# Patient Record
Sex: MALE | Race: WHITE | Employment: UNEMPLOYED | URBAN - NONMETROPOLITAN AREA
[De-identification: names, ages, dates, MRNs, and addresses within clinical notes are randomized per-mention and may not be internally consistent; named-entity substitution may affect disease eponyms.]

---

## 2017-04-20 ENCOUNTER — HOSPITAL ENCOUNTER (OUTPATIENT)
Dept: VASCULAR LAB | Age: 52
Discharge: HOME | End: 2017-04-20

## 2017-04-20 DIAGNOSIS — M79.604 RIGHT LEG PAIN: ICD-10-CM

## 2017-04-20 DIAGNOSIS — M79.89 LEG SWELLING: ICD-10-CM

## 2017-04-20 PROCEDURE — 93971 EXTREMITY STUDY: CPT

## 2020-02-17 ENCOUNTER — OFFICE VISIT (OUTPATIENT)
Dept: PRIMARY CARE CLINIC | Age: 55
End: 2020-02-17
Payer: MEDICAID

## 2020-02-17 VITALS
RESPIRATION RATE: 24 BRPM | BODY MASS INDEX: 44.1 KG/M2 | HEART RATE: 72 BPM | DIASTOLIC BLOOD PRESSURE: 64 MMHG | TEMPERATURE: 98 F | WEIGHT: 315 LBS | HEIGHT: 71 IN | SYSTOLIC BLOOD PRESSURE: 115 MMHG | OXYGEN SATURATION: 78 %

## 2020-02-17 PROCEDURE — 99203 OFFICE O/P NEW LOW 30 MIN: CPT | Performed by: NURSE PRACTITIONER

## 2020-02-17 PROCEDURE — G0296 VISIT TO DETERM LDCT ELIG: HCPCS | Performed by: NURSE PRACTITIONER

## 2020-02-17 PROCEDURE — 96160 PT-FOCUSED HLTH RISK ASSMT: CPT | Performed by: NURSE PRACTITIONER

## 2020-02-17 PROCEDURE — 3023F SPIROM DOC REV: CPT | Performed by: NURSE PRACTITIONER

## 2020-02-17 PROCEDURE — G8427 DOCREV CUR MEDS BY ELIG CLIN: HCPCS | Performed by: NURSE PRACTITIONER

## 2020-02-17 PROCEDURE — G8484 FLU IMMUNIZE NO ADMIN: HCPCS | Performed by: NURSE PRACTITIONER

## 2020-02-17 PROCEDURE — G8431 POS CLIN DEPRES SCRN F/U DOC: HCPCS | Performed by: NURSE PRACTITIONER

## 2020-02-17 PROCEDURE — 3017F COLORECTAL CA SCREEN DOC REV: CPT | Performed by: NURSE PRACTITIONER

## 2020-02-17 PROCEDURE — G8926 SPIRO NO PERF OR DOC: HCPCS | Performed by: NURSE PRACTITIONER

## 2020-02-17 PROCEDURE — 4004F PT TOBACCO SCREEN RCVD TLK: CPT | Performed by: NURSE PRACTITIONER

## 2020-02-17 PROCEDURE — G8417 CALC BMI ABV UP PARAM F/U: HCPCS | Performed by: NURSE PRACTITIONER

## 2020-02-17 RX ORDER — BUDESONIDE AND FORMOTEROL FUMARATE DIHYDRATE 160; 4.5 UG/1; UG/1
2 AEROSOL RESPIRATORY (INHALATION) 2 TIMES DAILY
COMMUNITY
End: 2020-02-17 | Stop reason: SDUPTHER

## 2020-02-17 RX ORDER — CARVEDILOL 6.25 MG/1
6.25 TABLET ORAL 2 TIMES DAILY WITH MEALS
COMMUNITY
End: 2020-02-17 | Stop reason: SDUPTHER

## 2020-02-17 RX ORDER — NICOTINE 21 MG/24HR
1 PATCH, TRANSDERMAL 24 HOURS TRANSDERMAL EVERY 24 HOURS
Qty: 30 PATCH | Refills: 3 | Status: SHIPPED
Start: 2020-02-17 | End: 2020-03-30 | Stop reason: DRUGHIGH

## 2020-02-17 RX ORDER — BUPROPION HYDROCHLORIDE 150 MG/1
150 TABLET ORAL EVERY MORNING
Qty: 30 TABLET | Refills: 3 | Status: SHIPPED | OUTPATIENT
Start: 2020-02-17 | End: 2020-06-17

## 2020-02-17 RX ORDER — TRIAMTERENE AND HYDROCHLOROTHIAZIDE 37.5; 25 MG/1; MG/1
1 TABLET ORAL DAILY
Qty: 90 TABLET | Refills: 1 | Status: SHIPPED | OUTPATIENT
Start: 2020-02-17 | End: 2020-12-28 | Stop reason: SDUPTHER

## 2020-02-17 RX ORDER — ALBUTEROL SULFATE 2.5 MG/3ML
2.5 SOLUTION RESPIRATORY (INHALATION) EVERY 6 HOURS PRN
COMMUNITY
End: 2020-03-23 | Stop reason: SDUPTHER

## 2020-02-17 RX ORDER — FUROSEMIDE 20 MG/1
20 TABLET ORAL DAILY
Qty: 30 TABLET | Refills: 5 | Status: SHIPPED | OUTPATIENT
Start: 2020-02-17 | End: 2020-03-09 | Stop reason: DRUGHIGH

## 2020-02-17 RX ORDER — OXYMETAZOLINE HYDROCHLORIDE 0.05 G/100ML
2 SPRAY NASAL 2 TIMES DAILY
COMMUNITY

## 2020-02-17 RX ORDER — BUDESONIDE AND FORMOTEROL FUMARATE DIHYDRATE 160; 4.5 UG/1; UG/1
2 AEROSOL RESPIRATORY (INHALATION) 2 TIMES DAILY
Qty: 1 INHALER | Refills: 5 | Status: SHIPPED | OUTPATIENT
Start: 2020-02-17 | End: 2020-08-20

## 2020-02-17 RX ORDER — TRIAMTERENE AND HYDROCHLOROTHIAZIDE 37.5; 25 MG/1; MG/1
1 TABLET ORAL DAILY
COMMUNITY
End: 2020-02-17 | Stop reason: SDUPTHER

## 2020-02-17 RX ORDER — CARVEDILOL 6.25 MG/1
6.25 TABLET ORAL 2 TIMES DAILY WITH MEALS
Qty: 180 TABLET | Refills: 1 | Status: SHIPPED | OUTPATIENT
Start: 2020-02-17 | End: 2020-09-28

## 2020-02-17 SDOH — ECONOMIC STABILITY: FOOD INSECURITY: WITHIN THE PAST 12 MONTHS, THE FOOD YOU BOUGHT JUST DIDN'T LAST AND YOU DIDN'T HAVE MONEY TO GET MORE.: NEVER TRUE

## 2020-02-17 SDOH — ECONOMIC STABILITY: INCOME INSECURITY: HOW HARD IS IT FOR YOU TO PAY FOR THE VERY BASICS LIKE FOOD, HOUSING, MEDICAL CARE, AND HEATING?: NOT HARD AT ALL

## 2020-02-17 SDOH — ECONOMIC STABILITY: FOOD INSECURITY: WITHIN THE PAST 12 MONTHS, YOU WORRIED THAT YOUR FOOD WOULD RUN OUT BEFORE YOU GOT MONEY TO BUY MORE.: NEVER TRUE

## 2020-02-17 SDOH — ECONOMIC STABILITY: TRANSPORTATION INSECURITY
IN THE PAST 12 MONTHS, HAS THE LACK OF TRANSPORTATION KEPT YOU FROM MEDICAL APPOINTMENTS OR FROM GETTING MEDICATIONS?: NO

## 2020-02-17 SDOH — ECONOMIC STABILITY: TRANSPORTATION INSECURITY
IN THE PAST 12 MONTHS, HAS LACK OF TRANSPORTATION KEPT YOU FROM MEETINGS, WORK, OR FROM GETTING THINGS NEEDED FOR DAILY LIVING?: NO

## 2020-02-17 SDOH — HEALTH STABILITY: MENTAL HEALTH: HOW OFTEN DO YOU HAVE A DRINK CONTAINING ALCOHOL?: NEVER

## 2020-02-17 ASSESSMENT — ENCOUNTER SYMPTOMS
ABDOMINAL PAIN: 0
NAUSEA: 0
COUGH: 0
VOMITING: 0
WHEEZING: 0
VISUAL CHANGE: 0
CONSTIPATION: 0
DIARRHEA: 0
RHINORRHEA: 0
SORE THROAT: 0
SPUTUM PRODUCTION: 0
DIFFICULTY BREATHING: 0
SHORTNESS OF BREATH: 0
BACK PAIN: 1

## 2020-02-17 ASSESSMENT — PATIENT HEALTH QUESTIONNAIRE - PHQ9
9. THOUGHTS THAT YOU WOULD BE BETTER OFF DEAD, OR OF HURTING YOURSELF: 3
1. LITTLE INTEREST OR PLEASURE IN DOING THINGS: 2
8. MOVING OR SPEAKING SO SLOWLY THAT OTHER PEOPLE COULD HAVE NOTICED. OR THE OPPOSITE, BEING SO FIGETY OR RESTLESS THAT YOU HAVE BEEN MOVING AROUND A LOT MORE THAN USUAL: 0
SUM OF ALL RESPONSES TO PHQ QUESTIONS 1-9: 18
7. TROUBLE CONCENTRATING ON THINGS, SUCH AS READING THE NEWSPAPER OR WATCHING TELEVISION: 0
10. IF YOU CHECKED OFF ANY PROBLEMS, HOW DIFFICULT HAVE THESE PROBLEMS MADE IT FOR YOU TO DO YOUR WORK, TAKE CARE OF THINGS AT HOME, OR GET ALONG WITH OTHER PEOPLE: 0
4. FEELING TIRED OR HAVING LITTLE ENERGY: 3
2. FEELING DOWN, DEPRESSED OR HOPELESS: 3
SUM OF ALL RESPONSES TO PHQ QUESTIONS 1-9: 18
3. TROUBLE FALLING OR STAYING ASLEEP: 3
5. POOR APPETITE OR OVEREATING: 1
6. FEELING BAD ABOUT YOURSELF - OR THAT YOU ARE A FAILURE OR HAVE LET YOURSELF OR YOUR FAMILY DOWN: 3
SUM OF ALL RESPONSES TO PHQ9 QUESTIONS 1 & 2: 5

## 2020-02-17 ASSESSMENT — COLUMBIA-SUICIDE SEVERITY RATING SCALE - C-SSRS
1. WITHIN THE PAST MONTH, HAVE YOU WISHED YOU WERE DEAD OR WISHED YOU COULD GO TO SLEEP AND NOT WAKE UP?: NO
6. HAVE YOU EVER DONE ANYTHING, STARTED TO DO ANYTHING, OR PREPARED TO DO ANYTHING TO END YOUR LIFE?: NO
2. HAVE YOU ACTUALLY HAD ANY THOUGHTS OF KILLING YOURSELF?: NO

## 2020-02-17 ASSESSMENT — COPD QUESTIONNAIRES: COPD: 1

## 2020-02-17 NOTE — PATIENT INSTRUCTIONS
oxygen therapy    · Take your medicines exactly as prescribed. Call your doctor if you think you are having a problem with your medicine.     · You may be taking medicines such as:  ? Bronchodilators. These help open your airways and make breathing easier. Bronchodilators are either short-acting (work for 6 to 9 hours) or long-acting (work for 24 hours). You inhale most bronchodilators, so they start to act quickly. Always carry your quick-relief inhaler with you in case you need it while you are away from home. ? Corticosteroids (prednisone, budesonide). These reduce airway inflammation. They come in pill or inhaled form. You must take these medicines every day for them to work well.     · A spacer may help you get more inhaled medicine to your lungs. Ask your doctor or pharmacist if a spacer is right for you. If it is, ask how to use it properly.     · Do not take any vitamins, over-the-counter medicine, or herbal products without talking to your doctor first.     · If your doctor prescribed antibiotics, take them as directed. Do not stop taking them just because you feel better. You need to take the full course of antibiotics.     · Oxygen therapy boosts the amount of oxygen in your blood and helps you breathe easier. Use the flow rate your doctor has recommended, and do not change it without talking to your doctor first.   Activity    · Get regular exercise. Walking is an easy way to get exercise. Start out slowly, and walk a little more each day.     · Pay attention to your breathing. You are exercising too hard if you cannot talk while you are exercising.     · Take short rest breaks when doing household chores and other activities.     · Learn breathing methods--such as breathing through pursed lips--to help you become less short of breath.     · If your doctor has not set you up with a pulmonary rehabilitation program, talk to him or her about whether rehab is right for you.  Rehab includes exercise always ask your healthcare professional. [place] [address] any warranty or liability for your use of this information. What is lung cancer screening? Lung cancer screening is a way in which doctors check the lungs for early signs of cancer in people who have no symptoms of lung cancer. A low-dose CT scan uses much less radiation than a normal CT scan and shows a more detailed image of the lungs than a standard X-ray. The goal of lung cancer screening is to find cancer early, before it has a chance to grow, spread, or cause problems. One large study found that smokers who were screened with low-dose CT scans were less likely to die of lung cancer than those who were screened with standard X-ray. Below is a summary of the things you need to know regarding screening for lung cancer with low-dose computed tomography (LDCT). This is a screening program that involves routine annual screening with LDCT studies of the lung. The LDCTs are done using low-dose radiation that is not thought to increase your cancer risk. If you have other serious medical conditions (other cancers, congestive heart failure) that limit your life expectancy to less than 10 years, you should not undergo lung cancer screening with LDCT. The chance is 20%-60% that the LDCT result will show abnormalities. This would require additional testing which could include repeat imaging or even invasive procedures. Most (about 95%) of \"abnormal\" LDCT results are false in the sense that no lung cancer is ultimately found. Additionally, some (about 10%) of the cancers found would not affect your life expectancy, even if undetected and untreated. If you are still smoking, the single most important thing that you can do to reduce your risk of dying of lung cancer is to quit. For this screening to be covered by Medicare and most other insurers, strict criteria must be met.   If you do not meet these criteria, but still wish to undergo LDCT testing, you will be required to sign a waiver indicating your willingness to pay for the scan.

## 2020-02-17 NOTE — PROGRESS NOTES
Name: Debbie Lopez  : 1965         Chief Complaint:     Chief Complaint   Patient presents with    Establish Care     New patient. Former patient of [facility].  COPD    Breast Mass     right breast mass \"diagnosed 2 years ago. \"        History of Present Illness:      Debbie Lopez is a 53 y.o.  male who presents with Establish Care (New patient. Former patient of [facility]. ); COPD; and Breast Mass (right breast mass \"diagnosed 2 years ago. \" )      Anny Schumacher is here today to establish care. Breast mass- \"for awhile\", had previous mammogram, was supposed to have biopsy but never did    Callus- both feet, unable to care for his feet, would like podiatry referral    Lower leg edema- chronic, current diuretic slightly helpful    Right groin pain- chronic, states has had previous surgery in the area    Nicotine dependence- would like help with cessation    Hypertension   This is a chronic problem. The current episode started more than 1 year ago. The problem is unchanged. The problem is controlled. Associated symptoms include malaise/fatigue and peripheral edema. Pertinent negatives include no chest pain, headaches, palpitations or shortness of breath. There are no associated agents to hypertension. Risk factors for coronary artery disease include dyslipidemia, family history, obesity, male gender, sedentary lifestyle, smoking/tobacco exposure and stress. Past treatments include diuretics and beta blockers. The current treatment provides moderate improvement. Compliance problems include exercise and diet. There is no history of kidney disease, CAD/MI, heart failure or left ventricular hypertrophy. There is no history of chronic renal disease. COPD   There is no cough, difficulty breathing, shortness of breath, sputum production or wheezing. This is a chronic problem. The current episode started more than 1 year ago. The problem occurs intermittently. The problem has been unchanged. history. He has never used smokeless tobacco.  Alcohol:      reports current alcohol use. Drug Use:  reports previous drug use. Drug: Marijuana. Family History:     Family History   Problem Relation Age of Onset    Diabetes Father        Review of Systems:     Positive and Negative as described in HPI    Review of Systems   Constitutional: Positive for fatigue and malaise/fatigue. Negative for appetite change, chills, fever and unexpected weight change. HENT: Negative for congestion, rhinorrhea and sore throat. Eyes: Negative for visual disturbance. Respiratory: Negative for cough, sputum production, shortness of breath and wheezing. Cardiovascular: Positive for dyspnea on exertion and leg swelling. Negative for chest pain and palpitations. Gastrointestinal: Negative for abdominal pain, constipation, diarrhea, nausea and vomiting. Genitourinary: Negative for difficulty urinating, dysuria and testicular pain. Musculoskeletal: Positive for arthralgias, back pain and myalgias. Skin: Negative for rash. Neurological: Negative for dizziness, seizures, syncope and headaches. Psychiatric/Behavioral: Positive for dysphoric mood and sleep disturbance. Negative for agitation, behavioral problems, confusion, decreased concentration, hallucinations, self-injury and suicidal ideas. The patient has insomnia. The patient is not nervous/anxious and is not hyperactive. Physical Exam:   Vitals:  /64 (Site: Right Upper Arm, Position: Sitting, Cuff Size: Large Adult)   Pulse 72   Temp 98 °F (36.7 °C)   Resp 24   Ht 5' 11\" (1.803 m)   Wt (!) 331 lb 3.2 oz (150.2 kg)   SpO2 (!) 78%   BMI 46.19 kg/m²     Physical Exam  Vitals signs and nursing note reviewed. Constitutional:       General: He is not in acute distress. Appearance: He is obese. He is not ill-appearing.    HENT:      Mouth/Throat:      Mouth: Mucous membranes are moist.   Eyes:      Conjunctiva/sclera: Conjunctivae normal. Neck:      Musculoskeletal: Normal range of motion and neck supple. Cardiovascular:      Rate and Rhythm: Normal rate and regular rhythm. Heart sounds: No murmur. Pulmonary:      Effort: Pulmonary effort is normal.      Breath sounds: Normal breath sounds. No wheezing. Abdominal:      General: Bowel sounds are normal. There is no distension. Palpations: Abdomen is soft. Tenderness: There is abdominal tenderness. Comments: Morbidly obese abdomen   Musculoskeletal:      Right lower leg: Edema (1-2 + pitting) present. Left lower leg: Edema (1-2 + pitting) present. Skin:     General: Skin is warm and dry. Findings: No rash. Neurological:      Mental Status: He is alert and oriented to person, place, and time. Psychiatric:         Attention and Perception: Attention normal.         Mood and Affect: Mood and affect normal.         Speech: Speech normal.         Behavior: Behavior normal. Behavior is cooperative. Thought Content: Thought content normal. Thought content does not include homicidal or suicidal ideation. Thought content does not include homicidal or suicidal plan. Cognition and Memory: Cognition normal.         Judgment: Judgment normal.         Data:     No results found for: NA, K, CL, CO2, BUN, CREATININE, GLUCOSE, PROT, LABALBU, BILITOT, ALKPHOS, AST, ALT  No results found for: WBC, RBC, HGB, HCT, MCV, MCH, MCHC, RDW, PLT, MPV  No results found for: TSH  No results found for: CHOL, HDL, PSA, LABA1C    Assessment/Plan:      Diagnosis Orders   1. Essential hypertension  triamterene-hydrochlorothiazide (MAXZIDE-25) 37.5-25 MG per tablet    carvedilol (COREG) 6.25 MG tablet    Basic Metabolic Panel   2. Chronic obstructive pulmonary disease, unspecified COPD type [facility])  Jordan Chino MD, Pulmonology, [place]    budesonide-formoterol (SYMBICORT) 160-4.5 MCG/ACT AERO   3. Dysthymia  buPROPion (WELLBUTRIN XL) 150 MG extended release tablet   4. (around 5/17/2020) for check up. On the basis of positive PHQ-9 screening (PHQ-9 Total Score: 18), the following plan was implemented: medication prescribed: Wellbutrin- 150 mg- patient will call for any significant medication side effects or worsening symptoms of depression. Patient will follow-up in 1 month(s) with PCP.

## 2020-02-18 ENCOUNTER — TELEPHONE (OUTPATIENT)
Dept: PRIMARY CARE CLINIC | Age: 55
End: 2020-02-18

## 2020-02-19 ENCOUNTER — TELEPHONE (OUTPATIENT)
Dept: ONCOLOGY | Age: 55
End: 2020-02-19

## 2020-02-19 NOTE — LETTER
2/19/2020        [address] [place] [id number] [address] [address]    Dear Amee Parker: Your healthcare provider has ordered a low dose CT scan of the chest for lung cancer screening. You will find enclosed, information about CT lung screening. Please review the statement of understanding, you will be asked to sign a copy of this at the time of your CT scan    If you have not already been contacted to make the appointment for your scan, please call our scheduling department at [phone]    Keep in mind that CT lung screening does not take the place of smoking cessation. If you are a current smoker, you will find enclosed smoking cessation resources. Please do not hesitate to contact me if you have any questions or concerns.     [address] [facility],      [facility] Lung Screening Program  [phone]

## 2020-02-26 ENCOUNTER — TELEPHONE (OUTPATIENT)
Dept: PRIMARY CARE CLINIC | Age: 55
End: 2020-02-26

## 2020-02-26 ENCOUNTER — HOSPITAL ENCOUNTER (OUTPATIENT)
Dept: CT IMAGING | Age: 55
Discharge: HOME | End: 2020-02-28
Payer: MEDICAID

## 2020-02-26 ENCOUNTER — HOSPITAL ENCOUNTER (OUTPATIENT)
Dept: ULTRASOUND IMAGING | Age: 55
Discharge: HOME | End: 2020-02-28
Payer: MEDICAID

## 2020-02-26 ENCOUNTER — HOSPITAL ENCOUNTER (OUTPATIENT)
Age: 55
Discharge: HOME | End: 2020-02-26
Payer: MEDICAID

## 2020-02-26 LAB
ANION GAP SERPL CALCULATED.3IONS-SCNC: 8 MMOL/L (ref 9–17)
BUN BLDV-MCNC: 26 MG/DL (ref 6–20)
BUN/CREAT BLD: 22 (ref 9–20)
CALCIUM SERPL-MCNC: 9 MG/DL (ref 8.6–10.4)
CHLORIDE BLD-SCNC: 97 MMOL/L (ref 98–107)
CO2: 35 MMOL/L (ref 20–31)
CREAT SERPL-MCNC: 1.19 MG/DL (ref 0.7–1.2)
GFR AFRICAN AMERICAN: >60 ML/MIN
GFR NON-AFRICAN AMERICAN: >60 ML/MIN
GFR SERPL CREATININE-BSD FRML MDRD: ABNORMAL ML/MIN/{1.73_M2}
GFR SERPL CREATININE-BSD FRML MDRD: ABNORMAL ML/MIN/{1.73_M2}
GLUCOSE BLD-MCNC: 195 MG/DL (ref 70–99)
POTASSIUM SERPL-SCNC: 4.6 MMOL/L (ref 3.7–5.3)
SODIUM BLD-SCNC: 140 MMOL/L (ref 135–144)

## 2020-02-26 PROCEDURE — 76870 US EXAM SCROTUM: CPT

## 2020-02-26 PROCEDURE — G0297 LDCT FOR LUNG CA SCREEN: HCPCS

## 2020-02-26 PROCEDURE — 80048 BASIC METABOLIC PNL TOTAL CA: CPT

## 2020-02-26 PROCEDURE — 36415 COLL VENOUS BLD VENIPUNCTURE: CPT

## 2020-02-26 NOTE — TELEPHONE ENCOUNTER
Called patient and left message that US of scrotom: Labs stable.  MRI of the abdomen ordered due to abnormal appearance of fluid around the liver. was normal. CT of the lungs show nodule that needs further imaging.  PET scan ordered. Emanuel Bales keep appointment with pulmonologist on March 2. Message left that [facility] will be calling him to schedule MRI and PET scan. To call office with any questions.

## 2020-02-27 ENCOUNTER — HOSPITAL ENCOUNTER (OUTPATIENT)
Dept: WOMENS IMAGING | Age: 55
Discharge: HOME | End: 2020-02-29
Payer: MEDICAID

## 2020-02-27 ENCOUNTER — HOSPITAL ENCOUNTER (OUTPATIENT)
Dept: ULTRASOUND IMAGING | Age: 55
Discharge: HOME | End: 2020-02-29
Payer: MEDICAID

## 2020-02-27 ENCOUNTER — TELEPHONE (OUTPATIENT)
Dept: PRIMARY CARE CLINIC | Age: 55
End: 2020-02-27

## 2020-02-27 PROCEDURE — G0279 TOMOSYNTHESIS, MAMMO: HCPCS

## 2020-02-27 PROCEDURE — 76641 ULTRASOUND BREAST COMPLETE: CPT

## 2020-02-27 NOTE — LETTER
[place] [address] [address] [facility]  Phone: [phone]  Fax: [phone], APRN - CNP        February 27, 2020     Patient: Veronica Curiel   YOB: 1965   Date of Visit: 2/27/2020       To Whom It May Concern: It is my medical opinion that Ashu Man requires a disability parking placard for the following reasons:  He cannot walk 200 feet without stopping to rest.  Duration of need: 1 year    If you have any questions or concerns, please don't hesitate to call.     Sincerely,        Lacey Segundo, APRN - CNP

## 2020-02-28 ENCOUNTER — TELEPHONE (OUTPATIENT)
Dept: PRIMARY CARE CLINIC | Age: 55
End: 2020-02-28

## 2020-02-28 NOTE — TELEPHONE ENCOUNTER
Attempted to call patient and message left regarding normal mammogram. Instructed to call this office with any questions.

## 2020-02-28 NOTE — TELEPHONE ENCOUNTER
----- Message from [address] [place] [address], ADRIAN - CNP sent at 2/27/2020  8:11 PM EST -----  Results are normal, please call patient and make them aware.

## 2020-03-03 ENCOUNTER — TELEPHONE (OUTPATIENT)
Dept: PRIMARY CARE CLINIC | Age: 55
End: 2020-03-03

## 2020-03-06 ENCOUNTER — TELEPHONE (OUTPATIENT)
Dept: PRIMARY CARE CLINIC | Age: 55
End: 2020-03-06

## 2020-03-06 ENCOUNTER — HOSPITAL ENCOUNTER (EMERGENCY)
Age: 55
Discharge: HOME | End: 2020-03-06
Payer: MEDICAID

## 2020-03-06 ENCOUNTER — APPOINTMENT (OUTPATIENT)
Dept: VASCULAR LAB | Age: 55
End: 2020-03-06
Payer: MEDICAID

## 2020-03-06 VITALS
BODY MASS INDEX: 44.1 KG/M2 | RESPIRATION RATE: 22 BRPM | DIASTOLIC BLOOD PRESSURE: 71 MMHG | TEMPERATURE: 98.2 F | HEIGHT: 71 IN | OXYGEN SATURATION: 93 % | HEART RATE: 76 BPM | SYSTOLIC BLOOD PRESSURE: 135 MMHG | WEIGHT: 315 LBS

## 2020-03-06 LAB
ANION GAP SERPL CALCULATED.3IONS-SCNC: 5 MMOL/L (ref 9–17)
BUN BLDV-MCNC: 15 MG/DL (ref 6–20)
BUN/CREAT BLD: 17 (ref 9–20)
CALCIUM SERPL-MCNC: 9 MG/DL (ref 8.6–10.4)
CHLORIDE BLD-SCNC: 93 MMOL/L (ref 98–107)
CO2: 40 MMOL/L (ref 20–31)
CREAT SERPL-MCNC: 0.86 MG/DL (ref 0.7–1.2)
GFR AFRICAN AMERICAN: >60 ML/MIN
GFR NON-AFRICAN AMERICAN: >60 ML/MIN
GFR SERPL CREATININE-BSD FRML MDRD: ABNORMAL ML/MIN/{1.73_M2}
GFR SERPL CREATININE-BSD FRML MDRD: ABNORMAL ML/MIN/{1.73_M2}
GLUCOSE BLD-MCNC: 131 MG/DL (ref 70–99)
HCT VFR BLD CALC: 56.9 % (ref 40.7–50.3)
HEMOGLOBIN: 16.6 G/DL (ref 13–17)
MCH RBC QN AUTO: 29.5 PG (ref 25.2–33.5)
MCHC RBC AUTO-ENTMCNC: 29.2 G/DL (ref 28.4–34.8)
MCV RBC AUTO: 101.2 FL (ref 82.6–102.9)
NRBC AUTOMATED: 0 PER 100 WBC
PDW BLD-RTO: 18.7 % (ref 11.8–14.4)
PLATELET # BLD: 142 K/UL (ref 138–453)
PMV BLD AUTO: 11.1 FL (ref 8.1–13.5)
POTASSIUM SERPL-SCNC: 5 MMOL/L (ref 3.7–5.3)
RBC # BLD: 5.62 M/UL (ref 4.21–5.77)
SODIUM BLD-SCNC: 138 MMOL/L (ref 135–144)
WBC # BLD: 7.8 K/UL (ref 3.5–11.3)

## 2020-03-06 PROCEDURE — 99283 EMERGENCY DEPT VISIT LOW MDM: CPT

## 2020-03-06 PROCEDURE — 85027 COMPLETE CBC AUTOMATED: CPT

## 2020-03-06 PROCEDURE — 93971 EXTREMITY STUDY: CPT

## 2020-03-06 PROCEDURE — 36415 COLL VENOUS BLD VENIPUNCTURE: CPT

## 2020-03-06 PROCEDURE — 80048 BASIC METABOLIC PNL TOTAL CA: CPT

## 2020-03-06 RX ORDER — CEPHALEXIN 500 MG/1
500 CAPSULE ORAL 4 TIMES DAILY
Qty: 40 CAPSULE | Refills: 0 | Status: SHIPPED | OUTPATIENT
Start: 2020-03-06 | End: 2020-03-16

## 2020-03-06 RX ORDER — SULFAMETHOXAZOLE AND TRIMETHOPRIM 800; 160 MG/1; MG/1
1 TABLET ORAL 2 TIMES DAILY
Qty: 20 TABLET | Refills: 0 | Status: SHIPPED | OUTPATIENT
Start: 2020-03-06 | End: 2020-03-16

## 2020-03-06 ASSESSMENT — ENCOUNTER SYMPTOMS
NAUSEA: 0
SHORTNESS OF BREATH: 0
VOMITING: 0
BLOOD IN STOOL: 0
RHINORRHEA: 0
ABDOMINAL PAIN: 0
BACK PAIN: 0
WHEEZING: 0
COUGH: 0
EYE DISCHARGE: 0
CHEST TIGHTNESS: 0
CONSTIPATION: 0
DIARRHEA: 0
SORE THROAT: 0
EYE REDNESS: 0

## 2020-03-06 ASSESSMENT — PAIN - FUNCTIONAL ASSESSMENT: PAIN_FUNCTIONAL_ASSESSMENT: 0-10

## 2020-03-06 ASSESSMENT — PAIN SCALES - GENERAL: PAINLEVEL_OUTOF10: 4

## 2020-03-06 NOTE — TELEPHONE ENCOUNTER
Pt called office stating that his legs are more swollen and even have red and purple areas on his legs. Pt states his left leg is the worst and he has been taking the medications that were prescribed. Writer advised pt to go to the ER with the symptoms he states he has. Pt verbalized understanding.

## 2020-03-06 NOTE — ED PROVIDER NOTES
myalgias. Skin: Positive for wound. Negative for pallor and rash. Allergic/Immunologic: Negative for food allergies and immunocompromised state. Neurological: Negative for dizziness, syncope, weakness and light-headedness. Hematological: Negative for adenopathy. Does not bruise/bleed easily. Psychiatric/Behavioral: Negative for behavioral problems and suicidal ideas. The patient is not nervous/anxious. Except as noted above the remainder of the review of systems was reviewed and negative.        PAST MEDICAL HISTORY     Past Medical History:   Diagnosis Date    COPD [facility]chronic obstructive pulmonary disease) ([facility] [address]     Hypertension          SURGICALHISTORY       Past Surgical History:   Procedure Laterality Date    ANKLE SURGERY      CARPAL TUNNEL RELEASE Bilateral     HERNIA REPAIR      KNEE ARTHROSCOPY      NECK SURGERY           CURRENT MEDICATIONS       Previous Medications    ALBUTEROL (PROVENTIL) (2.5 MG/3ML) 0.083% NEBULIZER SOLUTION    Take 2.5 mg by nebulization every 6 hours as needed for Wheezing    BUDESONIDE-FORMOTEROL (SYMBICORT) 160-4.5 MCG/ACT AERO    Inhale 2 puffs into the lungs 2 times daily    BUPROPION (WELLBUTRIN XL) 150 MG EXTENDED RELEASE TABLET    Take 1 tablet by mouth every morning    CARVEDILOL (COREG) 6.25 MG TABLET    Take 1 tablet by mouth 2 times daily (with meals)    COLCHICINE (COLCRYS PO)    Take 1 tablet by mouth 2 times daily     FUROSEMIDE (LASIX) 20 MG TABLET    Take 1 tablet by mouth daily    NICOTINE (NICODERM CQ) 21 MG/24HR    Place 1 patch onto the skin every 24 hours    OXYMETAZOLINE (AFRIN) 0.05 % NASAL SPRAY    2 sprays by Nasal route 2 times daily    TRIAMTERENE-HYDROCHLOROTHIAZIDE (MAXZIDE-25) 37.5-25 MG PER TABLET    Take 1 tablet by mouth daily       ALLERGIES     Pcn [penicillins]    FAMILY HISTORY       Family History   Problem Relation Age of Onset    Diabetes Father           SOCIAL HISTORY       Social History     Socioeconomic History    normal.      Mouth/Throat:      Mouth: Mucous membranes are moist.      Pharynx: No oropharyngeal exudate. Eyes:      General: No scleral icterus. Right eye: No discharge. Left eye: No discharge. Conjunctiva/sclera: Conjunctivae normal.      Pupils: Pupils are equal, round, and reactive to light. Neck:      Musculoskeletal: Normal range of motion and neck supple. Thyroid: No thyromegaly. Trachea: No tracheal deviation. Cardiovascular:      Rate and Rhythm: Normal rate and regular rhythm. Pulmonary:      Effort: Pulmonary effort is normal. No respiratory distress. Breath sounds: Normal breath sounds. No stridor. No wheezing. Comments: On nasal cannula  Abdominal:      General: Bowel sounds are normal. There is no distension. Palpations: Abdomen is soft. Tenderness: There is no abdominal tenderness. There is no guarding or rebound. Musculoskeletal: Normal range of motion. General: No tenderness, deformity or signs of injury. Lymphadenopathy:      Cervical: No cervical adenopathy. Skin:     General: Skin is warm and dry. Capillary Refill: Capillary refill takes less than 2 seconds. Findings: Erythema present. No rash. Comments: There is swelling left greater than right of the lower leg. Erythematous with warmth noted. Small open scratch. Intact distal pulses sensation cap refills less than 3 seconds. There is some slight calf tenderness. Otherwise full range of motion of knee foot and ankle. No cyanosis no skin necrosis. Neurological:      General: No focal deficit present. Mental Status: He is alert and oriented to person, place, and time. Cranial Nerves: No cranial nerve deficit. Motor: No abnormal muscle tone. Deep Tendon Reflexes: Reflexes are normal and symmetric.  Reflexes normal.   Psychiatric:         Behavior: Behavior normal.         DIAGNOSTIC RESULTS     EKG: All EKG's are interpreted by the

## 2020-03-09 ENCOUNTER — TELEPHONE (OUTPATIENT)
Dept: PRIMARY CARE CLINIC | Age: 55
End: 2020-03-09

## 2020-03-09 ENCOUNTER — OFFICE VISIT (OUTPATIENT)
Dept: PRIMARY CARE CLINIC | Age: 55
End: 2020-03-09
Payer: MEDICAID

## 2020-03-09 ENCOUNTER — HOSPITAL ENCOUNTER (OUTPATIENT)
Dept: MRI IMAGING | Age: 55
Discharge: HOME | End: 2020-03-11
Payer: MEDICAID

## 2020-03-09 VITALS
HEIGHT: 71 IN | DIASTOLIC BLOOD PRESSURE: 73 MMHG | HEART RATE: 76 BPM | SYSTOLIC BLOOD PRESSURE: 128 MMHG | TEMPERATURE: 97.8 F | RESPIRATION RATE: 18 BRPM | BODY MASS INDEX: 43.12 KG/M2 | WEIGHT: 308 LBS

## 2020-03-09 PROCEDURE — G8417 CALC BMI ABV UP PARAM F/U: HCPCS | Performed by: NURSE PRACTITIONER

## 2020-03-09 PROCEDURE — G8427 DOCREV CUR MEDS BY ELIG CLIN: HCPCS | Performed by: NURSE PRACTITIONER

## 2020-03-09 PROCEDURE — 74181 MRI ABDOMEN W/O CONTRAST: CPT

## 2020-03-09 PROCEDURE — G8484 FLU IMMUNIZE NO ADMIN: HCPCS | Performed by: NURSE PRACTITIONER

## 2020-03-09 PROCEDURE — 3017F COLORECTAL CA SCREEN DOC REV: CPT | Performed by: NURSE PRACTITIONER

## 2020-03-09 PROCEDURE — 99214 OFFICE O/P EST MOD 30 MIN: CPT | Performed by: NURSE PRACTITIONER

## 2020-03-09 PROCEDURE — 4004F PT TOBACCO SCREEN RCVD TLK: CPT | Performed by: NURSE PRACTITIONER

## 2020-03-09 RX ORDER — AMMONIUM LACTATE 12 G/100G
CREAM TOPICAL
COMMUNITY
Start: 2020-02-17 | End: 2022-01-27 | Stop reason: ALTCHOICE

## 2020-03-09 RX ORDER — FUROSEMIDE 20 MG/1
40 TABLET ORAL DAILY
Qty: 30 TABLET | Refills: 5 | Status: SHIPPED
Start: 2020-02-17 | End: 2020-03-30 | Stop reason: SDUPTHER

## 2020-03-09 ASSESSMENT — ENCOUNTER SYMPTOMS
SHORTNESS OF BREATH: 0
SORE THROAT: 0
VOMITING: 0
DIARRHEA: 0
RHINORRHEA: 0
NAUSEA: 0
COUGH: 0
ABDOMINAL PAIN: 0
WHEEZING: 0
COLOR CHANGE: 1
CONSTIPATION: 0

## 2020-03-09 NOTE — PATIENT INSTRUCTIONS
wound with a thin layer of petroleum jelly, such as Vaseline, and a nonstick bandage. ? Apply more petroleum jelly and replace the bandage as needed. · Be safe with medicines. Take pain medicines exactly as directed. ? If the doctor gave you a prescription medicine for pain, take it as prescribed. ? If you are not taking a prescription pain medicine, ask your doctor if you can take an over-the-counter medicine. To prevent cellulitis in the future  · Try to prevent cuts, scrapes, or other injuries to your skin. Cellulitis most often occurs where there is a break in the skin. · If you get a scrape, cut, mild burn, or bite, wash the wound with clean water as soon as you can to help avoid infection. Don't use hydrogen peroxide or alcohol, which can slow healing. · If you have swelling in your legs (edema), support stockings and good skin care may help prevent leg sores and cellulitis. · Take care of your feet, especially if you have diabetes or other conditions that increase the risk of infection. Wear shoes and socks. Do not go barefoot. If you have athlete's foot or other skin problems on your feet, talk to your doctor about how to treat them. When should you call for help? Call your doctor now or seek immediate medical care if:    · You have signs that your infection is getting worse, such as:  ? Increased pain, swelling, warmth, or redness. ? Red streaks leading from the area. ? Pus draining from the area. ? A fever.     · You get a rash.    Watch closely for changes in your health, and be sure to contact your doctor if:    · You do not get better as expected. Where can you learn more? Go to https://[email or web address].[email or web address].net. org and sign in to your [email or web address] account. Enter U148 in the MedicAnimal.com box to learn more about \"Cellulitis: Care Instructions. \"     If you do not have an account, please click on the \"Sign Up Now\" link.   Current as of: April 1, 2019  Content Version: 12.3  ©

## 2020-03-09 NOTE — PROGRESS NOTES
Name: Ebony Rivas  : 1965         Chief Complaint:     Chief Complaint   Patient presents with    ED Follow-up     States \"I was in the ER on Friday for cellulitis. \"  Left lower leg.  Cellulitis       History of Present Illness:      Ebony Rivas is a 53 y.o.  male who presents with ED Follow-up (States \"I was in the ER on Friday for cellulitis. \"  Left lower leg. ) and Cellulitis      Oletha Hetal is here today for an ER follow up visit. Lower leg cellulitis- in ER for redness and swelling, ruled out DVT, treated with ATB, improving    Lower leg edema- continues despite 20 mg furosemide daily        Wound Check   He was originally treated 5 to 10 days ago. Previous treatment included IV/IM antibiotics and oral antibiotics. His temperature was unmeasured prior to arrival. There has been no drainage from the wound. The redness has improved. The swelling has improved. The pain has improved. He has no difficulty moving the affected extremity or digit. Past Medical History:     Past Medical History:   Diagnosis Date    COPD (chronic obstructive pulmonary disease) ([facility] [address]     Hypertension       Reviewed all health maintenance requirements and ordered appropriate tests  Health Maintenance Due   Topic Date Due    Lipid screen  2004    Diabetes screen  2004    Colon cancer screen colonoscopy  2014       Past Surgical History:     Past Surgical History:   Procedure Laterality Date    ANKLE SURGERY      CARPAL TUNNEL RELEASE Bilateral     HERNIA REPAIR      KNEE ARTHROSCOPY      NECK SURGERY          Medications:       Prior to Admission medications    Medication Sig Start Date End Date Taking?  Authorizing Provider   ammonium lactate (AMLACTIN) 12 % cream  20  Yes Historical Provider, MD   furosemide (LASIX) 20 MG tablet Take 2 tablets by mouth daily 20  Yes Aby Lopez Might, APRN - CNP   sulfamethoxazole-trimethoprim (BACTRIM DS) 800-160 MG per tablet Take 1 tablet by mouth 2 times daily for 10 days 3/6/20 3/16/20 Yes Andrea Dillard PA-C   cephALEXin (KEFLEX) 500 MG capsule Take 1 capsule by mouth 4 times daily for 10 days 3/6/20 3/16/20 Yes Andrea Dillard PA-C   albuterol (PROVENTIL) (2.5 MG/3ML) 0.083% nebulizer solution Take 2.5 mg by nebulization every 6 hours as needed for Wheezing   Yes Historical Provider, MD   Colchicine (COLCRYS PO) Take 1 tablet by mouth 2 times daily    Yes Historical Provider, MD   oxymetazoline (AFRIN) 0.05 % nasal spray 2 sprays by Nasal route 2 times daily   Yes Historical Provider, MD   budesonide-formoterol (SYMBICORT) 160-4.5 MCG/ACT AERO Inhale 2 puffs into the lungs 2 times daily 2/17/20  Yes ADRIAN Pacheco CNP   triamterene-hydrochlorothiazide (MAXZIDE-25) 37.5-25 MG per tablet Take 1 tablet by mouth daily 2/17/20  Yes ADRIAN Pacheco CNP   carvedilol (COREG) 6.25 MG tablet Take 1 tablet by mouth 2 times daily (with meals) 2/17/20  Yes ADRIAN Pacheco CNP   nicotine (NICODERM CQ) 21 MG/24HR Place 1 patch onto the skin every 24 hours 2/17/20  Yes ADRIAN Pacheco CNP   buPROPion (WELLBUTRIN XL) 150 MG extended release tablet Take 1 tablet by mouth every morning 2/17/20  Yes ADRIAN Pacheco CNP        Allergies:       Pcn [penicillins]    Social History:     Tobacco:    reports that he has been smoking. He has a 4.20 pack-year smoking history. He has never used smokeless tobacco.  Alcohol:      reports current alcohol use. Drug Use:  reports previous drug use. Drug: Marijuana. Family History:     Family History   Problem Relation Age of Onset    Diabetes Father        Review of Systems:     Positive and Negative as described in HPI    Review of Systems   Constitutional: Negative for chills, fatigue and fever. HENT: Negative for congestion, rhinorrhea and sore throat. Eyes: Negative for visual disturbance. Respiratory: Negative for cough, shortness of breath and wheezing.     Cardiovascular: CREATININE 0.86 03/06/2020    GLUCOSE 131 03/06/2020     Lab Results   Component Value Date    WBC 7.8 03/06/2020    RBC 5.62 03/06/2020    HGB 16.6 03/06/2020    HCT 56.9 03/06/2020    .2 03/06/2020    MCH 29.5 03/06/2020    MCHC 29.2 03/06/2020    RDW 18.7 03/06/2020     03/06/2020    MPV 11.1 03/06/2020     No results found for: TSH  No results found for: CHOL, HDL, PSA, LABA1C    Assessment/Plan:      Diagnosis Orders   1. Cellulitis of left lower leg  Basic Metabolic Panel   2. Lower leg edema  furosemide (LASIX) 20 MG tablet    Basic Metabolic Panel       Continue ATB  Increase furosemide to 40 mg daily. Repeat BMP in one week. 1.  Sara Godinez received counseling on the following healthy behaviors: nutrition, exercise and medication adherence  2. Patient given educational materials - see patient instructions  3. Was a self-tracking handout given in paper form or via Transfer[employer]iset? No  If yes, see orders or list here. 4.  Discussed use, benefit, and side effects of prescribed medications. Barriers to medication compliance addressed. All patient questions answered. Pt voiced understanding. 5.  Reviewed prior labs and health maintenance  6. Continue current medications, diet and exercise. Completed Refills   Requested Prescriptions     Signed Prescriptions Disp Refills    furosemide (LASIX) 20 MG tablet 30 tablet 5     Sig: Take 2 tablets by mouth daily         Return if symptoms worsen or fail to improve.

## 2020-03-09 NOTE — TELEPHONE ENCOUNTER
[facility] Transitions Initial Follow Up Call    Outreach made within 2 business days of discharge: Yes    Patient: Thor Horton Patient : 1965   MRN: [id number]  Reason for Admission: There are no discharge diagnoses documented for the most recent discharge. Discharge Date: 3/6/20       Spoke with: Saeid Pritchett    Discharge department/facility: [facility] ER    TCM Interactive Patient Contact:  Was patient able to fill all prescriptions: yes  Was patient instructed to bring all medications to the follow-up visit: Yes  Is patient taking all medications as directed in the discharge summary?  Yes  Does patient understand their discharge instructions: Yes  Does patient have questions or concerns that need addressed prior to 7-14 day follow up office visit: no, had appt this am    Scheduled appointment with PCP within 7-14 days    Follow Up  Future Appointments   Date Time Provider Vinayak Cornell   3/9/2020 12:30 PM [facility] MRI [facility] Rad   2020 10:40 AM Popeye Segundo, APRN - CNP [facility]       Riana Delong

## 2020-03-10 ENCOUNTER — TELEPHONE (OUTPATIENT)
Dept: PRIMARY CARE CLINIC | Age: 55
End: 2020-03-10

## 2020-03-10 NOTE — TELEPHONE ENCOUNTER
Called patient and left message that Nikia Wing reviewed his MRI results and that it shows cirrhosis of liver and that Nikia Wing is referring him to Dr Dnai Stephenson the GI specialist and that he wants additional blood work done to check for hepatitis. Message left that Dr Tillman Rolling office will call him to schedule appt. To call office with any questions.

## 2020-03-10 NOTE — TELEPHONE ENCOUNTER
----- Message from ADRIAN Garvin CNP sent at 3/10/2020 12:30 PM EDT -----  MRI does show liver cirrhosis. Additional labs for hepatitis ordered and consult with GI ordered.   Thank you

## 2020-03-11 ENCOUNTER — TELEPHONE (OUTPATIENT)
Dept: CASE MANAGEMENT | Age: 55
End: 2020-03-11

## 2020-03-11 NOTE — TELEPHONE ENCOUNTER
Lung Navigator reviewing chart and recent Lung Screening and pt. Needing PET, order placed by provider, plan to follow. Pt. cancelled appt. On 3/2 with Dr. Elver Aranda.

## 2020-03-12 ENCOUNTER — TELEPHONE (OUTPATIENT)
Dept: PRIMARY CARE CLINIC | Age: 55
End: 2020-03-12

## 2020-03-12 NOTE — TELEPHONE ENCOUNTER
Attempted to call patient regarding PET CT. Message left that PET Scan was scheduled for 3/18/2020 at 7:30 AM. Informed that someone from the PET scan office will call him to go over some info. Instructed to call this office with any questions.

## 2020-03-13 ENCOUNTER — TELEPHONE (OUTPATIENT)
Dept: PRIMARY CARE CLINIC | Age: 55
End: 2020-03-13

## 2020-03-13 LAB
CONTROL: PRESENT
HEMOCCULT STL QL: NEGATIVE

## 2020-03-13 PROCEDURE — 82274 ASSAY TEST FOR BLOOD FECAL: CPT | Performed by: NURSE PRACTITIONER

## 2020-03-13 NOTE — TELEPHONE ENCOUNTER
Attempted to call patient and message left regarding normal FIT test. Instructed to call this office with any questions.

## 2020-03-18 ENCOUNTER — HOSPITAL ENCOUNTER (OUTPATIENT)
Dept: PET IMAGING | Age: 55
Discharge: HOME | End: 2020-03-20
Payer: MEDICAID

## 2020-03-18 ENCOUNTER — TELEPHONE (OUTPATIENT)
Dept: PRIMARY CARE CLINIC | Age: 55
End: 2020-03-18

## 2020-03-18 PROCEDURE — A9552 F18 FDG: HCPCS | Performed by: NURSE PRACTITIONER

## 2020-03-18 PROCEDURE — 3430000000 HC RX DIAGNOSTIC RADIOPHARMACEUTICAL: Performed by: NURSE PRACTITIONER

## 2020-03-18 PROCEDURE — 78815 PET IMAGE W/CT SKULL-THIGH: CPT

## 2020-03-18 RX ORDER — FLUDEOXYGLUCOSE F 18 200 MCI/ML
11.9 INJECTION, SOLUTION INTRAVENOUS
Status: COMPLETED | OUTPATIENT
Start: 2020-03-18 | End: 2020-03-18

## 2020-03-18 RX ADMIN — FLUDEOXYGLUCOSE F 18 11.9 MILLICURIE: 200 INJECTION, SOLUTION INTRAVENOUS at 07:50

## 2020-03-18 NOTE — TELEPHONE ENCOUNTER
----- Message from [address] [place] [address], ADRIAN - CNP sent at 3/18/2020 11:45 AM EDT -----  No abnormal uptake on PET scan, he will however need follow-up with pulmonology. I see that he canceled his appointment, we will need to reschedule place.

## 2020-03-19 ENCOUNTER — TELEPHONE (OUTPATIENT)
Dept: CASE MANAGEMENT | Age: 55
End: 2020-03-19

## 2020-03-23 ENCOUNTER — HOSPITAL ENCOUNTER (OUTPATIENT)
Age: 55
Discharge: HOME | End: 2020-03-23
Payer: MEDICAID

## 2020-03-23 LAB
ANION GAP SERPL CALCULATED.3IONS-SCNC: 11 MMOL/L (ref 9–17)
BUN BLDV-MCNC: 25 MG/DL (ref 6–20)
BUN/CREAT BLD: 27 (ref 9–20)
CALCIUM SERPL-MCNC: 10.4 MG/DL (ref 8.6–10.4)
CHLORIDE BLD-SCNC: 94 MMOL/L (ref 98–107)
CO2: 29 MMOL/L (ref 20–31)
CREAT SERPL-MCNC: 0.93 MG/DL (ref 0.7–1.2)
GFR AFRICAN AMERICAN: >60 ML/MIN
GFR NON-AFRICAN AMERICAN: >60 ML/MIN
GFR SERPL CREATININE-BSD FRML MDRD: ABNORMAL ML/MIN/{1.73_M2}
GFR SERPL CREATININE-BSD FRML MDRD: ABNORMAL ML/MIN/{1.73_M2}
GLUCOSE BLD-MCNC: 90 MG/DL (ref 70–99)
HAV IGM SER IA-ACNC: NONREACTIVE
HEPATITIS B CORE IGM ANTIBODY: NONREACTIVE
HEPATITIS B SURFACE ANTIGEN: NONREACTIVE
HEPATITIS C ANTIBODY: NONREACTIVE
POTASSIUM SERPL-SCNC: 4.9 MMOL/L (ref 3.7–5.3)
SODIUM BLD-SCNC: 134 MMOL/L (ref 135–144)

## 2020-03-23 PROCEDURE — 80048 BASIC METABOLIC PNL TOTAL CA: CPT

## 2020-03-23 PROCEDURE — 36415 COLL VENOUS BLD VENIPUNCTURE: CPT

## 2020-03-23 PROCEDURE — 80074 ACUTE HEPATITIS PANEL: CPT

## 2020-03-23 RX ORDER — ALBUTEROL SULFATE 2.5 MG/3ML
2.5 SOLUTION RESPIRATORY (INHALATION) EVERY 6 HOURS PRN
Qty: 120 EACH | Refills: 1 | Status: SHIPPED | OUTPATIENT
Start: 2020-03-23 | End: 2020-05-26

## 2020-03-23 RX ORDER — ZOLPIDEM TARTRATE 10 MG/1
10 TABLET ORAL NIGHTLY PRN
Qty: 14 TABLET | Refills: 0 | Status: SHIPPED | OUTPATIENT
Start: 2020-03-23 | End: 2020-04-06

## 2020-03-23 NOTE — TELEPHONE ENCOUNTER
Phone call from patient requesting refill of albuterol nebulizer medication. Also patient states that his previous doctor ordered Ambien 10mg for him for sleep. States was given 10 tablets in October and just ran out. Would like refill. Health Maintenance   Topic Date Due    Lipid screen  04/24/2004    Diabetes screen  04/24/2004    DTaP/Tdap/Td vaccine (1 - Tdap) 02/17/2021 (Originally 4/24/1983)    Flu vaccine (1) 02/17/2021 (Originally 9/1/2019)    Shingles Vaccine (1 of 2) 02/17/2021 (Originally 4/24/2014)    Hepatitis C screen  02/17/2021 (Originally 1965)    HIV screen  02/17/2021 (Originally 4/24/1979)    Hepatitis A vaccine (1 of 2 - Risk 2-dose series) 03/09/2021 (Originally 4/24/1965)    Hepatitis B vaccine (1 of 3 - Risk 3-dose series) 03/09/2021 (Originally 4/24/1983)    Pneumococcal 0-64 years Vaccine (1 of 1 - PPSV23) 03/09/2021 (Originally 4/24/1970)    Potassium monitoring  03/06/2021    Creatinine monitoring  03/06/2021    Colon Cancer Screen FIT/FOBT  03/13/2021    Hib vaccine  Aged Out    Meningococcal (ACWY) vaccine  Aged Out             (applicable per patient's age: Cancer Screenings, Depression Screening, Fall Risk Screening, Immunizations)    BUN (mg/dL)   Date Value   03/06/2020 15      (goal A1C is < 7)   (goal LDL is <100) need 30-50% reduction from baseline     BP Readings from Last 3 Encounters:   03/09/20 128/73   03/06/20 135/71   02/17/20 115/64    (goal /80)      All Future Testing planned in [facility]:  Lab Frequency Next Occurrence   Basic Metabolic Panel Once 55/87/6529   Hepatitis Panel, Acute Once 03/25/2020       Next Visit Date:  Future Appointments   Date Time Provider Vinayak Cornell   5/19/2020 10:40 AM Popeye Segundo APRN - CNP [facility]   6/16/2020  1:45 PM Anita Rolle MD [facility]            There is no problem list on file for this patient.

## 2020-03-24 ENCOUNTER — TELEPHONE (OUTPATIENT)
Dept: PRIMARY CARE CLINIC | Age: 55
End: 2020-03-24

## 2020-03-30 ENCOUNTER — TELEPHONE (OUTPATIENT)
Dept: CASE MANAGEMENT | Age: 55
End: 2020-03-30

## 2020-03-30 RX ORDER — FUROSEMIDE 40 MG/1
40 TABLET ORAL DAILY
Qty: 90 TABLET | Refills: 3 | Status: SHIPPED | OUTPATIENT
Start: 2020-03-30 | End: 2021-03-19

## 2020-03-30 RX ORDER — NICOTINE 21 MG/24HR
1 PATCH, TRANSDERMAL 24 HOURS TRANSDERMAL EVERY 24 HOURS
Qty: 30 PATCH | Refills: 3 | Status: SHIPPED | OUTPATIENT
Start: 2020-03-30 | End: 2022-01-27 | Stop reason: ALTCHOICE

## 2020-03-30 NOTE — TELEPHONE ENCOUNTER
Pharmacy needs updated prescription with new dose of furosemide. Health Maintenance   Topic Date Due    Lipid screen  04/24/2004    DTaP/Tdap/Td vaccine (1 - Tdap) 02/17/2021 (Originally 4/24/1983)    Flu vaccine (1) 02/17/2021 (Originally 9/1/2019)    Shingles Vaccine (1 of 2) 02/17/2021 (Originally 4/24/2014)    HIV screen  02/17/2021 (Originally 4/24/1979)    Hepatitis A vaccine (1 of 2 - Risk 2-dose series) 03/09/2021 (Originally 4/24/1965)    Hepatitis B vaccine (1 of 3 - Risk 3-dose series) 03/09/2021 (Originally 4/24/1983)    Pneumococcal 0-64 years Vaccine (1 of 1 - PPSV23) 03/09/2021 (Originally 4/24/1970)    Colon Cancer Screen FIT/FOBT  03/13/2021    Potassium monitoring  03/23/2021    Creatinine monitoring  03/23/2021    Hepatitis C screen  Completed    Hib vaccine  Aged Out    Meningococcal (ACWY) vaccine  Aged Out             (applicable per patient's age: Cancer Screenings, Depression Screening, Fall Risk Screening, Immunizations)    BUN (mg/dL)   Date Value   03/23/2020 25 (H)      (goal A1C is < 7)   (goal LDL is <100) need 30-50% reduction from baseline     BP Readings from Last 3 Encounters:   03/09/20 128/73   03/06/20 135/71   02/17/20 115/64    (goal /80)      All Future Testing planned in [facility]:      Next Visit Date:  Future Appointments   Date Time Provider Vinayak Cornell   5/19/2020 10:40 AM ADRIAN Ahmadi - CNP [facility]   6/16/2020  1:45 PM Uzma Chance MD [facility]            There is no problem list on file for this patient.

## 2020-03-30 NOTE — TELEPHONE ENCOUNTER
[facility] sent a request for a new rx for the next strength of Nicotine patch      Health Maintenance   Topic Date Due    Lipid screen  04/24/2004    DTaP/Tdap/Td vaccine (1 - Tdap) 02/17/2021 (Originally 4/24/1983)    Flu vaccine (1) 02/17/2021 (Originally 9/1/2019)    Shingles Vaccine (1 of 2) 02/17/2021 (Originally 4/24/2014)    HIV screen  02/17/2021 (Originally 4/24/1979)    Hepatitis A vaccine (1 of 2 - Risk 2-dose series) 03/09/2021 (Originally 4/24/1965)    Hepatitis B vaccine (1 of 3 - Risk 3-dose series) 03/09/2021 (Originally 4/24/1983)    Pneumococcal 0-64 years Vaccine (1 of 1 - PPSV23) 03/09/2021 (Originally 4/24/1970)    Colon Cancer Screen FIT/FOBT  03/13/2021    Potassium monitoring  03/23/2021    Creatinine monitoring  03/23/2021    Hepatitis C screen  Completed    Hib vaccine  Aged Out    Meningococcal (ACWY) vaccine  Aged Out             (applicable per patient's age: Cancer Screenings, Depression Screening, Fall Risk Screening, Immunizations)    BUN (mg/dL)   Date Value   03/23/2020 25 (H)      (goal A1C is < 7)   (goal LDL is <100) need 30-50% reduction from baseline     BP Readings from Last 3 Encounters:   03/09/20 128/73   03/06/20 135/71   02/17/20 115/64    (goal /80)      All Future Testing planned in [facility]:      Next Visit Date:  Future Appointments   Date Time Provider Vinayak Cornell   5/19/2020 10:40 AM Shaw Segundo, APRN - CNP [facility]   6/16/2020  1:45 PM Tiana Pereyra MD [facility]            There is no problem list on file for this patient.

## 2020-03-31 ENCOUNTER — TELEPHONE (OUTPATIENT)
Dept: CASE MANAGEMENT | Age: 55
End: 2020-03-31

## 2020-03-31 NOTE — TELEPHONE ENCOUNTER
Lung Navigator reviewing recent PET ordered by provider for Lung Screening category 4A F/U.   Although not Avid, please review and suggest F/U recommendations-Thank You, [place]

## 2020-04-08 ENCOUNTER — TELEPHONE (OUTPATIENT)
Dept: CASE MANAGEMENT | Age: 55
End: 2020-04-08

## 2020-04-14 ENCOUNTER — TELEPHONE (OUTPATIENT)
Dept: CASE MANAGEMENT | Age: 55
End: 2020-04-14

## 2020-04-14 NOTE — TELEPHONE ENCOUNTER
Name: Amalia Yates  : 1965  MRN: [id number]    Oncology Navigation Follow-Up Note    Contact Type:  Telephone    Subjective:     Objective:     Assistance Needed:     Receptive to Advanced Care Planning / Palliative Care:      Referrals:     Education:     Notes: Lung Navigator reviewing chart and plan to follow.       Electronically signed by Kelly Carroll RN on 2020 at 3:56 PM

## 2020-05-19 ENCOUNTER — TELEPHONE (OUTPATIENT)
Dept: PRIMARY CARE CLINIC | Age: 55
End: 2020-05-19

## 2020-05-19 ENCOUNTER — OFFICE VISIT (OUTPATIENT)
Dept: PRIMARY CARE CLINIC | Age: 55
End: 2020-05-19
Payer: MEDICAID

## 2020-05-19 VITALS
HEIGHT: 72 IN | WEIGHT: 299.8 LBS | HEART RATE: 82 BPM | RESPIRATION RATE: 20 BRPM | BODY MASS INDEX: 40.61 KG/M2 | OXYGEN SATURATION: 93 % | TEMPERATURE: 98.1 F | DIASTOLIC BLOOD PRESSURE: 64 MMHG | SYSTOLIC BLOOD PRESSURE: 132 MMHG

## 2020-05-19 PROCEDURE — G8417 CALC BMI ABV UP PARAM F/U: HCPCS | Performed by: NURSE PRACTITIONER

## 2020-05-19 PROCEDURE — 99214 OFFICE O/P EST MOD 30 MIN: CPT | Performed by: NURSE PRACTITIONER

## 2020-05-19 PROCEDURE — 3017F COLORECTAL CA SCREEN DOC REV: CPT | Performed by: NURSE PRACTITIONER

## 2020-05-19 PROCEDURE — G8427 DOCREV CUR MEDS BY ELIG CLIN: HCPCS | Performed by: NURSE PRACTITIONER

## 2020-05-19 PROCEDURE — 4004F PT TOBACCO SCREEN RCVD TLK: CPT | Performed by: NURSE PRACTITIONER

## 2020-05-19 RX ORDER — ZOLPIDEM TARTRATE 12.5 MG/1
12.5 TABLET, FILM COATED, EXTENDED RELEASE ORAL NIGHTLY PRN
Qty: 30 TABLET | Refills: 2 | Status: SHIPPED | OUTPATIENT
Start: 2020-05-19 | End: 2020-08-03

## 2020-05-19 RX ORDER — CLINDAMYCIN HYDROCHLORIDE 300 MG/1
300 CAPSULE ORAL 3 TIMES DAILY
Qty: 21 CAPSULE | Refills: 0 | Status: SHIPPED | OUTPATIENT
Start: 2020-05-19 | End: 2020-05-26

## 2020-05-19 ASSESSMENT — ENCOUNTER SYMPTOMS
ABDOMINAL PAIN: 0
WHEEZING: 0
SHORTNESS OF BREATH: 0
CONSTIPATION: 0
DIARRHEA: 0
BLURRED VISION: 0
VOMITING: 0
SORE THROAT: 0
NAUSEA: 0
RHINORRHEA: 0
COUGH: 0

## 2020-05-19 NOTE — PROGRESS NOTES
Name: Jennifer Ballesteros  : 1965         Chief Complaint:     Chief Complaint   Patient presents with    Hypertension     Routine office visit.  Insomnia     \"ambien not working. \"     Arm Pain     'hit right elbow against the wall while sleeping. \" \"Right elbow painful and swollen\"        History of Present Illness:      Jennifer Ballesteros is a 63 y.o.  male who presents with Hypertension (Routine office visit. ); Insomnia (\"ambien not working. \" ); and Arm Pain ('hit right elbow against the wall while sleeping. \" \"Right elbow painful and swollen\" )      Grady Strauss is here today for a routine office visit. Insomnia- patient states immediate release Ambien is not helpful. Patient states he is able to fall asleep but does not stay asleep. Patient states he was previously on controlled release with great result. Hypertension   This is a chronic problem. The current episode started more than 1 year ago. The problem has been gradually improving since onset. The problem is controlled. Associated symptoms include peripheral edema (Greatly improved). Pertinent negatives include no anxiety, blurred vision, chest pain, headaches, malaise/fatigue, neck pain, palpitations, shortness of breath or sweats. There are no associated agents to hypertension. Risk factors for coronary artery disease include sedentary lifestyle, stress, obesity, male gender, family history and dyslipidemia. Past treatments include diuretics and beta blockers. The current treatment provides moderate improvement. Compliance problems include exercise and diet. There is no history of kidney disease, CAD/MI, CVA or heart failure. There is no history of chronic renal disease. Arm Pain    The incident occurred more than 1 week ago (Right elbow). The incident occurred at home. The injury mechanism was a direct blow. The pain is present in the right elbow. The quality of the pain is described as aching. The pain is at a severity of 4/10.  The pain is moderate. The pain has been intermittent since the incident. Pertinent negatives include no chest pain, muscle weakness, numbness or tingling. Associated symptoms comments: Swelling. The symptoms are aggravated by palpation and movement. He has tried acetaminophen, NSAIDs and rest for the symptoms. The treatment provided moderate relief. Past Medical History:     Past Medical History:   Diagnosis Date    COPD (chronic obstructive pulmonary disease) ([facility] [address]     Hypertension       Reviewed all health maintenance requirements and ordered appropriate tests  Health Maintenance Due   Topic Date Due    Lipid screen  04/24/2004       Past Surgical History:     Past Surgical History:   Procedure Laterality Date    ANKLE SURGERY      CARPAL TUNNEL RELEASE Bilateral     HERNIA REPAIR      KNEE ARTHROSCOPY      NECK SURGERY          Medications:       Prior to Admission medications    Medication Sig Start Date End Date Taking? Authorizing Provider   zolpidem (AMBIEN CR) 12.5 MG extended release tablet Take 1 tablet by mouth nightly as needed for Sleep for up to 90 days.  5/19/20 8/17/20 Yes Vadim Glatter Might, APRN - CNP   clindamycin (CLEOCIN) 300 MG capsule Take 1 capsule by mouth 3 times daily for 7 days 5/19/20 5/26/20 Yes [place] Glatter Might, APRN - CNP   furosemide (LASIX) 40 MG tablet Take 1 tablet by mouth daily 3/30/20  Yes Vadim Glatter Might, APRN - CNP   nicotine (NICODERM CQ) 14 MG/24HR Place 1 patch onto the skin every 24 hours 3/30/20 3/30/21 Yes Guerrero W Might, APRN - CNP   albuterol (PROVENTIL) (2.5 MG/3ML) 0.083% nebulizer solution Take 3 mLs by nebulization every 6 hours as needed for Wheezing 3/23/20  Yes Vadim Glatter Might, APRN - CNP   ammonium lactate (AMLACTIN) 12 % cream  2/17/20  Yes Historical Provider, MD   Colchicine (COLCRYS PO) Take 1 tablet by mouth 2 times daily    Yes Historical Provider, MD   oxymetazoline (AFRIN) 0.05 % nasal spray 2 sprays by Nasal route 2 times daily   Yes Historical Provider, MD bursitis of right elbow  clindamycin (CLEOCIN) 300 MG capsule       1.  Rajesh Valadez received counseling on the following healthy behaviors: nutrition, exercise and medication adherence  2. Patient given educational materials - see patient instructions  3. Was a self-tracking handout given in paper form or via [employer]? No  If yes, see orders or list here. 4.  Discussed use, benefit, and side effects of prescribed medications. Barriers to medication compliance addressed. All patient questions answered. Pt voiced understanding. 5.  Reviewed prior labs and health maintenance  6. Continue current medications, diet and exercise. Completed Refills   Requested Prescriptions     Signed Prescriptions Disp Refills    zolpidem (AMBIEN CR) 12.5 MG extended release tablet 30 tablet 2     Sig: Take 1 tablet by mouth nightly as needed for Sleep for up to 90 days.  clindamycin (CLEOCIN) 300 MG capsule 21 capsule 0     Sig: Take 1 capsule by mouth 3 times daily for 7 days         Return in about 6 months (around 11/19/2020) for check up.

## 2020-05-20 ENCOUNTER — TELEPHONE (OUTPATIENT)
Dept: CASE MANAGEMENT | Age: 55
End: 2020-05-20

## 2020-05-26 ENCOUNTER — TELEPHONE (OUTPATIENT)
Dept: CASE MANAGEMENT | Age: 55
End: 2020-05-26

## 2020-05-26 ENCOUNTER — TELEPHONE (OUTPATIENT)
Dept: PRIMARY CARE CLINIC | Age: 55
End: 2020-05-26

## 2020-05-26 RX ORDER — ALBUTEROL SULFATE 2.5 MG/3ML
SOLUTION RESPIRATORY (INHALATION)
Qty: 300 ML | Refills: 0 | Status: SHIPPED | OUTPATIENT
Start: 2020-05-26 | End: 2020-07-23

## 2020-05-26 NOTE — TELEPHONE ENCOUNTER
Called patient and informed him that Ashley Cervantes was referring him to Dr Irlanda Valdez, orthopedic surgeon and they will be contacting him to set up an appt. Verbalizes understanding.
Referral made. Thank you.
[facility]            There is no problem list on file for this patient.

## 2020-05-26 NOTE — TELEPHONE ENCOUNTER
Health Maintenance   Topic Date Due    Lipid screen  04/24/2004    DTaP/Tdap/Td vaccine (1 - Tdap) 02/17/2021 (Originally 4/24/1983)    Flu vaccine (Season Ended) 02/17/2021 (Originally 9/1/2020)    Shingles Vaccine (1 of 2) 02/17/2021 (Originally 4/24/2014)    HIV screen  02/17/2021 (Originally 4/24/1979)    Hepatitis A vaccine (1 of 2 - Risk 2-dose series) 03/09/2021 (Originally 4/24/1965)    Hepatitis B vaccine (1 of 3 - Risk 3-dose series) 03/09/2021 (Originally 4/24/1983)    Pneumococcal 0-64 years Vaccine (1 of 1 - PPSV23) 03/09/2021 (Originally 4/24/1970)    Colon Cancer Screen FIT/FOBT  03/13/2021    Potassium monitoring  03/23/2021    Creatinine monitoring  03/23/2021    Hepatitis C screen  Completed    Hib vaccine  Aged Out    Meningococcal (ACWY) vaccine  Aged Out             (applicable per patient's age: Cancer Screenings, Depression Screening, Fall Risk Screening, Immunizations)    BUN (mg/dL)   Date Value   03/23/2020 25 (H)      (goal A1C is < 7)   (goal LDL is <100) need 30-50% reduction from baseline     BP Readings from Last 3 Encounters:   05/19/20 132/64   03/09/20 128/73   03/06/20 135/71    (goal /80)      All Future Testing planned in [facility]:  Lab Frequency Next Occurrence   CBC Auto Differential Once 11/19/2020   Comprehensive Metabolic Panel Once 54/15/2618   Lipid Panel Once 11/19/2020       Next Visit Date:  Future Appointments   Date Time Provider Vinayak Cornell   5/29/2020  9:20 AM Mana Zamudio MD [facility]   6/16/2020  1:45 PM Meg Monique MD [facility]   11/19/2020 11:20 AM ADRIAN Quintanilla - CNP [facility]            There is no problem list on file for this patient.

## 2020-05-29 ENCOUNTER — VIRTUAL VISIT (OUTPATIENT)
Dept: PULMONOLOGY | Age: 55
End: 2020-05-29
Payer: MEDICAID

## 2020-05-29 PROCEDURE — G8427 DOCREV CUR MEDS BY ELIG CLIN: HCPCS | Performed by: INTERNAL MEDICINE

## 2020-05-29 PROCEDURE — 99244 OFF/OP CNSLTJ NEW/EST MOD 40: CPT | Performed by: INTERNAL MEDICINE

## 2020-05-29 NOTE — PROGRESS NOTES
[facility]  [facility] PART OF [address] [facility] [address] [place] [address]  Dept: [phone]  Dept Fax: [phone]      5/29/20    Patient: Suresh Tee  YOB: 1965    Dear [address] [place] [address], APRN - CNP,    I had the pleasure of seeing one of your patients, Mel Tyson today in the office today. Please find attached my note with the assessment and plan of care. Thank you for allowing me to participate in the care of this patient. I will keep you updated on this patient's follow up and I look forward to serving you and your patients again in the future.     Petros Strickland MD  5/29/2020 12:50 PM

## 2020-05-29 NOTE — PROGRESS NOTES
buPROPion (WELLBUTRIN XL) 150 MG extended release tablet Take 1 tablet by mouth every morning 30 tablet 3     No current facility-administered medications for this visit. FAMILY HISTORY: family history includes Diabetes in his father. SOCIAL AND OCCUPATIONAL HEALTH:  The patient is a Past smoker of more than 30 pack years. There  is not history of TB or TB exposure. There is not asbestos or silica dust exposure. The patient reports does not have coal, foundry, quarry or [employer] exposure. Travel history reveals no significant history of risk factors for pulm disease. There is not  history of recreational or IV drug use. The patient does not pets, dogs, cats turtles or exotic birds. Review of Systems:  Review of Systems -   General ROS: Completed and except as mentioned above were negative   Psychological ROS:  Completed and except as mentioned above were negative  Ophthalmic ROS:  Completed and except as mentioned above were negative  ENT ROS:  Completed and except as mentioned above were negative  Allergy and Immunology ROS:  Completed and except as mentioned above were negative  Hematological and Lymphatic ROS:  Completed and except as mentioned above were negative  Endocrine ROS: Completed and except as mentioned above were negative  Breast ROS:  Completed and except as mentioned above were negative  Respiratory ROS:  Completed and except as mentioned above were negative  Cardiovascular ROS:  Completed and except as mentioned above were negative  Gastrointestinal ROS: Completed and except as mentioned above were negative  Genito-Urinary ROS:  Completed and except as mentioned above were negative  Musculoskeletal ROS:  Completed and except as mentioned above were negative  Neurological ROS:  Completed and except as mentioned above were negative  Dermatological ROS:  Completed and except as mentioned above were negative    SLEEP  No epistaxis or sore throat.   Has daytime fatigue and tiredness and sleepiness associated with snoring and unrefreshed sleep. No MVA. No edema. PHYSICAL EXAMINATION:  There were no vitals filed for this visit. PHYSICAL EXAMINATION:There were no vitals filed for this visit. PHYSICAL EXAMINATION:  Due to this being a TeleHealth encounter, evaluation of the following organ systems is limited: Vitals/Constitutional/EENT/Resp/CV/GI//MS/Neuro/Skin/Heme-Lymph-Imm. Constitutional: [x] Appears well-developed and well-nourished. [] Abnormal  Mental status  [x] Alert and awake  [x] Oriented to person/place/time [x]Able to follow commands    [x] No apparent distress      Eyes:  EOM    [x]  Normal  [] Abnormal-  Sclera  [x]  Normal  [] Abnormal -         Discharge [x]  None visible  [] Abnormal -    HENT:   [x] Normocephalic, atraumatic. [] Abnormal shaped head   [x] Mouth/Throat: Mucous membranes are moist.     Ears [x] Normal  [] Abnormal-    Neck: [x] Normal range of motion [x] Supple [] No visualized mass. Pulmonary/Chest: [x] Respiratory effort normal.  [x] No visualized signs of difficulty breathing or respiratory distress        [] Abnormal      Musculoskeletal:   [x] Normal range of motion. [] Normal gait with no signs of ataxia. [x]  No signs of cyanosis of the peripheral portions of extremities. [] Abnormal       Neurological:        [x] Normal cranial nerve (limited exam to video visit) [] No focal weakness observed       [] Abnormal          Speech       [x] Normal   [] Abnormal     Skin:        [x] No rash on visible skin  [x] Normal  [] Abnormal     Psychiatric:       [x] Normal  [] Abnormal        [x] Normal Mood  [] Anxious appearing      Other pertinent exam findings:-              DATA:     pft's ordered        CXR: REVIEWED: None    CT scan of the chest to screen for lung cancer done in February 2020 showed a right lower lobe 9 mm lung nodule in the right middle lobe 3 mm lung nodule.     CBC showed a hemoglobin of with treatment of sleep apnea. Pt is not to drive if sleepy. We'll see the patient back in 3 months or earlier if needed based on findings on the CT scan of the chest.  Patient will call us if he is sick, so he can be seen sooner. Thank you for having us involved in the care of your patient. Please call us if you have any questions or concerns.               Zoila Mccormack MD  5/29/2020 12:50 PM

## 2020-06-19 ENCOUNTER — HOSPITAL ENCOUNTER (OUTPATIENT)
Dept: PREADMISSION TESTING | Age: 55
Setting detail: SPECIMEN
Discharge: HOME | End: 2020-06-23
Payer: MEDICAID

## 2020-06-19 DIAGNOSIS — Z01.818 PREOP TESTING: Primary | ICD-10-CM

## 2020-06-19 PROCEDURE — U0003 INFECTIOUS AGENT DETECTION BY NUCLEIC ACID (DNA OR RNA); SEVERE ACUTE RESPIRATORY SYNDROME CORONAVIRUS 2 (SARS-COV-2) (CORONAVIRUS DISEASE [COVID-19]), AMPLIFIED PROBE TECHNIQUE, MAKING USE OF HIGH THROUGHPUT TECHNOLOGIES AS DESCRIBED BY CMS-2020-01-R: HCPCS

## 2020-06-21 LAB — SARS-COV-2, NAA: NOT DETECTED

## 2020-06-22 ENCOUNTER — HOSPITAL ENCOUNTER (OUTPATIENT)
Dept: CT IMAGING | Age: 55
Discharge: HOME | End: 2020-06-24
Payer: MEDICAID

## 2020-06-22 ENCOUNTER — HOSPITAL ENCOUNTER (OUTPATIENT)
Dept: PULMONOLOGY | Age: 55
Discharge: HOME | End: 2020-06-22
Payer: MEDICAID

## 2020-06-22 PROCEDURE — 71250 CT THORAX DX C-: CPT

## 2020-06-22 PROCEDURE — 6370000000 HC RX 637 (ALT 250 FOR IP): Performed by: INTERNAL MEDICINE

## 2020-06-22 PROCEDURE — 94729 DIFFUSING CAPACITY: CPT

## 2020-06-22 PROCEDURE — 94726 PLETHYSMOGRAPHY LUNG VOLUMES: CPT

## 2020-06-22 PROCEDURE — 94060 EVALUATION OF WHEEZING: CPT

## 2020-06-22 PROCEDURE — 94664 DEMO&/EVAL PT USE INHALER: CPT

## 2020-06-22 RX ORDER — ALBUTEROL SULFATE 90 UG/1
4 AEROSOL, METERED RESPIRATORY (INHALATION) ONCE
Status: COMPLETED | OUTPATIENT
Start: 2020-06-22 | End: 2020-06-22

## 2020-06-22 RX ADMIN — ALBUTEROL SULFATE 4 PUFF: 90 AEROSOL, METERED RESPIRATORY (INHALATION) at 16:20

## 2020-07-23 RX ORDER — ALBUTEROL SULFATE 2.5 MG/3ML
SOLUTION RESPIRATORY (INHALATION)
Qty: 300 ML | Refills: 0 | Status: SHIPPED | OUTPATIENT
Start: 2020-07-23 | End: 2020-08-20

## 2020-07-23 NOTE — TELEPHONE ENCOUNTER
Health Maintenance   Topic Date Due    Lipid screen  04/24/2004    DTaP/Tdap/Td vaccine (1 - Tdap) 02/17/2021 (Originally 4/24/1983)    Shingles Vaccine (1 of 2) 02/17/2021 (Originally 4/24/2014)    HIV screen  02/17/2021 (Originally 4/24/1979)    Hepatitis A vaccine (1 of 2 - Risk 2-dose series) 03/09/2021 (Originally 4/24/1965)    Hepatitis B vaccine (1 of 3 - Risk 3-dose series) 03/09/2021 (Originally 4/24/1983)    Pneumococcal 0-64 years Vaccine (1 of 1 - PPSV23) 03/09/2021 (Originally 4/24/1970)    Flu vaccine (1) 09/01/2020    Colon Cancer Screen FIT/FOBT  03/13/2021    Potassium monitoring  03/23/2021    Creatinine monitoring  03/23/2021    Low dose CT lung screening  06/22/2021    Hepatitis C screen  Completed    Hib vaccine  Aged Out    Meningococcal (ACWY) vaccine  Aged Out             (applicable per patient's age: Cancer Screenings, Depression Screening, Fall Risk Screening, Immunizations)    BUN (mg/dL)   Date Value   03/23/2020 25 (H)      (goal A1C is < 7)   (goal LDL is <100) need 30-50% reduction from baseline     BP Readings from Last 3 Encounters:   05/19/20 132/64   03/09/20 128/73   03/06/20 135/71    (goal /80)      All Future Testing planned in [facility]:  Lab Frequency Next Occurrence   CBC Auto Differential Once 11/19/2020   Comprehensive Metabolic Panel Once 34/50/6688   Lipid Panel Once 11/19/2020   Sleep Study with PAP Titration Once 05/29/2020   Baseline Diagnostic Sleep Study Once 05/29/2020       Next Visit Date:  Future Appointments   Date Time Provider Vinayak Cornell   8/3/2020 10:30 AM Myra Bolaños MD [facility]   9/1/2020  1:45 PM Jaida Houser MD [facility]   11/19/2020 11:20 AM Ethel Araya Might, APRN - CNP [facility]            There is no problem list on file for this patient.

## 2020-08-03 ENCOUNTER — TELEPHONE (OUTPATIENT)
Dept: PRIMARY CARE CLINIC | Age: 55
End: 2020-08-03

## 2020-08-03 RX ORDER — ZOLPIDEM TARTRATE 10 MG/1
10 TABLET ORAL NIGHTLY PRN
Qty: 30 TABLET | Refills: 2 | Status: SHIPPED | OUTPATIENT
Start: 2020-08-03 | End: 2020-12-01 | Stop reason: SDUPTHER

## 2020-08-03 NOTE — PROGRESS NOTES
Controlled Substance Monitoring:    Acute and Chronic Pain Monitoring:   RX Monitoring 8/3/2020   Periodic Controlled Substance Monitoring No signs of potential drug abuse or diversion identified.

## 2020-08-03 NOTE — TELEPHONE ENCOUNTER
Can you contact patient let him know that his medication was denied. We can try sending in a different medication or he can obtain a coupon for this medication. It looks like a coupon would run him around 20 some dollars a month.   Thank you

## 2020-08-24 ENCOUNTER — OFFICE VISIT (OUTPATIENT)
Dept: PULMONOLOGY | Age: 55
End: 2020-08-24
Payer: MEDICAID

## 2020-08-24 VITALS
BODY MASS INDEX: 41.99 KG/M2 | HEART RATE: 75 BPM | OXYGEN SATURATION: 90 % | TEMPERATURE: 97.6 F | WEIGHT: 310 LBS | RESPIRATION RATE: 16 BRPM | DIASTOLIC BLOOD PRESSURE: 94 MMHG | SYSTOLIC BLOOD PRESSURE: 136 MMHG | HEIGHT: 72 IN

## 2020-08-24 PROCEDURE — 99214 OFFICE O/P EST MOD 30 MIN: CPT | Performed by: INTERNAL MEDICINE

## 2020-08-24 PROCEDURE — 3023F SPIROM DOC REV: CPT | Performed by: INTERNAL MEDICINE

## 2020-08-24 PROCEDURE — G8926 SPIRO NO PERF OR DOC: HCPCS | Performed by: INTERNAL MEDICINE

## 2020-08-24 PROCEDURE — 3017F COLORECTAL CA SCREEN DOC REV: CPT | Performed by: INTERNAL MEDICINE

## 2020-08-24 PROCEDURE — 4004F PT TOBACCO SCREEN RCVD TLK: CPT | Performed by: INTERNAL MEDICINE

## 2020-08-24 PROCEDURE — G8427 DOCREV CUR MEDS BY ELIG CLIN: HCPCS | Performed by: INTERNAL MEDICINE

## 2020-08-24 PROCEDURE — G8417 CALC BMI ABV UP PARAM F/U: HCPCS | Performed by: INTERNAL MEDICINE

## 2020-08-24 NOTE — PATIENT INSTRUCTIONS
SURVEY:    You may be receiving a survey from [employer] regarding your visit today. Please complete the survey to enable us to provide the highest quality of care to you and your family. If you cannot score us a very good on any question, please call the office to discuss how we could have made your experience a very good one. Thank you. Please recheck your blood pressure when you go home and make sure you take your prescribed medication if applicable . Please follow up with your PCP or ER if needed. Patient Education        Learning About Coronavirus [phone])  Coronavirus [phone]): Overview  What is coronavirus ([id number])? The coronavirus disease (COVID-19) is caused by a virus. It is an illness that was first found in [place], [place], in December 2019. It has since spread worldwide. The virus can cause fever, cough, and trouble breathing. In severe cases, it can cause pneumonia and make it hard to breathe without help. It can cause death. Coronaviruses are a large group of viruses. They cause the common cold. They also cause more serious illnesses like Middle East respiratory syndrome (MERS) and severe acute respiratory syndrome (SARS). COVID-19 is caused by a novel coronavirus. That means it's a new type that has not been seen in people before. This virus spreads person-to-person through droplets from coughing and sneezing. It can also spread when you are close to someone who is infected. And it can spread when you touch something that has the virus on it, such as a doorknob or a tabletop. What can you do to protect yourself from coronavirus (COVID-19)? The best way to protect yourself from getting sick is to:  · Avoid areas where there is an outbreak. · Avoid contact with people who may be infected. · Wash your hands often with soap or alcohol-based hand sanitizers. · Avoid crowds and try to stay at least 6 feet away from other people.   · Wash your hands often, especially after you cough or sneeze. Use soap and water, and scrub for at least 20 seconds. If soap and water aren't available, use an alcohol-based hand . · Avoid touching your mouth, nose, and eyes. What can you do to avoid spreading the virus to others? To help avoid spreading the virus to others:  · Cover your mouth with a tissue when you cough or sneeze. Then throw the tissue in the trash. · Use a disinfectant to clean things that you touch often. · Wear a cloth face cover if you have to go to public areas. · Stay home if you are sick or have been exposed to the virus. Don't go to school, work, or public areas. And don't use public transportation, ride-shares, or taxis unless you have no choice. · If you are sick:  ? Leave your home only if you need to get medical care. But call the doctor's office first so they know you're coming. And wear a face cover. ? Wear the face cover whenever you're around other people. It can help stop the spread of the virus when you cough or sneeze. ? Clean and disinfect your home every day. Use household  and disinfectant wipes or sprays. Take special care to clean things that you grab with your hands. These include doorknobs, remote controls, phones, and handles on your refrigerator and microwave. And don't forget countertops, tabletops, bathrooms, and computer keyboards. When to call for help  [id number] anytime you think you may need emergency care. For example, call if:  · You have severe trouble breathing. (You can't talk at all.)  · You have constant chest pain or pressure. · You are severely dizzy or lightheaded. · You are confused or can't think clearly. · Your face and lips have a blue color. · You pass out (lose consciousness) or are very hard to wake up. Call your doctor now if you develop symptoms such as:  · Shortness of breath. · Fever. · Cough. If you need to get care, call ahead to the doctor's office for instructions before you go.  Make sure you wear a face cover to prevent exposing other people to the virus. Where can you get the latest information? The following health organizations are tracking and studying this virus. Their websites contain the most up-to-date information. Pedro Pablo Kava also learn what to do if you think you may have been exposed to the virus. · U.S. Centers for Disease Control and Prevention (CDC): The CDC provides updated news about the disease and travel advice. The website also tells you how to prevent the spread of infection. www.cdc.gov  · World Health Organization [facility]): WHO offers information about the virus outbreaks. WHO also has travel advice. www.who.int  Current as of: May 8, 2020               Content Version: 12.5  © 2006-2020 Healthwise, Incorporated. Care instructions adapted under license by [facility] ([facility]). If you have questions about a medical condition or this instruction, always ask your healthcare professional. [address] any warranty or liability for your use of this information.

## 2020-08-30 NOTE — PROGRESS NOTES
(PROVENTIL) (2.5 MG/3ML) 0.083% nebulizer solution USE 1 VIAL IN NEBULIZER EVERY 6 HOURS AS NEEDED FOR WHEEZING 300 mL 1    zolpidem (AMBIEN) 10 MG tablet Take 1 tablet by mouth nightly as needed for Sleep for up to 90 days. 30 tablet 2    buPROPion (WELLBUTRIN XL) 150 MG extended release tablet TAKE 1 TABLET BY MOUTH ONCE DAILY IN THE MORNING 90 tablet 0    furosemide (LASIX) 40 MG tablet Take 1 tablet by mouth daily 90 tablet 3    nicotine (NICODERM CQ) 14 MG/24HR Place 1 patch onto the skin every 24 hours 30 patch 3    ammonium lactate (AMLACTIN) 12 % cream       Colchicine (COLCRYS PO) Take 1 tablet by mouth 2 times daily       oxymetazoline (AFRIN) 0.05 % nasal spray 2 sprays by Nasal route 2 times daily      triamterene-hydrochlorothiazide (MAXZIDE-25) 37.5-25 MG per tablet Take 1 tablet by mouth daily 90 tablet 1    carvedilol (COREG) 6.25 MG tablet Take 1 tablet by mouth 2 times daily (with meals) 180 tablet 1     No current facility-administered medications for this visit. FAMILY HISTORY: family history includes Diabetes in his father. SOCIAL AND OCCUPATIONAL HEALTH:  The patient is a Past smoker of more than 30 pack years. There  is not history of TB or TB exposure. There is not asbestos or silica dust exposure. The patient reports does not have coal, foundry, quarry or [employer] exposure. Travel history reveals no significant history of risk factors for pulm disease. There is not  history of recreational or IV drug use. The patient does not pets, dogs, cats turtles or exotic birds.         Review of Systems:  Review of Systems -   General ROS: Completed and except as mentioned above were negative   Psychological ROS:  Completed and except as mentioned above were negative  Ophthalmic ROS:  Completed and except as mentioned above were negative  ENT ROS:  Completed and except as mentioned above were negative  Allergy and Immunology ROS:  Completed and except as mentioned above were negative  Hematological and Lymphatic ROS:  Completed and except as mentioned above were negative  Endocrine ROS: Completed and except as mentioned above were negative  Breast ROS:  Completed and except as mentioned above were negative  Respiratory ROS:  Completed and except as mentioned above were negative  Cardiovascular ROS:  Completed and except as mentioned above were negative  Gastrointestinal ROS: Completed and except as mentioned above were negative  Genito-Urinary ROS:  Completed and except as mentioned above were negative  Musculoskeletal ROS:  Completed and except as mentioned above were negative  Neurological ROS:  Completed and except as mentioned above were negative  Dermatological ROS:  Completed and except as mentioned above were negative    SLEEP  No epistaxis or sore throat. Has daytime fatigue and tiredness and sleepiness associated with snoring and unrefreshed sleep. No MVA. No edema. PHYSICAL EXAMINATION:  Vitals:    08/24/20 1230 08/24/20 1233   BP: (!) 150/89 (!) 136/94   Pulse: 76 75   Resp: 16    Temp: 97.6 °F (36.4 °C)    SpO2: 90%    Weight: (!) 310 lb (140.6 kg)    Height: 5' 11.5\" (1.816 m)      PHYSICAL EXAMINATION:  Vitals:    08/24/20 1230 08/24/20 1233   BP: (!) 150/89 (!) 136/94   Pulse: 76 75   Resp: 16    Temp: 97.6 °F (36.4 °C)    SpO2: 90%    Weight: (!) 310 lb (140.6 kg)    Height: 5' 11.5\" (1.816 m)        PHYSICAL EXAMINATION:  Vitals:    08/24/20 1230 08/24/20 1233   BP: (!) 150/89 (!) 136/94   Pulse: 76 75   Resp: 16    Temp: 97.6 °F (36.4 °C)    SpO2: 90%    Weight: (!) 310 lb (140.6 kg)    Height: 5' 11.5\" (1.816 m)      Constitutional: This is a well developed, well nourished, Greater than 40 - Morbid Obesity / Extreme Obesity / Grade III 63y.o. year old male who is alert, oriented, cooperative and in no apparent distress. Head:normocephalic and atraumatic. EENT: EOMI. LUAN. No conjunctival injections.   External canals are patent and no discharge was appreciated. Septum was midline, mucosa was without erythema, exudates or cobblestoning. No thrush was noted. III (soft palate, base of uvula visible)  Neck: Supple without thyromegaly. No elevated JVP. Trachea was midline. No carotid bruits were auscultated. Respiratory: Chest was symmetrical without dullness to percussion. Breath sounds bilaterally were clear to auscultation. There were no wheezes, rhonchi or rales. There is no intercostal retraction or use of accessory muscles. No egophony noted. Cardiovascular: Regular without murmur, clicks, gallops or rubs. There is no left or right ventricular heave. Abdomen: Slightly rounded and soft without organomegaly. No rebound, rigidity or guarding was appreciated. Lymphatic: No lymphadenopathy. Musculoskeletal: Normal curvature of the spine. No gross muscle weakness. Extremities:  No lower extremity edema, ulcerations, tenderness, varicosities or erythema. Muscle size, tone and strength are normal.  No involuntary movements are noted. Skin:  Warm and dry. Good color, turgor and pigmentation. No lesions or scars. No cyanosis or clubbing  Neurological/Psychiatric: The patient's general behavior, level of consciousness, thought content and emotional status is normal.                  DATA:     pft's-stage III COPD with an FEV1 of 48%        CXR: REVIEWED: None    CT scan of the chest was done on June 22 of 2020 and it showed-  No new or enlarging pulmonary nodule.         RECOMMENDATIONS:    Per ACR Lung-RADS Version 1.0         Category 2, Benign appearance or behavior.  Management:  Continue annual lung    screening with LDCT in 12 months. (probability of malignancy <1%). IMPRESSION:   1. ADE (obstructive sleep apnea)    2. Morbid obesity due to excess calories ([address]    3. Chronic obstructive pulmonary disease, unspecified COPD type ([address]    4. Erythrocytosis    5. Essential hypertension    6.  Multiple nodules of lung PLAN:       Refills were provided-none  Patient was recommended to have prednisone and an antibiotic available for use during an exacerbation  Educated and clarified the medication use. Discussed use, benefit, and side effects of prescribed medications. Barriers to medication compliance addressed. Idalia Vee received counseling on the following healthy behaviors: nutrition, exercise and medication adherence  Recommend flu vaccination in the fall annually. Recommendations given regarding pneumococcal vaccinations. Patient is up-to-date with vaccinations from pulmonary perspective. Maintain an active lifestyle. Continue smoking cessation. All the questions that the patient has had were answered to his satisfaction. Supplemental oxygen was to be continued. Patient was informed of the need for using oxygen. Patient was educated on the importance of compliance and the benefits of oxygen use. Complications of oxygen use, including dryness of the nostrils, epistaxis and also the fire hazard were explained to the patient. Patient willingly accepts to use  the oxygen as recommended. Pulmonary function tests were reviewed  CT scan of the chest was reviewed  Will need a CT scan of the chest in June 2021  Pt met the criteria for lung cancer screening and was explained the possibility of having findings that would need monitoring such as lung nodules and the need for more than yearly screening ct chest. Pt acknowledges understanding and questions answered to their satisfaction. Polysomnogram with CPAP/BiPAP titration if needed. Patient will think about it  Weight loss was recommended and discussed. Recommended following good sleep hygiene instructions. Explained importance of compliance with treatment of sleep apnea. Pt is not to drive if sleepy.   We'll see the patient back in 3 months or earlier if needed based on findings on the CT scan of the chest.  Patient will call us if he is sick, so he can be seen sooner. Thank you for having us involved in the care of your patient. Please call us if you have any questions or concerns.               Logan Cedillo MD  8/30/2020 7:27 AM

## 2020-09-01 ENCOUNTER — OFFICE VISIT (OUTPATIENT)
Dept: GASTROENTEROLOGY | Age: 55
End: 2020-09-01
Payer: MEDICAID

## 2020-09-01 VITALS
HEART RATE: 84 BPM | SYSTOLIC BLOOD PRESSURE: 136 MMHG | WEIGHT: 308.8 LBS | RESPIRATION RATE: 18 BRPM | DIASTOLIC BLOOD PRESSURE: 80 MMHG | BODY MASS INDEX: 41.83 KG/M2 | HEIGHT: 72 IN | TEMPERATURE: 98.1 F

## 2020-09-01 PROCEDURE — G8417 CALC BMI ABV UP PARAM F/U: HCPCS | Performed by: INTERNAL MEDICINE

## 2020-09-01 PROCEDURE — 99204 OFFICE O/P NEW MOD 45 MIN: CPT | Performed by: INTERNAL MEDICINE

## 2020-09-01 PROCEDURE — 4004F PT TOBACCO SCREEN RCVD TLK: CPT | Performed by: INTERNAL MEDICINE

## 2020-09-01 PROCEDURE — G8427 DOCREV CUR MEDS BY ELIG CLIN: HCPCS | Performed by: INTERNAL MEDICINE

## 2020-09-01 PROCEDURE — 3017F COLORECTAL CA SCREEN DOC REV: CPT | Performed by: INTERNAL MEDICINE

## 2020-09-01 NOTE — PROGRESS NOTES
Caterina Romero is a 63 y.o. male   YOB: 1965    Blood pressure 136/80, pulse 84, temperature 98.1 °F (36.7 °C), temperature source Tympanic, resp. rate 18, height 5' 11.5\" (1.816 m), weight (!) 308 lb 12.8 oz (140.1 kg). Body mass index is 42.47 kg/m². This patient is a middle-aged gentleman who had a chest CT scan on 2/26/2020 which had an incidental finding of \"partially visualized abdominal ascites with possible cirrhotic morphology liver\". He subsequently had an MRI on  3/9/2020 which revealed that the \"liver is shrunken and nodular in its contour, compatible with cirrhosis. ..trace perisplenic fluid. ..mild perihepatic ascites. ..mild splenomegaly\". It is relevant to note that he received diuretics between the initial CT and the follow-up MRI with over 30 pound weight loss and resolution of significant edema which had been problematic prior to that. He has a long history of heavy alcohol use stating that he drank at least 12 beers a day for about 10 years starting as a teenager after which he began drinking at least 5 shots of liquor a day for approximately 20 years with gradual tapering of his alcohol intake since such that he now rarely drinks at all. He has no history of other high risk behavior such as IV drugs, body piercing, tattoos, hepatitis exposure or blood transfusions. He denies any hepatic or GI symptoms other than occasional constipation which is relieved by eating Oreos or corn on the cob. He denies jaundice, acholic stool, dark urine, pruritus, abdominal pain, nausea, vomiting, weight loss (other than diuresis), fever, chills, diarrhea, melena, hematochezia or hematemesis. Recent laboratory studies were negative for hepatitis B surface antigen, hepatitis B core antibody (IgM), hepatitis A antibody (IgM) and hepatitis C antibody as well as a negative FIT.     Past Medical History:   Diagnosis Date    COPD (chronic obstructive pulmonary disease) [facility]     Hypertension     Liver disease         Past Surgical History:   Procedure Laterality Date    ANKLE SURGERY      CARPAL TUNNEL RELEASE Bilateral     HERNIA REPAIR      KNEE ARTHROSCOPY      x's 5    NECK SURGERY          Family History   Problem Relation Age of Onset    Allergies Mother     Diabetes Father     Esophageal Cancer Father        Social History     Socioeconomic History    Marital status: Single     Spouse name: Not on file    Number of children: Not on file    Years of education: 15    Highest education level: Not on file   Occupational History    Not on file   Social Needs    Financial resource strain: Not hard at all   Ramiro-Rowan insecurity     Worry: Never true     Inability: Never true    Transportation needs     Medical: No     Non-medical: No   Tobacco Use    Smoking status: Current Some Day Smoker     Packs/day: 2.00     Years: 42.00     Pack years: 84.00     Types: Cigarettes    Smokeless tobacco: Never Used   Substance and Sexual Activity    Alcohol use: Yes     Frequency: Never     Comment: rarely-hx of heavy alcohol use    Drug use: Not Currently     Types: Marijuana    Sexual activity: Not on file   Lifestyle    Physical activity     Days per week: Not on file     Minutes per session: Not on file    Stress: Not on file   Relationships    Social connections     Talks on phone: Not on file     Gets together: Not on file     Attends Sikhism service: Not on file     Active member of club or organization: Not on file     Attends meetings of clubs or organizations: Not on file     Relationship status: Not on file    Intimate partner violence     Fear of current or ex partner: Not on file     Emotionally abused: Not on file     Physically abused: Not on file     Forced sexual activity: Not on file   Other Topics Concern    Not on file   Social History Narrative    Not on file       Outpatient Medications Marked as Taking for the 9/1/20 encounter (Office Visit) with Antoine Bautista MD Medication Sig Dispense Refill    SYMBICORT 160-4.5 MCG/ACT AERO Inhale 2 puffs by mouth twice daily (Patient taking differently: 1 puff 3 times daily ) 11 g 5    albuterol (PROVENTIL) (2.5 MG/3ML) 0.083% nebulizer solution USE 1 VIAL IN NEBULIZER EVERY 6 HOURS AS NEEDED FOR WHEEZING 300 mL 1    zolpidem (AMBIEN) 10 MG tablet Take 1 tablet by mouth nightly as needed for Sleep for up to 90 days.  30 tablet 2    buPROPion (WELLBUTRIN XL) 150 MG extended release tablet TAKE 1 TABLET BY MOUTH ONCE DAILY IN THE MORNING 90 tablet 0    furosemide (LASIX) 40 MG tablet Take 1 tablet by mouth daily 90 tablet 3    nicotine (NICODERM CQ) 14 MG/24HR Place 1 patch onto the skin every 24 hours 30 patch 3    ammonium lactate (AMLACTIN) 12 % cream       Colchicine (COLCRYS PO) Take 1 tablet by mouth 2 times daily       oxymetazoline (AFRIN) 0.05 % nasal spray 2 sprays by Nasal route 2 times daily      triamterene-hydrochlorothiazide (MAXZIDE-25) 37.5-25 MG per tablet Take 1 tablet by mouth daily 90 tablet 1    carvedilol (COREG) 6.25 MG tablet Take 1 tablet by mouth 2 times daily (with meals) 180 tablet 1       Allergies as of 09/01/2020 - Review Complete 09/01/2020   Allergen Reaction Noted    Pcn [penicillins] Rash 02/17/2020         Review of systems:   General: Completed and, except as mentioned above, was negative or noncontributory  Psychological:  Completed and, except as mentioned above, was negative or noncontributory  Ophthalmic:  Completed and, except as mentioned above, was negative or noncontributory  ENT:  Completed and, except as mentioned above, was negative or noncontributory  Allergy and Immunology:  Completed and, except as mentioned above, was negative or noncontributory  Hematological and Lymphatic:  Completed and, except as mentioned above, was negative or noncontributory  Endocrine: Completed and, except as mentioned above, was negative or noncontributory  Breast:  Completed and, except as mentioned above, was negative or noncontributory  Respiratory:  Completed and, except as mentioned above, was negative or noncontributory  Cardiovascular:  Completed and, except as mentioned above, was negative or noncontributory  Gastrointestinal: See HPI  Genito-Urinary:  Completed and, except as mentioned above, was negative or noncontributory  Musculoskeletal:  Completed and, except as mentioned above, was negative or noncontributory  Neurological:  Completed and, except as mentioned above, was negative or noncontributory  Dermatological:  Completed and, except as mentioned above, was negative or noncontributory      Physical exam:  Constitutional - Well-developed, well-nourished, morbidly obese in no acute distress. Vital signs, height and weight are as documented above.     Mental Status /Psychiatric - alert, oriented to person, place, and time, normal mood, behavior, speech, dress, motor activity, and thought processes    Eyes - pupils equal and reactive, extraocular eye movements intact, sclera anicteric, conjunctiva and eyelids normal    Neck - supple, no significant adenopathy, trachea midline, thyroid not enlarged     Respiratory - clear to auscultation, no wheezes, rales or rhonchi, symmetric air entry, no tachypnea, retractions or cyanosis, no evidence of increased respiratory effort     Cardiovascular - normal rate, regular rhythm, normal S1, S2, no murmurs, rubs, clicks or gallops, normal bilateral carotid upstroke without bruits, no JVD     Gastrointestinal - soft, nontender, nondistended, no masses or organomegaly, bowel sounds normal, no hernias noted     Musculoskeletal - no joint tenderness, deformity or swelling, no muscular tenderness noted, normal range of motion     Extremities - peripheral pulses normal, no pedal edema, no clubbing or cyanosis     Skin - normal coloration and turgor, no rashes, no suspicious skin lesions noted, no stigmata of chronic liver disease    His liver disease is

## 2020-09-01 NOTE — PATIENT INSTRUCTIONS
your mouth, nose, and eyes. What can you do to avoid spreading the virus to others? To help avoid spreading the virus to others:  · Cover your mouth with a tissue when you cough or sneeze. Then throw the tissue in the trash. · Use a disinfectant to clean things that you touch often. · Wear a cloth face cover if you have to go to public areas. · Stay home if you are sick or have been exposed to the virus. Don't go to school, work, or public areas. And don't use public transportation, ride-shares, or taxis unless you have no choice. · If you are sick:  ? Leave your home only if you need to get medical care. But call the doctor's office first so they know you're coming. And wear a face cover. ? Wear the face cover whenever you're around other people. It can help stop the spread of the virus when you cough or sneeze. ? Clean and disinfect your home every day. Use household  and disinfectant wipes or sprays. Take special care to clean things that you grab with your hands. These include doorknobs, remote controls, phones, and handles on your refrigerator and microwave. And don't forget countertops, tabletops, bathrooms, and computer keyboards. When to call for help  [id number] anytime you think you may need emergency care. For example, call if:  · You have severe trouble breathing. (You can't talk at all.)  · You have constant chest pain or pressure. · You are severely dizzy or lightheaded. · You are confused or can't think clearly. · Your face and lips have a blue color. · You pass out (lose consciousness) or are very hard to wake up. Call your doctor now if you develop symptoms such as:  · Shortness of breath. · Fever. · Cough. If you need to get care, call ahead to the doctor's office for instructions before you go. Make sure you wear a face cover to prevent exposing other people to the virus. Where can you get the latest information?   The following health organizations are tracking and studying this virus. Their websites contain the most up-to-date information. Giovanyjoselyn Bland also learn what to do if you think you may have been exposed to the virus. · U.S. Centers for Disease Control and Prevention (CDC): The CDC provides updated news about the disease and travel advice. The website also tells you how to prevent the spread of infection. www.cdc.gov  · World Health Organization [facility]): WHO offers information about the virus outbreaks. WHO also has travel advice. www.who.int  Current as of: May 8, 2020               Content Version: 12.5  © 9470-1254 Healthwise, Incorporated. Care instructions adapted under license by [facility] ([facility]). If you have questions about a medical condition or this instruction, always ask your healthcare professional. [address] any warranty or liability for your use of this information.

## 2020-09-04 ENCOUNTER — HOSPITAL ENCOUNTER (OUTPATIENT)
Age: 55
Discharge: HOME | End: 2020-09-04
Payer: MEDICAID

## 2020-09-04 LAB
AFP: 2.6 UG/L
ALBUMIN SERPL-MCNC: 4.4 G/DL (ref 3.5–5.2)
ALBUMIN/GLOBULIN RATIO: 1.4 (ref 1–2.5)
ALP BLD-CCNC: 68 U/L (ref 40–129)
ALT SERPL-CCNC: 32 U/L (ref 5–41)
AST SERPL-CCNC: 25 U/L
BILIRUB SERPL-MCNC: 0.23 MG/DL (ref 0.3–1.2)
BILIRUBIN DIRECT: <0.08 MG/DL
BILIRUBIN, INDIRECT: ABNORMAL MG/DL (ref 0–1)
CERULOPLASMIN: 23 MG/DL (ref 15–30)
FERRITIN: 523 UG/L (ref 30–400)
GLOBULIN: ABNORMAL G/DL (ref 1.5–3.8)
HBV SURFACE AB TITR SER: <3.5 MIU/ML
HEPATITIS B SURFACE ANTIGEN: NONREACTIVE
INR BLD: 1
IRON SATURATION: 27 % (ref 20–55)
IRON: 94 UG/DL (ref 59–158)
PROTHROMBIN TIME: 12.4 SEC (ref 11.5–14.2)
TOTAL IRON BINDING CAPACITY: 346 UG/DL (ref 250–450)
TOTAL PROTEIN: 7.6 G/DL (ref 6.4–8.3)
UNSATURATED IRON BINDING CAPACITY: 252 UG/DL (ref 112–347)

## 2020-09-04 PROCEDURE — 82103 ALPHA-1-ANTITRYPSIN TOTAL: CPT

## 2020-09-04 PROCEDURE — 86376 MICROSOMAL ANTIBODY EACH: CPT

## 2020-09-04 PROCEDURE — 87350 HEPATITIS BE AG IA: CPT

## 2020-09-04 PROCEDURE — 83550 IRON BINDING TEST: CPT

## 2020-09-04 PROCEDURE — 82105 ALPHA-FETOPROTEIN SERUM: CPT

## 2020-09-04 PROCEDURE — 87340 HEPATITIS B SURFACE AG IA: CPT

## 2020-09-04 PROCEDURE — 86038 ANTINUCLEAR ANTIBODIES: CPT

## 2020-09-04 PROCEDURE — 82390 ASSAY OF CERULOPLASMIN: CPT

## 2020-09-04 PROCEDURE — 83540 ASSAY OF IRON: CPT

## 2020-09-04 PROCEDURE — 80076 HEPATIC FUNCTION PANEL: CPT

## 2020-09-04 PROCEDURE — 82728 ASSAY OF FERRITIN: CPT

## 2020-09-04 PROCEDURE — 36415 COLL VENOUS BLD VENIPUNCTURE: CPT

## 2020-09-04 PROCEDURE — 82104 ALPHA-1-ANTITRYPSIN PHENO: CPT

## 2020-09-04 PROCEDURE — 86707 HEPATITIS BE ANTIBODY: CPT

## 2020-09-04 PROCEDURE — 86317 IMMUNOASSAY INFECTIOUS AGENT: CPT

## 2020-09-04 PROCEDURE — 83516 IMMUNOASSAY NONANTIBODY: CPT

## 2020-09-04 PROCEDURE — 85610 PROTHROMBIN TIME: CPT

## 2020-09-06 LAB
HEPATITIS BE ANTIBODY: NEGATIVE
HEPATITIS BE ANTIGEN: NEGATIVE
SMOOTH MUSCLE ANTIBODY: 11 UNITS (ref 0–19)

## 2020-09-07 LAB
ALPHA-1 ANTITRYPSIN PHENOTYPE: NORMAL
ALPHA-1 ANTITRYPSIN: 127 MG/DL (ref 90–200)
LIVER-KIDNEY MICROSOMAL AB: NORMAL

## 2020-09-08 LAB — ANTI-NUCLEAR ANTIBODY (ANA): NEGATIVE

## 2020-09-21 ENCOUNTER — TELEPHONE (OUTPATIENT)
Dept: PRIMARY CARE CLINIC | Age: 55
End: 2020-09-21

## 2020-09-21 ENCOUNTER — TELEPHONE (OUTPATIENT)
Dept: GASTROENTEROLOGY | Age: 55
End: 2020-09-21

## 2020-09-21 NOTE — TELEPHONE ENCOUNTER
It looks like Dr. Arianna Gonzalez wanted him to follow-up with another GI doctor. I will refer him to [facility].   Thank you

## 2020-09-21 NOTE — TELEPHONE ENCOUNTER
Contacted patient to let him know that his procedure 11/09/2020 with Dr. Kristina Bullock is cancelled due to Dr. Kristina Bullock retiring. Patient wanted to know his lab results before deciding if he was going to follow up with his PCP or what his next step was going to be. Call transferred to [employer] for [facility] to give the results.

## 2020-09-21 NOTE — TELEPHONE ENCOUNTER
Patient called the office stating he was scheduled for a procedure with Dr. Castillo Weaver but he is about to retire. Patient has had labs done recently by Dr. Castillo Weaver and was going to have a EGD done and pt does not know if he needs that still with the lab results he had. If the test is still needed pt states he will need a new referral to a different GI.                       Health Maintenance   Topic Date Due    Lipid screen  04/24/2004    Flu vaccine (1) 09/01/2020    DTaP/Tdap/Td vaccine (1 - Tdap) 02/17/2021 (Originally 4/24/1983)    Shingles Vaccine (1 of 2) 02/17/2021 (Originally 4/24/2014)    HIV screen  02/17/2021 (Originally 4/24/1979)    Hepatitis A vaccine (1 of 2 - Risk 2-dose series) 03/09/2021 (Originally 4/24/1965)    Hepatitis B vaccine (1 of 3 - Risk 3-dose series) 03/09/2021 (Originally 4/24/1983)    Pneumococcal 0-64 years Vaccine (1 of 1 - PPSV23) 03/09/2021 (Originally 4/24/1970)    Colon Cancer Screen FIT/FOBT  03/13/2021    Potassium monitoring  03/23/2021    Creatinine monitoring  03/23/2021    Low dose CT lung screening  06/22/2021    Hepatitis C screen  Completed    Hib vaccine  Aged Out    Meningococcal (ACWY) vaccine  Aged Out             (applicable per patient's age: Cancer Screenings, Depression Screening, Fall Risk Screening, Immunizations)    AST (U/L)   Date Value   09/04/2020 25     ALT (U/L)   Date Value   09/04/2020 32     BUN (mg/dL)   Date Value   03/23/2020 25 (H)      (goal A1C is < 7)   (goal LDL is <100) need 30-50% reduction from baseline     BP Readings from Last 3 Encounters:   09/01/20 136/80   08/24/20 (!) 136/94   05/19/20 132/64    (goal /80)      All Future Testing planned in [facility]:  Lab Frequency Next Occurrence   CBC Auto Differential Once 11/19/2020   Comprehensive Metabolic Panel Once 57/64/9049   Lipid Panel Once 11/19/2020   ESOPHAGOSCOPY / EGD Once 09/01/2020       Next Visit Date:  Future Appointments   Date Time Provider Vinayak Cornell

## 2020-09-22 ENCOUNTER — TELEPHONE (OUTPATIENT)
Dept: GASTROENTEROLOGY | Age: 55
End: 2020-09-22

## 2020-09-22 NOTE — TELEPHONE ENCOUNTER
1st attempt-Left message for patient to call the office to schedule new patient appointment     2nd attempt-mailed letter to patient's home

## 2020-10-26 ENCOUNTER — TELEPHONE (OUTPATIENT)
Dept: PRIMARY CARE CLINIC | Age: 55
End: 2020-10-26

## 2020-10-27 ENCOUNTER — NURSE ONLY (OUTPATIENT)
Dept: PRIMARY CARE CLINIC | Age: 55
End: 2020-10-27

## 2020-10-27 ENCOUNTER — TELEPHONE (OUTPATIENT)
Dept: PRIMARY CARE CLINIC | Age: 55
End: 2020-10-27

## 2020-10-27 VITALS
DIASTOLIC BLOOD PRESSURE: 72 MMHG | WEIGHT: 310 LBS | RESPIRATION RATE: 18 BRPM | BODY MASS INDEX: 43.4 KG/M2 | SYSTOLIC BLOOD PRESSURE: 136 MMHG | HEIGHT: 71 IN | OXYGEN SATURATION: 92 % | HEART RATE: 99 BPM

## 2020-10-27 RX ORDER — METOPROLOL SUCCINATE 25 MG/1
12.5 TABLET, EXTENDED RELEASE ORAL DAILY
Qty: 45 TABLET | Refills: 1 | Status: SHIPPED | OUTPATIENT
Start: 2020-10-27 | End: 2021-01-07 | Stop reason: ALTCHOICE

## 2020-10-27 NOTE — PATIENT INSTRUCTIONS
SURVEY:    You may be receiving a survey from [employer] regarding your visit today. Please complete the survey to enable us to provide the highest quality of care to you and your family. If you cannot score us a very good on any question, please call the office to discuss how we could have made your experience a very good one. Thank you.

## 2020-10-27 NOTE — TELEPHONE ENCOUNTER
Pt states he has been having more frequent headaches. Pt states he has a history of a head injury. Health Maintenance   Topic Date Due    Lipid screen  04/24/2004    Flu vaccine (1) 09/01/2020    DTaP/Tdap/Td vaccine (1 - Tdap) 02/17/2021 (Originally 4/24/1983)    Shingles Vaccine (1 of 2) 02/17/2021 (Originally 4/24/2014)    HIV screen  02/17/2021 (Originally 4/24/1979)    Hepatitis A vaccine (1 of 2 - Risk 2-dose series) 03/09/2021 (Originally 4/24/1965)    Hepatitis B vaccine (1 of 3 - Risk 3-dose series) 03/09/2021 (Originally 4/24/1983)    Pneumococcal 0-64 years Vaccine (1 of 1 - PPSV23) 03/09/2021 (Originally 4/24/1970)    Colon Cancer Screen FIT/FOBT  03/13/2021    Potassium monitoring  03/23/2021    Creatinine monitoring  03/23/2021    Low dose CT lung screening  06/22/2021    Hepatitis C screen  Completed    Hib vaccine  Aged Out    Meningococcal (ACWY) vaccine  Aged Out             (applicable per patient's age: Cancer Screenings, Depression Screening, Fall Risk Screening, Immunizations)    AST (U/L)   Date Value   09/04/2020 25     ALT (U/L)   Date Value   09/04/2020 32     BUN (mg/dL)   Date Value   03/23/2020 25 (H)      (goal A1C is < 7)   (goal LDL is <100) need 30-50% reduction from baseline     BP Readings from Last 3 Encounters:   10/27/20 136/72   09/01/20 136/80   08/24/20 (!) 136/94    (goal /80)      All Future Testing planned in [facility]:  Lab Frequency Next Occurrence   CBC Auto Differential Once 11/19/2020   Comprehensive Metabolic Panel Once 86/56/0656   Lipid Panel Once 11/19/2020   ESOPHAGOSCOPY / EGD Once 09/01/2020       Next Visit Date:  Future Appointments   Date Time Provider Vinayak Cornell   11/9/2020  1:00 PM Frida Do MD [facility]   11/19/2020 11:20 AM Artist Janel Segundo, APRN - CNP [facility]   3/1/2021  1:15 PM Richard Welch MD [facility]            There is no problem list on file for this patient.

## 2020-11-04 RX ORDER — ALBUTEROL SULFATE 2.5 MG/3ML
SOLUTION RESPIRATORY (INHALATION)
Qty: 300 ML | Refills: 1 | Status: SHIPPED | OUTPATIENT
Start: 2020-11-04 | End: 2021-02-22 | Stop reason: SDUPTHER

## 2020-11-04 NOTE — TELEPHONE ENCOUNTER
Pending medication. Health Maintenance   Topic Date Due    Lipid screen  04/24/2004    Flu vaccine (1) 09/01/2020    DTaP/Tdap/Td vaccine (1 - Tdap) 02/17/2021 (Originally 4/24/1983)    Shingles Vaccine (1 of 2) 02/17/2021 (Originally 4/24/2014)    HIV screen  02/17/2021 (Originally 4/24/1979)    Hepatitis A vaccine (1 of 2 - Risk 2-dose series) 03/09/2021 (Originally 4/24/1965)    Hepatitis B vaccine (1 of 3 - Risk 3-dose series) 03/09/2021 (Originally 4/24/1983)    Pneumococcal 0-64 years Vaccine (1 of 1 - PPSV23) 03/09/2021 (Originally 4/24/1970)    Colon Cancer Screen FIT/FOBT  03/13/2021    Potassium monitoring  03/23/2021    Creatinine monitoring  03/23/2021    Low dose CT lung screening  06/22/2021    Hepatitis C screen  Completed    Hib vaccine  Aged Out    Meningococcal (ACWY) vaccine  Aged Out             (applicable per patient's age: Cancer Screenings, Depression Screening, Fall Risk Screening, Immunizations)    AST (U/L)   Date Value   09/04/2020 25     ALT (U/L)   Date Value   09/04/2020 32     BUN (mg/dL)   Date Value   03/23/2020 25 (H)      (goal A1C is < 7)   (goal LDL is <100) need 30-50% reduction from baseline     BP Readings from Last 3 Encounters:   10/27/20 136/72   09/01/20 136/80   08/24/20 (!) 136/94    (goal /80)      All Future Testing planned in [facility]:  Lab Frequency Next Occurrence   CBC Auto Differential Once 11/19/2020   Comprehensive Metabolic Panel Once 48/98/0047   Lipid Panel Once 11/19/2020   ESOPHAGOSCOPY / EGD Once 09/01/2020   CT HEAD WO CONTRAST Once 10/27/2020       Next Visit Date:  Future Appointments   Date Time Provider Vinayak Cornell   11/9/2020  1:00 PM Riccardo Bhardwaj MD [facility]   11/12/2020 10:45 AM [facility] CAT SCAN ROOM [facility]   11/19/2020 11:20 AM Cheryle Huh Might, APRN - CNP [facility]   3/1/2021  1:15 PM Gregory Alberto MD [facility]            There is no problem list on file for this patient.

## 2020-11-27 ENCOUNTER — TELEPHONE (OUTPATIENT)
Dept: PRIMARY CARE CLINIC | Age: 55
End: 2020-11-27

## 2020-12-01 RX ORDER — ZOLPIDEM TARTRATE 10 MG/1
10 TABLET ORAL NIGHTLY PRN
Qty: 30 TABLET | Refills: 2 | Status: SHIPPED | OUTPATIENT
Start: 2020-12-01 | End: 2021-03-15 | Stop reason: SDUPTHER

## 2020-12-01 NOTE — TELEPHONE ENCOUNTER
Controlled Substance Monitoring:    Acute and Chronic Pain Monitoring:   RX Monitoring 12/1/2020   Periodic Controlled Substance Monitoring No signs of potential drug abuse or diversion identified.

## 2020-12-01 NOTE — TELEPHONE ENCOUNTER
Phone call from patient requesting a refill of Ambien.      Health Maintenance   Topic Date Due    Lipid screen  04/24/2004    Flu vaccine (1) 09/01/2020    DTaP/Tdap/Td vaccine (1 - Tdap) 02/17/2021 (Originally 4/24/1983)    Shingles Vaccine (1 of 2) 02/17/2021 (Originally 4/24/2014)    HIV screen  02/17/2021 (Originally 4/24/1979)    Hepatitis A vaccine (1 of 2 - Risk 2-dose series) 03/09/2021 (Originally 4/24/1965)    Hepatitis B vaccine (1 of 3 - Risk 3-dose series) 03/09/2021 (Originally 4/24/1983)    Pneumococcal 0-64 years Vaccine (1 of 1 - PPSV23) 03/09/2021 (Originally 4/24/1970)    Colon Cancer Screen FIT/FOBT  03/13/2021    Potassium monitoring  03/23/2021    Creatinine monitoring  03/23/2021    Low dose CT lung screening  06/22/2021    Hepatitis C screen  Completed    Hib vaccine  Aged Out    Meningococcal (ACWY) vaccine  Aged Out             (applicable per patient's age: Cancer Screenings, Depression Screening, Fall Risk Screening, Immunizations)    AST (U/L)   Date Value   09/04/2020 25     ALT (U/L)   Date Value   09/04/2020 32     BUN (mg/dL)   Date Value   03/23/2020 25 (H)      (goal A1C is < 7)   (goal LDL is <100) need 30-50% reduction from baseline     BP Readings from Last 3 Encounters:   10/27/20 136/72   09/01/20 136/80   08/24/20 (!) 136/94    (goal /80)      All Future Testing planned in [facility]:  Lab Frequency Next Occurrence   CBC Auto Differential Once 12/01/2020   Comprehensive Metabolic Panel Once 69/48/4708   Lipid Panel Once 12/01/2020   ESOPHAGOSCOPY / EGD Once 09/01/2020   CT HEAD WO CONTRAST Once 12/01/2020       Next Visit Date:  Future Appointments   Date Time Provider Vinayak Cornell   12/7/2020 10:40 AM Bj Segundo APRN - CNP [facility]   3/1/2021  1:15 PM Kaylie Sheridan MD [facility]            Patient Active Problem List:     Liver disease

## 2020-12-07 ENCOUNTER — OFFICE VISIT (OUTPATIENT)
Dept: PRIMARY CARE CLINIC | Age: 55
End: 2020-12-07
Payer: MEDICAID

## 2020-12-07 VITALS
SYSTOLIC BLOOD PRESSURE: 138 MMHG | TEMPERATURE: 98 F | HEART RATE: 85 BPM | BODY MASS INDEX: 43.19 KG/M2 | RESPIRATION RATE: 24 BRPM | OXYGEN SATURATION: 92 % | DIASTOLIC BLOOD PRESSURE: 86 MMHG | WEIGHT: 309.7 LBS

## 2020-12-07 PROCEDURE — 90471 IMMUNIZATION ADMIN: CPT | Performed by: NURSE PRACTITIONER

## 2020-12-07 PROCEDURE — G8482 FLU IMMUNIZE ORDER/ADMIN: HCPCS | Performed by: NURSE PRACTITIONER

## 2020-12-07 PROCEDURE — 4004F PT TOBACCO SCREEN RCVD TLK: CPT | Performed by: NURSE PRACTITIONER

## 2020-12-07 PROCEDURE — G8427 DOCREV CUR MEDS BY ELIG CLIN: HCPCS | Performed by: NURSE PRACTITIONER

## 2020-12-07 PROCEDURE — G8417 CALC BMI ABV UP PARAM F/U: HCPCS | Performed by: NURSE PRACTITIONER

## 2020-12-07 PROCEDURE — 90688 IIV4 VACCINE SPLT 0.5 ML IM: CPT | Performed by: NURSE PRACTITIONER

## 2020-12-07 PROCEDURE — 99214 OFFICE O/P EST MOD 30 MIN: CPT | Performed by: NURSE PRACTITIONER

## 2020-12-07 PROCEDURE — 3023F SPIROM DOC REV: CPT | Performed by: NURSE PRACTITIONER

## 2020-12-07 PROCEDURE — G8926 SPIRO NO PERF OR DOC: HCPCS | Performed by: NURSE PRACTITIONER

## 2020-12-07 PROCEDURE — 3017F COLORECTAL CA SCREEN DOC REV: CPT | Performed by: NURSE PRACTITIONER

## 2020-12-07 RX ORDER — ZOSTER VACCINE RECOMBINANT, ADJUVANTED 50 MCG/0.5
0.5 KIT INTRAMUSCULAR SEE ADMIN INSTRUCTIONS
Qty: 0.5 ML | Refills: 0 | Status: SHIPPED | OUTPATIENT
Start: 2020-12-07 | End: 2021-06-05

## 2020-12-07 ASSESSMENT — ENCOUNTER SYMPTOMS
FREQUENT THROAT CLEARING: 0
HEMOPTYSIS: 0
SPUTUM PRODUCTION: 1
SORE THROAT: 0
NAUSEA: 0
DIFFICULTY BREATHING: 0
CONSTIPATION: 0
DIARRHEA: 0
COUGH: 1
ABDOMINAL PAIN: 0
SHORTNESS OF BREATH: 1
TROUBLE SWALLOWING: 0
RHINORRHEA: 0
WHEEZING: 0
BLURRED VISION: 0
HOARSE VOICE: 0
CHEST TIGHTNESS: 0
HEARTBURN: 0
VOMITING: 0

## 2020-12-07 ASSESSMENT — COPD QUESTIONNAIRES: COPD: 1

## 2020-12-07 NOTE — PROGRESS NOTES
After obtaining consent, and per orders of Guerrero Segundo CNP, injection of Influenza vaccine given in Right deltoid by Octavio Mancuso. Patient instructed to remain in clinic for 20 minutes afterwards, and to report any adverse reaction to me immediately. Vaccine Information Sheet, \"Influenza - Inactivated\"  given to Peter Fermin, or parent/legal guardian of  Peter Fermin and verbalized understanding. Patient responses:    Have you ever had a reaction to a flu vaccine? No  Are you able to eat eggs without adverse effects? Yes  Do you have any current illness? No  Have you ever had Guillian Herrick Syndrome? No    Flu vaccine given per order. Please see immunization tab.

## 2020-12-07 NOTE — PROGRESS NOTES
congestion, ear pain, fever, headaches, heartburn, malaise/fatigue, myalgias, nasal congestion, rhinorrhea, sneezing, sore throat, sweats, trouble swallowing or weight loss. His symptoms are aggravated by minimal activity, strenuous activity, URI, change in weather and climbing stairs. His symptoms are alleviated by rest, steroid inhaler and beta-agonist. He reports significant improvement on treatment. His symptoms are not alleviated by rest. His past medical history is significant for asthma, bronchitis, COPD and pneumonia. Past Medical History:     Past Medical History:   Diagnosis Date    COPD (chronic obstructive pulmonary disease) ([facility] [address]     Hypertension     Liver disease       Reviewed all health maintenance requirements and ordered appropriate tests  Health Maintenance Due   Topic Date Due    Lipid screen  04/24/2004       Past Surgical History:     Past Surgical History:   Procedure Laterality Date    ANKLE SURGERY      CARPAL TUNNEL RELEASE Bilateral     HERNIA REPAIR      KNEE ARTHROSCOPY      x's 5    NECK SURGERY          Medications:       Prior to Admission medications    Medication Sig Start Date End Date Taking? Authorizing Provider   zoster recombinant adjuvanted vaccine [facility]) 50 MCG/0.5ML SUSR injection Inject 0.5 mLs into the muscle See Admin Instructions 1 dose now and repeat in 2-6 months 12/7/20 6/5/21 Yes ADRIAN Holland CNP   zolpidem (AMBIEN) 10 MG tablet Take 1 tablet by mouth nightly as needed for Sleep for up to 90 days.  12/1/20 3/1/21 Yes ADRIAN Holland CNP   albuterol (PROVENTIL) (2.5 MG/3ML) 0.083% nebulizer solution USE 1 VIAL IN NEBULIZER EVERY 6 HOURS AS NEEDED FOR WHEEZING 11/4/20  Yes ADRIAN Holland CNP   buPROPion (WELLBUTRIN XL) 150 MG extended release tablet TAKE 1 TABLET BY MOUTH ONCE DAILY IN THE MORNING 9/11/20  Yes ADRIAN Holland CNP   SYMBICORT 160-4.5 MCG/ACT AERO Inhale 2 puffs by mouth twice daily  Patient taking differently: 1 puff 3 times daily  8/20/20  Yes Kimmie Ambrosia Might APRN - CNP   furosemide (LASIX) 40 MG tablet Take 1 tablet by mouth daily 3/30/20  Yes Kimmie Ambrosia Might, APRN - CNP   nicotine (NICODERM CQ) 14 MG/24HR Place 1 patch onto the skin every 24 hours 3/30/20 3/30/21 Yes Kimmie Ambrosia Might, APRN - CNP   ammonium lactate (AMLACTIN) 12 % cream  2/17/20  Yes Historical Provider, MD   Colchicine (COLCRYS PO) Take 1 tablet by mouth 2 times daily    Yes Historical Provider, MD   oxymetazoline (AFRIN) 0.05 % nasal spray 2 sprays by Nasal route 2 times daily   Yes Historical Provider, MD   triamterene-hydrochlorothiazide (MAXZIDE-25) 37.5-25 MG per tablet Take 1 tablet by mouth daily 2/17/20  Yes Kimmie Segundo APRN - CNP   metoprolol succinate (TOPROL XL) 25 MG extended release tablet Take 0.5 tablets by mouth daily  Patient not taking: Reported on 12/7/2020 10/27/20   Kimmie Segundo APRN - CNP        Allergies:       Pcn [penicillins]    Social History:     Tobacco:    reports that he has been smoking cigarettes. He has a 21.00 pack-year smoking history. He has never used smokeless tobacco.  Alcohol:      reports previous alcohol use. Drug Use:  reports current drug use. Drug: Marijuana. Family History:     Family History   Problem Relation Age of Onset    Allergies Mother     Diabetes Father     Esophageal Cancer Father        Review of Systems:     Positive and Negative as described in HPI    Review of Systems   Constitutional: Negative for appetite change, chills, fatigue, fever, malaise/fatigue and weight loss. HENT: Negative for congestion, ear pain, hoarse voice, rhinorrhea, sneezing, sore throat and trouble swallowing. Eyes: Negative for blurred vision and visual disturbance. Respiratory: Positive for cough, sputum production and shortness of breath. Negative for hemoptysis and wheezing. Cardiovascular: Positive for dyspnea on exertion and leg swelling. Negative for chest pain and palpitations. Gastrointestinal: Negative for abdominal pain, constipation, diarrhea, heartburn, nausea and vomiting. Genitourinary: Negative for difficulty urinating and dysuria. Musculoskeletal: Negative for gait problem, myalgias, neck pain and neck stiffness. Skin: Positive for wound (Lesion arm). Negative for rash. Neurological: Negative for dizziness, syncope, light-headedness and headaches. Physical Exam:   Vitals:  /86 (Site: Left Upper Arm, Position: Sitting, Cuff Size: Large Adult)   Pulse 85   Temp 98 °F (36.7 °C) (Temporal)   Resp 24   Wt (!) 309 lb 11.2 oz (140.5 kg)   SpO2 92%   BMI 43.19 kg/m²     Physical Exam  Constitutional:       Appearance: Normal appearance. HENT:      Mouth/Throat:      Mouth: Mucous membranes are moist.   Eyes:      Conjunctiva/sclera: Conjunctivae normal.   Neck:      Musculoskeletal: Normal range of motion and neck supple. Cardiovascular:      Rate and Rhythm: Normal rate and regular rhythm. Heart sounds: No murmur. Pulmonary:      Effort: Pulmonary effort is normal.      Breath sounds: Decreased breath sounds (bilat bases) present. No wheezing. Abdominal:      General: Bowel sounds are normal. There is no distension. Palpations: Abdomen is soft. Tenderness: There is no abdominal tenderness. Musculoskeletal:      Right lower leg: Edema (Trace pitting) present. Left lower leg: Edema (Trace pitting) present. Skin:     General: Skin is warm and dry. Findings: Lesion present. Neurological:      Mental Status: He is alert and oriented to person, place, and time.    Psychiatric:         Mood and Affect: Mood normal.         Behavior: Behavior normal.         Data:     Lab Results   Component Value Date     03/23/2020    K 4.9 03/23/2020    CL 94 03/23/2020    CO2 29 03/23/2020    BUN 25 03/23/2020    CREATININE 0.93 03/23/2020    GLUCOSE 90 03/23/2020    PROT 7.6 09/04/2020    LABALBU 4.4 09/04/2020    BILITOT 0.23 09/04/2020    ALKPHOS 68 09/04/2020    AST 25 09/04/2020    ALT 32 09/04/2020     Lab Results   Component Value Date    WBC 7.8 03/06/2020    RBC 5.62 03/06/2020    HGB 16.6 03/06/2020    HCT 56.9 03/06/2020    .2 03/06/2020    MCH 29.5 03/06/2020    MCHC 29.2 03/06/2020    RDW 18.7 03/06/2020     03/06/2020    MPV 11.1 03/06/2020     No results found for: TSH  No results found for: CHOL, HDL, PSA, LABA1C    Assessment/Plan:      Diagnosis Orders   1. Essential hypertension  CBC Auto Differential    ALT    AST    Basic Metabolic Panel    Lipid Panel   2. Chronic obstructive pulmonary disease, unspecified COPD type ([facility] [address]     3. Arm lesion  [facility], [place], DO, General Surgery, [place]   4. Need for shingles vaccine  zoster recombinant adjuvanted vaccine [facility]) 50 MCG/0.5ML SUSR injection   5. Influenza vaccine needed  INFLUENZA, QUADV, 3 YRS AND OLDER, IM, MDV, 0.5ML (Jasper Gonzales)       1.  Gabino Leonardo received counseling on the following healthy behaviors: nutrition, exercise and medication adherence  2. Patient given educational materials - see patient instructions  3. Was a self-tracking handout given in paper form or via [employer]? No  If yes, see orders or list here. 4.  Discussed use, benefit, and side effects of prescribed medications. Barriers to medication compliance addressed. All patient questions answered. Pt voiced understanding. 5.  Reviewed prior labs and health maintenance  6. Continue current medications, diet and exercise. Completed Refills   Requested Prescriptions     Signed Prescriptions Disp Refills    zoster recombinant adjuvanted vaccine (SHINGRIX) 50 MCG/0.5ML SUSR injection 0.5 mL 0     Sig: Inject 0.5 mLs into the muscle See Admin Instructions 1 dose now and repeat in 2-6 months         Return in about 6 months (around 6/7/2021) for Check up.

## 2020-12-07 NOTE — PATIENT INSTRUCTIONS
SURVEY:    You may be receiving a survey from [employer] regarding your visit today. Please complete the survey to enable us to provide the highest quality of care to you and your family. If you cannot score us a very good on any question, please call the office to discuss how we could have made your experience a very good one. Thank you.

## 2020-12-18 ENCOUNTER — TELEPHONE (OUTPATIENT)
Dept: PRIMARY CARE CLINIC | Age: 55
End: 2020-12-18

## 2020-12-18 RX ORDER — NEBULIZER ACCESSORIES
1 KIT MISCELLANEOUS DAILY PRN
Qty: 1 KIT | Refills: 0 | Status: SHIPPED | OUTPATIENT
Start: 2020-12-18

## 2020-12-18 NOTE — TELEPHONE ENCOUNTER
Pt. Calls requesting RX for Nebulizer machine w/supplies to [employer].   Health Maintenance   Topic Date Due    Lipid screen  04/24/2004    DTaP/Tdap/Td vaccine (1 - Tdap) 02/17/2021 (Originally 4/24/1983)    Shingles Vaccine (1 of 2) 02/17/2021 (Originally 4/24/2014)    HIV screen  02/17/2021 (Originally 4/24/1979)    Hepatitis A vaccine (1 of 2 - Risk 2-dose series) 03/09/2021 (Originally 4/24/1965)    Hepatitis B vaccine (1 of 3 - Risk 3-dose series) 03/09/2021 (Originally 4/24/1983)    Pneumococcal 0-64 years Vaccine (1 of 1 - PPSV23) 03/09/2021 (Originally 4/24/1970)    Colon Cancer Screen FIT/FOBT  03/13/2021    Potassium monitoring  03/23/2021    Creatinine monitoring  03/23/2021    Flu vaccine  Completed    Hepatitis C screen  Completed    Hib vaccine  Aged Out    Meningococcal (ACWY) vaccine  Aged Out             (applicable per patient's age: Cancer Screenings, Depression Screening, Fall Risk Screening, Immunizations)    AST (U/L)   Date Value   09/04/2020 25     ALT (U/L)   Date Value   09/04/2020 32     BUN (mg/dL)   Date Value   03/23/2020 25 (H)      (goal A1C is < 7)   (goal LDL is <100) need 30-50% reduction from baseline     BP Readings from Last 3 Encounters:   12/07/20 138/86   10/27/20 136/72   09/01/20 136/80    (goal /80)      All Future Testing planned in [facility]:  Lab Frequency Next Occurrence   ESOPHAGOSCOPY / EGD Once 09/01/2020   CT HEAD WO CONTRAST Once 12/23/2020   CBC Auto Differential Once 06/05/2021   ALT Once 06/07/2021   AST Once 32/62/7147   Basic Metabolic Panel Once 68/21/4935   Lipid Panel Once 06/05/2021       Next Visit Date:  Future Appointments   Date Time Provider Vinayak Cornell   3/1/2021  1:15 PM Kevon Harden MD [facility]   6/9/2021 11:00 AM Delmy Segundo, APRN - CNP [facility]            Patient Active Problem List:     Liver disease

## 2020-12-28 RX ORDER — TRIAMTERENE AND HYDROCHLOROTHIAZIDE 37.5; 25 MG/1; MG/1
1 TABLET ORAL DAILY
Qty: 90 TABLET | Refills: 3 | Status: SHIPPED | OUTPATIENT
Start: 2020-12-28

## 2020-12-28 RX ORDER — TRIAMTERENE AND HYDROCHLOROTHIAZIDE 37.5; 25 MG/1; MG/1
TABLET ORAL
Qty: 90 TABLET | Refills: 0 | OUTPATIENT
Start: 2020-12-28

## 2021-01-07 ENCOUNTER — TELEPHONE (OUTPATIENT)
Dept: PRIMARY CARE CLINIC | Age: 56
End: 2021-01-07

## 2021-01-07 DIAGNOSIS — I10 ESSENTIAL HYPERTENSION: Primary | ICD-10-CM

## 2021-01-07 RX ORDER — CARVEDILOL 3.12 MG/1
3.12 TABLET ORAL 2 TIMES DAILY
Qty: 180 TABLET | Refills: 1 | Status: SHIPPED | OUTPATIENT
Start: 2021-01-07 | End: 2021-06-09 | Stop reason: SDUPTHER

## 2021-01-07 NOTE — TELEPHONE ENCOUNTER
Health Maintenance   Topic Date Due    Lipid screen  04/24/2004    DTaP/Tdap/Td vaccine (1 - Tdap) 02/17/2021 (Originally 4/24/1983)    Shingles Vaccine (1 of 2) 02/17/2021 (Originally 4/24/2014)    HIV screen  02/17/2021 (Originally 4/24/1979)    Hepatitis A vaccine (1 of 2 - Risk 2-dose series) 03/09/2021 (Originally 4/24/1965)    Hepatitis B vaccine (1 of 3 - Risk 3-dose series) 03/09/2021 (Originally 4/24/1983)    Pneumococcal 0-64 years Vaccine (1 of 1 - PPSV23) 03/09/2021 (Originally 4/24/1970)    Colon Cancer Screen FIT/FOBT  03/13/2021    Potassium monitoring  03/23/2021    Creatinine monitoring  03/23/2021    Flu vaccine  Completed    Hepatitis C screen  Completed    Hib vaccine  Aged Out    Meningococcal (ACWY) vaccine  Aged Out             (applicable per patient's age: Cancer Screenings, Depression Screening, Fall Risk Screening, Immunizations)    AST (U/L)   Date Value   09/04/2020 25     ALT (U/L)   Date Value   09/04/2020 32     BUN (mg/dL)   Date Value   03/23/2020 25 (H)      (goal A1C is < 7)   (goal LDL is <100) need 30-50% reduction from baseline     BP Readings from Last 3 Encounters:   12/07/20 138/86   10/27/20 136/72   09/01/20 136/80    (goal /80)      All Future Testing planned in [facility]:  Lab Frequency Next Occurrence   ESOPHAGOSCOPY / EGD Once 09/01/2020   CBC Auto Differential Once 06/05/2021   ALT Once 06/07/2021   AST Once 44/26/9301   Basic Metabolic Panel Once 21/94/9736   Lipid Panel Once 06/05/2021       Next Visit Date:  Future Appointments   Date Time Provider Vinayak Cornell   1/13/2021  3:30 PM Wilkie Heimlich, DO [facility]   3/1/2021  1:15 PM Mayela Vazquez MD [facility]   6/9/2021 11:00 AM Wellington Segundo, APRN - CNP [facility]            Patient Active Problem List:     Liver disease

## 2021-01-07 NOTE — TELEPHONE ENCOUNTER
Pt. Reports he previously been taking Carvedilol 6.25 mg tabs, BID. He stopped taking BID d/t his BP dropping too ;low. He has continued to take QD, he is asking if you can send new RX to [facility] for him as he is now out, thank you.

## 2021-02-10 ENCOUNTER — TELEPHONE (OUTPATIENT)
Dept: PRIMARY CARE CLINIC | Age: 56
End: 2021-02-10

## 2021-02-10 NOTE — TELEPHONE ENCOUNTER
Received letter from general surgery office that they have tried to contact patient x 3 to schedule referral appt. Called patient and he states that he has been getting a lot of spam calls and has been ignoring them and it could have been one of them calls. Phone number for general surgery office given and pt to call to schedule.

## 2021-02-22 DIAGNOSIS — J44.9 CHRONIC OBSTRUCTIVE PULMONARY DISEASE, UNSPECIFIED COPD TYPE (HCC): ICD-10-CM

## 2021-02-22 RX ORDER — BUDESONIDE AND FORMOTEROL FUMARATE DIHYDRATE 160; 4.5 UG/1; UG/1
AEROSOL RESPIRATORY (INHALATION)
Qty: 11 G | Refills: 5 | Status: SHIPPED | OUTPATIENT
Start: 2021-02-22 | End: 2021-08-20

## 2021-02-22 RX ORDER — ALBUTEROL SULFATE 2.5 MG/3ML
SOLUTION RESPIRATORY (INHALATION)
Qty: 300 ML | Refills: 1 | Status: SHIPPED | OUTPATIENT
Start: 2021-02-22 | End: 2022-02-23

## 2021-02-22 NOTE — TELEPHONE ENCOUNTER
Phone call from patient requesting refills.     Health Maintenance   Topic Date Due    HIV screen  04/24/1979    DTaP/Tdap/Td vaccine (1 - Tdap) 04/24/1983    Lipid screen  04/24/2004    Shingles Vaccine (1 of 2) 04/24/2014    Colon Cancer Screen FIT/FOBT  03/13/2021    Hepatitis A vaccine (1 of 2 - Risk 2-dose series) 03/09/2021 (Originally 4/24/1966)    Hepatitis B vaccine (1 of 3 - Risk 3-dose series) 03/09/2021 (Originally 4/24/1983)    Pneumococcal 0-64 years Vaccine (1 of 1 - PPSV23) 03/09/2021 (Originally [birth year]/24/1970)    Potassium monitoring  03/23/2021    Creatinine monitoring  03/23/2021    Flu vaccine  Completed    Hepatitis C screen  Completed    Hib vaccine  Aged Out    Meningococcal (ACWY) vaccine  Aged Out             (applicable per patient's age: Cancer Screenings, Depression Screening, Fall Risk Screening, Immunizations)    AST (U/L)   Date Value   09/04/2020 25     ALT (U/L)   Date Value   09/04/2020 32     BUN (mg/dL)   Date Value   03/23/2020 25 (H)      (goal A1C is < 7)   (goal LDL is <100) need 30-50% reduction from baseline     BP Readings from Last 3 Encounters:   12/07/20 138/86   10/27/20 136/72   09/01/20 136/80    (goal /80)      All Future Testing planned in [facility]:  Lab Frequency Next Occurrence   ESOPHAGOSCOPY / EGD Once 09/01/2020   CBC Auto Differential Once 06/05/2021   ALT Once 06/07/2021   AST Once 13/30/8834   Basic Metabolic Panel Once 17/15/1243   Lipid Panel Once 06/05/2021       Next Visit Date:  Future Appointments   Date Time Provider Vinayak Cornell   3/1/2021  1:15 PM Latasha Vegas MD [facility]   6/9/2021 11:00 AM ADRIAN Hollingsworth - CNP [facility]            Patient Active Problem List:     Liver disease

## 2021-03-15 DIAGNOSIS — F51.01 PRIMARY INSOMNIA: ICD-10-CM

## 2021-03-15 RX ORDER — ZOLPIDEM TARTRATE 10 MG/1
10 TABLET ORAL NIGHTLY PRN
Qty: 30 TABLET | Refills: 2 | Status: SHIPPED | OUTPATIENT
Start: 2021-03-15 | End: 2021-06-23

## 2021-03-15 NOTE — TELEPHONE ENCOUNTER
Phone call from patient requesting a refill of Ambien.     Health Maintenance   Topic Date Due    Hepatitis A vaccine (1 of 2 - Risk 2-dose series) Never done    Pneumococcal 0-64 years Vaccine (1 of 1 - PPSV23) Never done    HIV screen  Never done    COVID-19 Vaccine (1) Never done    Hepatitis B vaccine (1 of 3 - Risk 3-dose series) Never done    DTaP/Tdap/Td vaccine (1 - Tdap) Never done    Lipid screen  Never done    Shingles Vaccine (1 of 2) Never done    Colon Cancer Screen FIT/FOBT  03/13/2021    Potassium monitoring  03/23/2021    Creatinine monitoring  03/23/2021    Flu vaccine  Completed    Hepatitis C screen  Completed    Hib vaccine  Aged Out    Meningococcal (ACWY) vaccine  Aged Out             (applicable per patient's age: Cancer Screenings, Depression Screening, Fall Risk Screening, Immunizations)    AST (U/L)   Date Value   09/04/2020 25     ALT (U/L)   Date Value   09/04/2020 32     BUN (mg/dL)   Date Value   03/23/2020 25 (H)      (goal A1C is < 7)   (goal LDL is <100) need 30-50% reduction from baseline     BP Readings from Last 3 Encounters:   12/07/20 138/86   10/27/20 136/72   09/01/20 136/80    (goal /80)      All Future Testing planned in [facility]:  Lab Frequency Next Occurrence   ESOPHAGOSCOPY / EGD Once 09/01/2020   CBC Auto Differential Once 06/05/2021   ALT Once 06/07/2021   AST Once 03/72/0912   Basic Metabolic Panel Once 36/27/5087   Lipid Panel Once 06/05/2021       Next Visit Date:  Future Appointments   Date Time Provider Vinayak Cornell   3/25/2021  3:00 PM Loni Lai MD [facility]   6/9/2021 11:00 AM Marcial Segundo APRN - CNP [facility]            Patient Active Problem List:     Liver disease

## 2021-03-19 DIAGNOSIS — R60.0 LOWER LEG EDEMA: ICD-10-CM

## 2021-03-19 RX ORDER — FUROSEMIDE 40 MG/1
TABLET ORAL
Qty: 90 TABLET | Refills: 0 | Status: SHIPPED | OUTPATIENT
Start: 2021-03-19 | End: 2021-06-09 | Stop reason: SDUPTHER

## 2021-03-19 NOTE — TELEPHONE ENCOUNTER
Health Maintenance   Topic Date Due    Hepatitis A vaccine (1 of 2 - Risk 2-dose series) Never done    Pneumococcal 0-64 years Vaccine (1 of 1 - PPSV23) Never done    HIV screen  Never done    COVID-19 Vaccine (1) Never done    Hepatitis B vaccine (1 of 3 - Risk 3-dose series) Never done    DTaP/Tdap/Td vaccine (1 - Tdap) Never done    Lipid screen  Never done    Shingles Vaccine (1 of 2) Never done    Colon Cancer Screen FIT/FOBT  03/13/2021    Potassium monitoring  03/23/2021    Creatinine monitoring  03/23/2021    Flu vaccine  Completed    Hepatitis C screen  Completed    Hib vaccine  Aged Out    Meningococcal (ACWY) vaccine  Aged Out             (applicable per patient's age: Cancer Screenings, Depression Screening, Fall Risk Screening, Immunizations)    AST (U/L)   Date Value   09/04/2020 25     ALT (U/L)   Date Value   09/04/2020 32     BUN (mg/dL)   Date Value   03/23/2020 25 (H)      (goal A1C is < 7)   (goal LDL is <100) need 30-50% reduction from baseline     BP Readings from Last 3 Encounters:   12/07/20 138/86   10/27/20 136/72   09/01/20 136/80    (goal /80)      All Future Testing planned in [facility]:  Lab Frequency Next Occurrence   ESOPHAGOSCOPY / EGD Once 09/01/2020   CBC Auto Differential Once 06/05/2021   ALT Once 06/07/2021   AST Once 13/89/1737   Basic Metabolic Panel Once 15/93/3185   Lipid Panel Once 06/05/2021       Next Visit Date:  Future Appointments   Date Time Provider Vinayak Cornell   3/25/2021  3:00 PM Eulalia Raza MD [facility]   6/9/2021 11:00 AM Jennifer Segundo APRN - CNP [facility]            Patient Active Problem List:     Liver disease

## 2021-03-31 ENCOUNTER — TELEPHONE (OUTPATIENT)
Dept: PRIMARY CARE CLINIC | Age: 56
End: 2021-03-31

## 2021-03-31 DIAGNOSIS — R09.89 SYMPTOMS OF UPPER RESPIRATORY INFECTION (URI): Primary | ICD-10-CM

## 2021-03-31 NOTE — TELEPHONE ENCOUNTER
Pt called in stating he believes he has Covid-19 and would like to have a test done. Symptoms began [place] 3/28/21  Coughing, SOB, lightheadedness, weakness, altered taste,dry mouth, no fever, constipation. He has an appt w/ pulmonology tomorrow, I recommended he give their office a call to inform them of his current symptoms.

## 2021-04-01 ENCOUNTER — HOSPITAL ENCOUNTER (OUTPATIENT)
Dept: LAB | Age: 56
Setting detail: SPECIMEN
Discharge: HOME | End: 2021-04-01
Payer: MEDICAID

## 2021-04-01 DIAGNOSIS — R09.89 SYMPTOMS OF UPPER RESPIRATORY INFECTION (URI): ICD-10-CM

## 2021-04-01 PROCEDURE — U0005 INFEC AGEN DETEC AMPLI PROBE: HCPCS

## 2021-04-01 PROCEDURE — U0003 INFECTIOUS AGENT DETECTION BY NUCLEIC ACID (DNA OR RNA); SEVERE ACUTE RESPIRATORY SYNDROME CORONAVIRUS 2 (SARS-COV-2) (CORONAVIRUS DISEASE [COVID-19]), AMPLIFIED PROBE TECHNIQUE, MAKING USE OF HIGH THROUGHPUT TECHNOLOGIES AS DESCRIBED BY CMS-2020-01-R: HCPCS

## 2021-04-01 PROCEDURE — C9803 HOPD COVID-19 SPEC COLLECT: HCPCS

## 2021-04-02 LAB
SARS-COV-2: NORMAL
SARS-COV-2: NOT DETECTED
SOURCE: NORMAL

## 2021-04-03 ENCOUNTER — TELEPHONE (OUTPATIENT)
Dept: PRIMARY CARE CLINIC | Age: 56
End: 2021-04-03

## 2021-05-07 ENCOUNTER — OFFICE VISIT (OUTPATIENT)
Dept: PRIMARY CARE CLINIC | Age: 56
End: 2021-05-07
Payer: MEDICAID

## 2021-05-07 VITALS
RESPIRATION RATE: 18 BRPM | WEIGHT: 285 LBS | HEIGHT: 72 IN | SYSTOLIC BLOOD PRESSURE: 124 MMHG | HEART RATE: 101 BPM | DIASTOLIC BLOOD PRESSURE: 84 MMHG | BODY MASS INDEX: 38.6 KG/M2

## 2021-05-07 DIAGNOSIS — E11.69 TYPE 2 DIABETES MELLITUS WITH OTHER SPECIFIED COMPLICATION, UNSPECIFIED WHETHER LONG TERM INSULIN USE (HCC): Primary | ICD-10-CM

## 2021-05-07 DIAGNOSIS — R73.09 ELEVATED RANDOM BLOOD GLUCOSE LEVEL: ICD-10-CM

## 2021-05-07 DIAGNOSIS — R18.8 CIRRHOSIS OF LIVER WITH ASCITES, UNSPECIFIED HEPATIC CIRRHOSIS TYPE (HCC): ICD-10-CM

## 2021-05-07 DIAGNOSIS — R53.83 FATIGUE, UNSPECIFIED TYPE: ICD-10-CM

## 2021-05-07 DIAGNOSIS — K59.00 CONSTIPATION, UNSPECIFIED CONSTIPATION TYPE: ICD-10-CM

## 2021-05-07 DIAGNOSIS — R63.1 POLYDIPSIA: ICD-10-CM

## 2021-05-07 DIAGNOSIS — K74.60 CIRRHOSIS OF LIVER WITH ASCITES, UNSPECIFIED HEPATIC CIRRHOSIS TYPE (HCC): ICD-10-CM

## 2021-05-07 LAB — HBA1C MFR BLD: 14 %

## 2021-05-07 PROCEDURE — 99214 OFFICE O/P EST MOD 30 MIN: CPT | Performed by: NURSE PRACTITIONER

## 2021-05-07 PROCEDURE — G8417 CALC BMI ABV UP PARAM F/U: HCPCS | Performed by: NURSE PRACTITIONER

## 2021-05-07 PROCEDURE — 3046F HEMOGLOBIN A1C LEVEL >9.0%: CPT | Performed by: NURSE PRACTITIONER

## 2021-05-07 PROCEDURE — 83036 HEMOGLOBIN GLYCOSYLATED A1C: CPT | Performed by: NURSE PRACTITIONER

## 2021-05-07 PROCEDURE — 3017F COLORECTAL CA SCREEN DOC REV: CPT | Performed by: NURSE PRACTITIONER

## 2021-05-07 PROCEDURE — 4004F PT TOBACCO SCREEN RCVD TLK: CPT | Performed by: NURSE PRACTITIONER

## 2021-05-07 PROCEDURE — 2022F DILAT RTA XM EVC RTNOPTHY: CPT | Performed by: NURSE PRACTITIONER

## 2021-05-07 PROCEDURE — G8427 DOCREV CUR MEDS BY ELIG CLIN: HCPCS | Performed by: NURSE PRACTITIONER

## 2021-05-07 RX ORDER — METFORMIN HYDROCHLORIDE 500 MG/1
1000 TABLET, EXTENDED RELEASE ORAL
Qty: 180 TABLET | Refills: 1 | Status: SHIPPED | OUTPATIENT
Start: 2021-05-07 | End: 2021-10-22 | Stop reason: SDUPTHER

## 2021-05-07 RX ORDER — INSULIN GLARGINE 100 [IU]/ML
20 INJECTION, SOLUTION SUBCUTANEOUS NIGHTLY
Qty: 5 PEN | Refills: 0 | Status: SHIPPED | OUTPATIENT
Start: 2021-05-07 | End: 2021-05-21 | Stop reason: DRUGHIGH

## 2021-05-07 RX ORDER — POLYETHYLENE GLYCOL 3350 17 G/17G
17 POWDER, FOR SOLUTION ORAL DAILY
Qty: 1530 G | Refills: 1 | Status: SHIPPED | OUTPATIENT
Start: 2021-05-07 | End: 2021-06-06

## 2021-05-07 ASSESSMENT — ENCOUNTER SYMPTOMS
ABDOMINAL PAIN: 0
ANAL BLEEDING: 0
CONSTIPATION: 1
VOMITING: 0
BLOOD IN STOOL: 0
NAUSEA: 0
DIARRHEA: 0

## 2021-05-07 ASSESSMENT — PATIENT HEALTH QUESTIONNAIRE - PHQ9
SUM OF ALL RESPONSES TO PHQ9 QUESTIONS 1 & 2: 0
SUM OF ALL RESPONSES TO PHQ QUESTIONS 1-9: 0

## 2021-05-07 NOTE — PATIENT INSTRUCTIONS
SURVEY:    You may be receiving a survey from [employer] regarding your visit today. Please complete the survey to enable us to provide the highest quality of care to you and your family. If you cannot score us a very good on any question, please call the office to discuss how we could have made your experience a very good one. Thank you. Maura Segundo, APRN-CNP  Dhiraj Sharma, APRN-CNP  Jes Ndiaye, ANNIE Brown, ANNIE Montano, JULIA Benedict, JULIA Champion, PCA    Patient Education        insulin glargine  Pronunciation:  IN cloud marcus GLAR gine  Brand:  Basaglar KwikPen, Lantus, Lantus Solostar Pen, Toujeo SoloStar  What is the most important information I should know about insulin glargine? Never share an injection pen or syringe with another person, even if the needle has been changed. What is insulin glargine? Insulin glargine is a long-acting insulin that starts to work several hours after injection and keeps working evenly for 24 hours. Insulin glargine is used to improve blood sugar control in people with diabetes mellitus. This medicine is for use in adults with type 1 or type 2 diabetes, and in children at least 10years old with type 1 diabetes. Some brands of this medicine are for use only in adults. Carefully follow all instructions for the brand of insulin glargine you are using. Insulin glargine may also be used for purposes not listed in this medication guide. What should I discuss with my healthcare provider before using insulin glargine? You should not use this medicine if you are allergic to insulin, or if you are having an episode of hypoglycemia (low blood sugar) or diabetic ketoacidosis (call your doctor for treatment). Insulin glargine is not approved for use by anyone younger than 10years old, and some brands are for use only in adults. Do not use this medicine to treat type 2 diabetes in a child of any age.   Tell your doctor if you have ever had:  · liver or kidney disease; or  · heart failure or other heart problems. Tell your doctor if you also take pioglitazone or rosiglitazone (sometimes contained in combinations with glimepiride or metformin). Taking certain oral diabetes medicines while you are using insulin may increase your risk of serious heart problems. Tell your doctor if you are pregnant or breastfeeding. Follow your doctor's instructions about using insulin if you are pregnant or you become pregnant. Controlling diabetes is very important during pregnancy, and having high blood sugar may cause complications in both the mother and the baby. How should I use insulin glargine? Follow all directions on your prescription label and read all medication guides or instruction sheets. Use the medicine exactly as directed. Insulin glargine is injected under the skin. A healthcare provider may teach you how to properly use the medication by yourself. Insulin glargine must not be given with an insulin pump, or mixed with other insulins. Do not inject insulin glargine into a vein or a muscle. Do not inject this medicine into skin that is damaged, tender, bruised, pitted, thickened, scaly, or has a scar or hard lump. Read and carefully follow any Instructions for Use provided with your medicine. Do not use insulin glargine if you don't understand all instructions for proper use. Ask your doctor or pharmacist if you have questions. The Toujeo brand of insulin glargine contains 3 times as much insulin per milliliter (mL) as the Lantus or Basaglar brands. There are 300 units of insulin in 1 mL of Toujeo, and 100 units in 1 mL of Lantus or Basaglar. If there are any changes in the brand, strength, or type of insulin you use, your dosage needs may change. If you use an injection pen, use only the injection pen that comes with insulin glargine. Attach a new needle before each use. Do not transfer the insulin from the pen into a syringe.   Never share an injection pen or syringe with another person, even if the needle has been changed. Sharing these devices can allow infections or disease to pass from one person to another. You may have low blood sugar (hypoglycemia) and feel very hungry, dizzy, irritable, confused, anxious, or shaky. To quickly treat hypoglycemia, eat or drink a fast-acting source of sugar (fruit juice, hard candy, crackers, raisins, or non-diet soda). Your doctor may prescribe a glucagon injection kit in case you have severe hypoglycemia. Be sure your family or close friends know how to give you this injection in an emergency. Also watch for signs of high blood sugar (hyperglycemia) such as increased thirst or urination. Blood sugar levels can be affected by stress, illness, surgery, exercise, alcohol use, or skipping meals. Ask your doctor before changing your dose or medication schedule. Keep this medicine in its original container protected from heat and light. Do not draw insulin from a vial into a syringe until you are ready to give an injection. Do not freeze insulin or store it near the cooling element in a refrigerator. Throw away any insulin that has been frozen. Storing unopened (not in use) Basaglar or Lantus:   · Refrigerate and use until expiration date; or  · Store at room temperature and use within 28 days. Storing unopened (not in use) Toujeo:   · Refrigerate and use until expiration date. Storing opened (in use) Basaglar or Lantus:   · Store the vial in a refrigerator or at room temperature and use within 28 days. · Store the injection pen at room temperature (do not refrigerate) and use within 28 days. Storing opened (in use) Toujeo:   · Store the injection pen at room temperature (do not refrigerate) and use within 42 days. Do not store an injection pen with the needle attached. Do not use the medicine if it looks cloudy, has changed colors, or has any particles in it. Call your pharmacist for new medicine.   Wear a diabetes medical alert tag in case of emergency. Any medical care provider who treats you should know that you have diabetes. Use a needle and syringe only once and then place them in a puncture-proof \"sharps\" container. Follow state or local laws about how to dispose of this container. Keep it out of the reach of children and pets. What happens if I miss a dose? Call your doctor for instructions if you miss a dose of insulin glargine. You should not use more than one dose in a 24-hour period unless your doctor tells you to. Get your prescription refilled before you run out of medicine completely. What happens if I overdose? Seek emergency medical attention or call the Poison Help line at 1-178.771.2650. Insulin overdose can cause life-threatening hypoglycemia. Symptoms include drowsiness, confusion, blurred vision, numbness or tingling in your mouth, trouble speaking, muscle weakness, clumsy or jerky movements, seizure (convulsions), or loss of consciousness. What should I avoid while using insulin glargine? Avoid driving or hazardous activity until you know how this medicine will affect you. Your reactions could be impaired. Avoid medication errors by always checking the medicine label before injecting your insulin. Avoid drinking alcohol. It can cause low blood sugar and may interfere with your diabetes treatment. What are the possible side effects of insulin glargine? Get emergency medical help if you have signs of insulin allergy: redness or swelling where an injection was given, itchy skin rash over the entire body, trouble breathing, fast heartbeats, feeling like you might pass out, or swelling in your tongue or throat. Call your doctor at once if you have:  · rapid weight gain, swelling in your feet or ankles;  · shortness of breath; or  · low potassium --leg cramps, constipation, irregular heartbeats, fluttering in your chest, increased thirst or urination, numbness or tingling, muscle weakness or limp feeling.   Common side effects healthcare practitioners. The absence of a warning for a given drug or drug combination in no way should be construed to indicate that the drug or drug combination is safe, effective or appropriate for any given patient. [facility] does not assume any responsibility for any aspect of healthcare administered with the aid of information [facility] provides. The information contained herein is not intended to cover all possible uses, directions, precautions, warnings, drug interactions, allergic reactions, or adverse effects. If you have questions about the drugs you are taking, check with your doctor, nurse or pharmacist.  Copyright 9051-5856 [address]: 13.02. Revision date: 4/6/2020. Care instructions adapted under license by [facility] ([facility]). If you have questions about a medical condition or this instruction, always ask your healthcare professional. [place] [address] any warranty or liability for your use of this information. Patient Education        How to Give a Subcutaneous Shot: Care Instructions  Overview  A subcutaneous (say \"sub-kyoo-BINA-nee-us\") shot is an injection of medicine under the skin, but not in a muscle. Some medicines, such as insulin or the blood-thinner enoxaparin (Lovenox), are injected only under the skin. This type of shot is usually given in the belly or the thigh. At first, you may be nervous about giving yourself a shot. But soon, giving the shot will become routine. Follow-up care is a key part of your treatment and safety. Be sure to make and go to all appointments, and call your doctor if you are having problems. It's also a good idea to know your test results and keep a list of the medicines you take. How do you give yourself a subcutaneous shot? Follow your health professional's instructions for where and how often to inject your medicine. Your nurse will show you how to give yourself the shot. 1. Gather your equipment.      This includes your syringe (containing medicine) and an alcohol wipe or a cotton ball dipped in alcohol. 2. Wash your hands with soap and running water. Dry them well. 3. Choose a spot on your belly or thigh for the shot. A shot in the belly should be 2 inches away from your belly button. 4. Use alcohol to clean the skin. Let it dry. 5. Remove the cap from the needle. 6. Hold the syringe like a pencil close to the site. Keep your fingers off the plunger. 7. Slightly pinch a fold of skin at the spot you chose. Pinch it between the fingers and thumb of one hand. 8. Place the syringe at a 90-degree angle to the shot site. The needle should stand straight up from the skin. 9. Quickly push the needle all the way into the pinched-up fold of skin. 10. Push the plunger of the syringe all the way in. This allows the medicine to go into the fatty tissue. Be sure to hold the skin fold as you give the shot. This will help make sure that you don't inject the medicine into muscle. 11. Take the needle out at the same angle that you inserted it. 12. Let go of the skin fold. 13. If you bleed a little, apply pressure over the shot area. You can use your finger, a cotton ball, or a piece of gauze. To help avoid bruising, don't rub the area. 14. Dispose of the needle safely. Don't use the same needle more than one time. Slightly change the spot where you give the shot each time you do it. When should you call for help? Watch closely for changes in your health, and be sure to contact your doctor if you have any problems. Where can you learn more? Go to [email or web address]. org and sign in to your [employer] account. Enter Z030 in the [employer] box to learn more about \"How to Give a Subcutaneous Shot: Care Instructions. \"     If you do not have an account, please click on the \"Sign Up Now\" link.   Current as of: June 29, 2020               Content Version: 12.8  © 5396-0930 Healthwise, Incorporated. Care instructions adapted under license by [facility] [place] ([facility]). If you have questions about a medical condition or this instruction, always ask your healthcare professional. [address] any warranty or liability for your use of this information.

## 2021-05-07 NOTE — PROGRESS NOTES
Name: Tenzin Clarke  : 1965         Chief Complaint:     Chief Complaint   Patient presents with    Fatigue     x 2 months. c/o tired all the time, constipation. States \"having white stool, now turning black. \" denies abdominal pain       History of Present Illness:      Tenzin Clarke is a 61 y.o.  male who presents with Fatigue (x 2 months. c/o tired all the time, constipation. States \"having white stool, now turning black. \" denies abdominal pain)      Alex Mercado is here today for a routine office visit. He reports \"I feel blah\". He reports about 2 weeks ago he had a BM and \"it was snow white\". He denies taking any milk of mag, having a CT scan or change in medicaitons. He reports he had another white BM two weeks ago and no other white bowel movement since. He reports he will go several days without have BM's. He has a history of cirrohsis. He states \"I cant get the thurst out of my mouth\". He denies any abdominal pain. He states sometimes when \"I need to poop I have to force it out it doesn't come out natural, it feels like my intestines are dead\". He drink 20 glasses a day of water or crystal light and is still thirsty. Denies any polyuria or polyphagia. Denies any history of type 2 diabetes. He also reports \"I have black stool all the time for years\". Denies any bright red blood per rectum. Last BM was two days ago. Has had 20 lb weight loss but has been trying to \"eat healthy\".          Past Medical History:     Past Medical History:   Diagnosis Date    COPD (chronic obstructive pulmonary disease) ([address]     Hypertension     Liver disease       Reviewed all health maintenance requirements and ordered appropriate tests  Health Maintenance Due   Topic Date Due    Pneumococcal 0-64 years Vaccine ( - PPSV23) Never done    Diabetic foot exam  Never done    Diabetic retinal exam  Never done    Colon Cancer Screen FIT/FOBT  2021       Past Surgical History:     Past Surgical History: Procedure Laterality Date    ANKLE SURGERY      CARPAL TUNNEL RELEASE Bilateral     HERNIA REPAIR      KNEE ARTHROSCOPY      x's 5    NECK SURGERY          Medications:       Prior to Admission medications    Medication Sig Start Date End Date Taking? Authorizing Provider   polyethylene glycol (GLYCOLAX) 17 GM/SCOOP powder Take 17 g by mouth daily 5/7/21 6/6/21 Yes ADRIAN Lassiter CNP   metFORMIN (GLUCOPHAGE-XR) 500 MG extended release tablet Take 2 tablets by mouth daily (with breakfast) 5/7/21  Yes ADRIAN Lassiter CNP   insulin glargine (LANTUS SOLOSTAR) 100 UNIT/ML injection pen Inject 20 Units into the skin nightly 5/7/21  Yes ADRIAN Lassiter CNP   blood glucose monitor kit and supplies Dispense sufficient amount for indicated testing frequency plus additional to accommodate PRN testing needs. Dispense all needed supplies to include: monitor, strips, lancing device, lancets, control solutions, alcohol swabs. 5/7/21  Yes ADRIAN Lassiter CNP   Insulin Pen Needle 31G X 6 MM MISC 1 each by Does not apply route daily 5/7/21  Yes ADRIAN Lassiter CNP   furosemide (LASIX) 40 MG tablet Take 1 tablet by mouth once daily 3/19/21  Yes ADRIAN Bacon CNP   zolpidem (AMBIEN) 10 MG tablet Take 1 tablet by mouth nightly as needed for Sleep for up to 90 days.  3/15/21 6/13/21 Yes ADRIAN Bacon CNP   albuterol (PROVENTIL) (2.5 MG/3ML) 0.083% nebulizer solution USE 1 VIAL IN NEBULIZER EVERY 6 HOURS AS NEEDED FOR WHEEZING 2/22/21  Yes ADRIAN Bacon CNP   SYMBICORT 160-4.5 MCG/ACT AERO Inhale 2 puffs by mouth twice daily 2/22/21  Yes ADRIAN Bacon CNP   carvedilol (COREG) 3.125 MG tablet Take 1 tablet by mouth 2 times daily 1/7/21  Yes ADRIAN Bacon CNP   triamterene-hydroCHLOROthiazide (MAXZIDE-25) 37.5-25 MG per tablet Take 1 tablet by mouth daily 12/28/20  Yes Alessandro Segundo APRN - [place]   Respiratory Therapy Supplies (NEBULIZER/TUBING/MOUTHPIECE) KIT 1 kit Will refer patient to diabetic education. Will obtain basic blood work. Will start metformin 1000 mg extended release. We will also start him on Lantus 20 units at bedtime. Educated on signs of hypoglycemia. Patient was educated on monitoring blood sugars along with how to give himself insulin. He verbalized understanding and if has any questions he will contact the office. I advised him if he has any questions to call the office. Patient with a history of cirrhosis. There is no longer gastroenterology in [place]. Discussed referral to [facility] however he would like to defer to seeing a provider in [facility]. Order placed for Dr. Tomas Gannon. Will obtain hepatic function panel. Constipationstart MiraLAX daily. 1.  Adwoa Penaloza received counseling on the following healthy behaviors: nutrition, exercise and medication adherence  2. Patient given educational materials - see patient instructions  3. Was a self-tracking handout given in paper form or via [employer]? No  If yes, see orders or list here. 4.  Discussed use, benefit, and side effects of prescribed medications. Barriers to medication compliance addressed. All patient questions answered. Pt voiced understanding. 5.  Reviewed prior labs and health maintenance  6. Continue current medications, diet and exercise. Completed Refills   Requested Prescriptions     Signed Prescriptions Disp Refills    polyethylene glycol (GLYCOLAX) 17 GM/SCOOP powder 1530 g 1     Sig: Take 17 g by mouth daily    metFORMIN (GLUCOPHAGE-XR) 500 MG extended release tablet 180 tablet 1     Sig: Take 2 tablets by mouth daily (with breakfast)    insulin glargine (LANTUS SOLOSTAR) 100 UNIT/ML injection pen 5 pen 0     Sig: Inject 20 Units into the skin nightly    blood glucose monitor kit and supplies 1 kit 0     Sig: Dispense sufficient amount for indicated testing frequency plus additional to accommodate PRN testing needs.  Dispense all needed supplies to include: monitor, strips, lancing device, lancets, control solutions, alcohol swabs.  Insulin Pen Needle 31G X 6 MM MISC 100 each 3     Si each by Does not apply route daily         Return for Keep scheduled appointment in 1 month. Elida Man

## 2021-05-10 ENCOUNTER — HOSPITAL ENCOUNTER (OUTPATIENT)
Age: 56
Discharge: HOME | End: 2021-05-10
Payer: MEDICAID

## 2021-05-10 DIAGNOSIS — K74.60 CIRRHOSIS OF LIVER WITH ASCITES, UNSPECIFIED HEPATIC CIRRHOSIS TYPE (HCC): ICD-10-CM

## 2021-05-10 DIAGNOSIS — R18.8 CIRRHOSIS OF LIVER WITH ASCITES, UNSPECIFIED HEPATIC CIRRHOSIS TYPE (HCC): ICD-10-CM

## 2021-05-10 DIAGNOSIS — R53.83 FATIGUE, UNSPECIFIED TYPE: ICD-10-CM

## 2021-05-10 LAB
ABSOLUTE EOS #: 0.16 K/UL (ref 0–0.44)
ABSOLUTE IMMATURE GRANULOCYTE: 0.19 K/UL (ref 0–0.3)
ABSOLUTE LYMPH #: 3.17 K/UL (ref 1.1–3.7)
ABSOLUTE MONO #: 0.77 K/UL (ref 0.1–1.2)
ALBUMIN SERPL-MCNC: 3.8 G/DL (ref 3.5–5.2)
ALBUMIN/GLOBULIN RATIO: 1 (ref 1–2.5)
ALP BLD-CCNC: 127 U/L (ref 40–129)
ALT SERPL-CCNC: 24 U/L (ref 5–41)
ANION GAP SERPL CALCULATED.3IONS-SCNC: 12 MMOL/L (ref 9–17)
AST SERPL-CCNC: 35 U/L
BASOPHILS # BLD: 1 % (ref 0–2)
BASOPHILS ABSOLUTE: 0.12 K/UL (ref 0–0.2)
BILIRUB SERPL-MCNC: 0.45 MG/DL (ref 0.3–1.2)
BILIRUBIN DIRECT: <0.08 MG/DL
BILIRUBIN, INDIRECT: NORMAL MG/DL (ref 0–1)
BUN BLDV-MCNC: 20 MG/DL (ref 6–20)
BUN/CREAT BLD: 18 (ref 9–20)
CALCIUM SERPL-MCNC: 9.8 MG/DL (ref 8.6–10.4)
CHLORIDE BLD-SCNC: 91 MMOL/L (ref 98–107)
CHOLESTEROL/HDL RATIO: 10
CHOLESTEROL: 229 MG/DL
CO2: 28 MMOL/L (ref 20–31)
CREAT SERPL-MCNC: 1.1 MG/DL (ref 0.7–1.2)
DIFFERENTIAL TYPE: ABNORMAL
EOSINOPHILS RELATIVE PERCENT: 2 % (ref 1–4)
GFR AFRICAN AMERICAN: >60 ML/MIN
GFR NON-AFRICAN AMERICAN: >60 ML/MIN
GFR SERPL CREATININE-BSD FRML MDRD: ABNORMAL ML/MIN/{1.73_M2}
GFR SERPL CREATININE-BSD FRML MDRD: ABNORMAL ML/MIN/{1.73_M2}
GLOBULIN: NORMAL G/DL (ref 1.5–3.8)
GLUCOSE BLD-MCNC: 348 MG/DL (ref 70–99)
HCT VFR BLD CALC: 53.3 % (ref 40.7–50.3)
HDLC SERPL-MCNC: 23 MG/DL
HEMOGLOBIN: 17.4 G/DL (ref 13–17)
IMMATURE GRANULOCYTES: 2 %
LDL CHOLESTEROL DIRECT: 37 MG/DL
LDL CHOLESTEROL: ABNORMAL MG/DL (ref 0–130)
LYMPHOCYTES # BLD: 30 % (ref 24–43)
MCH RBC QN AUTO: 31.5 PG (ref 25.2–33.5)
MCHC RBC AUTO-ENTMCNC: 32.6 G/DL (ref 28.4–34.8)
MCV RBC AUTO: 96.6 FL (ref 82.6–102.9)
MONOCYTES # BLD: 7 % (ref 3–12)
NRBC AUTOMATED: 0 PER 100 WBC
PDW BLD-RTO: 14.1 % (ref 11.8–14.4)
PLATELET # BLD: 200 K/UL (ref 138–453)
PLATELET ESTIMATE: ABNORMAL
PMV BLD AUTO: 11.1 FL (ref 8.1–13.5)
POTASSIUM SERPL-SCNC: 4.3 MMOL/L (ref 3.7–5.3)
RBC # BLD: 5.52 M/UL (ref 4.21–5.77)
RBC # BLD: ABNORMAL 10*6/UL
SEG NEUTROPHILS: 58 % (ref 36–65)
SEGMENTED NEUTROPHILS ABSOLUTE COUNT: 6.17 K/UL (ref 1.5–8.1)
SODIUM BLD-SCNC: 131 MMOL/L (ref 135–144)
TOTAL PROTEIN: 7.5 G/DL (ref 6.4–8.3)
TRIGL SERPL-MCNC: 1327 MG/DL
TSH SERPL DL<=0.05 MIU/L-ACNC: 1.97 MIU/L (ref 0.3–5)
VLDLC SERPL CALC-MCNC: ABNORMAL MG/DL (ref 1–30)
WBC # BLD: 10.6 K/UL (ref 3.5–11.3)
WBC # BLD: ABNORMAL 10*3/UL

## 2021-05-10 PROCEDURE — 36415 COLL VENOUS BLD VENIPUNCTURE: CPT

## 2021-05-10 PROCEDURE — 80076 HEPATIC FUNCTION PANEL: CPT

## 2021-05-10 PROCEDURE — 83721 ASSAY OF BLOOD LIPOPROTEIN: CPT

## 2021-05-10 PROCEDURE — 80061 LIPID PANEL: CPT

## 2021-05-10 PROCEDURE — 84443 ASSAY THYROID STIM HORMONE: CPT

## 2021-05-10 PROCEDURE — 85025 COMPLETE CBC W/AUTO DIFF WBC: CPT

## 2021-05-10 PROCEDURE — 80048 BASIC METABOLIC PNL TOTAL CA: CPT

## 2021-05-11 ENCOUNTER — TELEPHONE (OUTPATIENT)
Dept: PRIMARY CARE CLINIC | Age: 56
End: 2021-05-11

## 2021-05-11 ENCOUNTER — HOSPITAL ENCOUNTER (OUTPATIENT)
Age: 56
Setting detail: SPECIMEN
Discharge: HOME | End: 2021-05-11
Payer: MEDICAID

## 2021-05-11 DIAGNOSIS — E78.5 DYSLIPIDEMIA: Primary | ICD-10-CM

## 2021-05-11 DIAGNOSIS — E11.69 TYPE 2 DIABETES MELLITUS WITH OTHER SPECIFIED COMPLICATION, UNSPECIFIED WHETHER LONG TERM INSULIN USE (HCC): ICD-10-CM

## 2021-05-11 LAB
CREATININE URINE: 69.5 MG/DL (ref 39–259)
MICROALBUMIN/CREAT 24H UR: <12 MG/L
MICROALBUMIN/CREAT UR-RTO: NORMAL MCG/MG CREAT

## 2021-05-11 PROCEDURE — 82043 UR ALBUMIN QUANTITATIVE: CPT

## 2021-05-11 PROCEDURE — 82570 ASSAY OF URINE CREATININE: CPT

## 2021-05-11 RX ORDER — CALCIUM CITRATE/VITAMIN D3 200MG-6.25
TABLET ORAL
COMMUNITY
Start: 2021-05-10 | End: 2021-06-23

## 2021-05-11 RX ORDER — GLUCOSAM/CHON-MSM1/C/MANG/BOSW 500-416.6
TABLET ORAL
COMMUNITY
Start: 2021-05-07 | End: 2021-10-22 | Stop reason: SDUPTHER

## 2021-05-11 RX ORDER — DIPHENHYDRAMINE HCL 25 MG
TABLET ORAL
COMMUNITY
Start: 2021-05-07

## 2021-05-11 RX ORDER — ATORVASTATIN CALCIUM 40 MG/1
40 TABLET, FILM COATED ORAL DAILY
Qty: 90 TABLET | Refills: 1 | Status: SHIPPED | OUTPATIENT
Start: 2021-05-11 | End: 2021-11-09

## 2021-05-11 ASSESSMENT — ENCOUNTER SYMPTOMS
COUGH: 1
SHORTNESS OF BREATH: 1
WHEEZING: 1

## 2021-05-11 NOTE — TELEPHONE ENCOUNTER
Josefa Mistry called to let you know his fasting BS was 277 this a.m. He knows it is still high but has come down.   He is scheduled for Diabetic Ed tomorrow

## 2021-05-11 NOTE — TELEPHONE ENCOUNTER
----- Message from Marie Rao, ADRIAN - CNP sent at 5/11/2021  8:19 AM EDT -----  Please notify patient that along with an elevated glucose his cholesterol is elevated. I would recommend that he start a cholesterol medication and we recheck his cholesterol in 3 months.

## 2021-05-12 ENCOUNTER — HOSPITAL ENCOUNTER (OUTPATIENT)
Dept: DIABETES SERVICES | Age: 56
Setting detail: THERAPIES SERIES
Discharge: HOME | End: 2021-05-12
Payer: MEDICAID

## 2021-05-12 PROCEDURE — G0108 DIAB MANAGE TRN  PER INDIV: HCPCS

## 2021-05-12 SDOH — ECONOMIC STABILITY: FOOD INSECURITY: ADDITIONAL INFORMATION: NO

## 2021-05-12 NOTE — PROGRESS NOTES
Diabetes Self-Management Education Record    Progress Note:  Patient arrived to appointment with wife for initial assessment for diabetes education for a diagnosis of Type 2 diabetes. Patient was diagnosed last week with an A1C of 14 on 05/07/21. There is a family history of father, grandmother, and 2 sisters with the diagnosis. He does report an unintentional weight loss of about 40 lbs since February and increased thirst and frequent urination, and bilateral foot and leg pain (achy, heavy, tingling, numbness). Mallika Brown He was started on Metformin 500 mg 2 tabs daily and Lantus insulin 20 units at hs. He is checking blood glucose daily in am and range has been 277-300 per recall. Glucose is checked in office at 1540 and is 364 which is 2 hours after eating. He typically has 3 meals a day but does occasional skip breakfast.  Sometimes snacks by having a piece of fruit. Beverages include flavored artificially sweetened water and diet soda. There is no activity or exercise and has COPD and leg pain which limit activities. Denies any questions regarding performing monitoring. Only willing to check daily at this time. Review target range of fasting  and 1-2 hours after a meal of <180 and A1C <7%. Review and given handout on insulin injection technique which includes pictures and importance of site rotation. Wife is currently administering insulin and patient plans to perform this but feeling overwhelmed at this time. Wife was able to demonstrate appropriate technique with demo pen. Handout given and reviewed on hypoglycemia signs, symptoms, causes, and treatment. No current eye exam and discuss the need to complete in the next several months as glucose stabilizes or sooner if visual problems noted. Encourage some form of activity to be considered and may need to start with chair or seated exercises.   Discuss bike or chair exercises and if he is interested in chair exercises that he can find some on You Tube. Encourage him to eat within an hour of rising and space meals every 4-5 hours after and include a HS snack. Discuss importance of having carb and protein together. He plans to add protein to his fruit. Given My Carbohydrate Guide and reviewed. Discuss where carbohydrates are found and common serving sizes. Review importance of consistent amount of carbohydrates and to measure foods. All questions answered and office number is given to call with questions or concerns. Appointment scheduled with dietitian and class with educator.     Participant Name: Viraj Reddy   Referring Provider:  [address] [place] [address], ADRIAN - CNP    Keys to learning:  Considerations: []Language []Emotional []Health Literacy  []Cognitive []Memory changes []Financial []Cultural   []Evangelical []Vision []Hearing  []Speech []Lack of desire  []Literacy  []Psycho-social  [x]None [x] Covid-19 group restrictions  If considerations are noted, accommodations made:     Identified barriers to learning/self management:     The following information was discussed:    [x] Diabetes disease process and treatment options   [x] Healthy nutrition, carbohydrate counting, meal planning  [x] Monitoring blood glucose and other parameters; interpreting and using results  [x] Acute complications--prevention, detection and treatment  [] Medication management and safety   [x] Incorporating physical activity into lifestyle   [x] Exercise for Health, Reducing Risks for Heart Disease, Diabetes and Heart Health  [] Preventing, through risk reduction behaviors, detecting, and treating chronic complications  [] Sick Day management  [] Developing personalized strategies to address psychosocial issues and concerns  [x] Developing personalized strategies to promote health and behavior change through goalsetting, behavior change strategies aimed at risk reduction  [] Special situations--disaster planning, travel, social activities  [] Foot, skin, and dental care    Session Assessment & Evaluation Ratings:  1=Needs Instruction  2=Needs Review  3=Comprehends Key Points  4=Demonstrates Understanding/Competency  NC=Not Covered   N/A=Not Applicable Initial  Assess    Date:  05/12/21 2nd  Visit     Date:   3rd  Visit    Date: 4th  Visit    Date: Comments  S.O.C=Stage of Change/Readiness to change:  · Pre=Pre-contemplation stage--not thinking about changing  · C=Contemplation stage--ambivalent about changing  · P=Preparation stage--prepared to made a specific change  · A=Action stage--committed to modify behaviors  · M=Maintenance and relapse prevention--incorporating new behavior   Participant Stated  Goal        I will not go more than 5 hours without eating and start measuring my foods to decrease portions. Participant Stated Goal       S. O.C  [] PRE  [x] C  [] P      [] A  [] M                    S.O.C  [] PRE  [] C  [] P      [] A  [] M     S.O.C  [] PRE  [] C  [] P      [] A  [] M                     S.O.C  [] PRE  [] C  [] P      [] A  [] M      S.O.C  [] PRE  [] C  [] P      [] A  [] M                    S.O.C  [] PRE  [] C  [] P      [] A  [] M     S.O.C  [] PRE  [] C  [] P      [] A  [] M                    S.O.C  [] PRE  [] C  [] P      [] A  [] M   Current main goal attainment frequency:   [x] Never  [] Some  [] Half  [] Most  [] All    Participant confidence to master goal this visit:   [] Excellent  [x] Good  [] Ian Hinton  [] Poor            Current main goal attainment frequency:   [] Never  [] Some  [] Half  [] Most  [] All    Participant confidence to master goal this visit:   [] Excellent  [] Good [] Fair  [] Poor                  Session  Topics & Learning Objectives:      Comments:   Diabetes disease process & Treatment process: Define diabetes & prediabetes; identify own type of diabetes; role of the pancreas; signs/symptoms; diagnostic criteria; prevention and treatment options; contributing factors.                   Rating  [x] 1  [] 2  [] glucose,appropriate techniques and problem solving. Safe lancet disposal.    Rating  [x] 1  [] 2  [] 3  [] 4  [] NC  [] N/A     Rating  [] 1  [] 2  [] 3  [] 4  [] NC  [] N/A       Rating  [] 1  [] 2  [] 3  [] 4  [] NC  [] N/A     Rating  [] 1  [] 2  [] 3  [] 4  [] NC  [] N/A         Prevention, detection & treatment of acute complications: List symptoms of hyper- and hypoglycemia; Describe how to treat low blood sugar & actions for lowering high blood glucose levels. Prevention and treatment strategies. Rating  [x] 1  [] 2  [] 3  [] 4  [] NC  [] N/A   Rating  [] 1  [] 2  [] 3  [] 4  [] NC  [] N/A   Rating  [] 1  [] 2  [] 3  [] 4  [] NC  [] N/A Rating  [] 1  [] 2  [] 3  [] 4  [] NC  [] N/A                   Describe sick day guidelines and indications for physician notification. Rating  [x] 1  [] 2  [] 3  [] 4  [] NC  [] N/A     Rating  [] 1  [] 2  [] 3  [] 4  [] NC  [] N/A     Rating  [] 1  [] 2  [] 3  [] 4  [] NC  [] N/A     Rating  [] 1  [] 2  [] 3  [] 4  [] NC  [] N/A        Prevention, detection & treatment of chronic complications: Define the natural course of diabetes & describe the relationship of blood glucose levels to long term complications of diabetes. Identify preventative measures and standard of care. Rating  [x] 1  [] 2  [] 3  [] 4  [] NC  [] N/A     Rating  [] 1  [] 2  [] 3  [] 4  [] NC  [] N/A     Rating  [] 1  [] 2  [] 3  [] 4  [] NC  [] N/A     Rating  [] 1  [] 2  [] 3  [] 4  [] NC  [] N/A      Developing strategies to address psychosocial issues: Describe feelings about living with diabetes; Identify support needed & support network. Describe how stress, depression, and anxiety affect blood glucose. Identify coping strategies.  Rating  [x] 1  [] 2  [] 3  [] 4  [] NC  [] N/A       Rating  [] 1  [] 2  [] 3  [] 4  [] NC  [] N/A     Rating  [] 1  [] 2  [] 3  [] 4  [] NC  [] N/A     Rating  [] 1  [] 2  [] 3  [] 4  [] NC  [] N/A          Developing strategies to promote health/change behavior: for People With Diabetes   [] Other       Diabetes Self Management Support Plan: To assist and support your continued progress in managing your diabetes following education-    ( X )  Gym or exercise program of your choice. (Suggestions:  [employer], Circuit training, any exercise facility and your local Physical Therapy or Cardiac Rehabilitation exercise Program)      (  )   Library    ( X )    ADA website:  [employer]. org    (  )   Http: [employer]    (  )   Diabetes Forecast [place] you may get this information on the ADA web site.     (  )  Diabetes Interview - [place]   [phone]    (  )  Diabetes Self Management (bi-monthly magazine)  [phone]    (  ) Support group: (  ) Support group: [facility] first Tuesday of the month at 9 am and [facility] third Wednesday of the month at 9 am    (  )  Health Journeys [employer]  (relaxation tapes for people with Diabetes)  [phone]    (  )  Your suggestions:                         Adrianna Lopez RN, BSN, Christa Irizarry

## 2021-05-19 ENCOUNTER — TELEPHONE (OUTPATIENT)
Dept: PRIMARY CARE CLINIC | Age: 56
End: 2021-05-19

## 2021-05-19 RX ORDER — METHYLPREDNISOLONE 4 MG/1
TABLET ORAL
COMMUNITY
Start: 2021-05-18 | End: 2021-06-01 | Stop reason: ALTCHOICE

## 2021-05-19 NOTE — TELEPHONE ENCOUNTER
Increase Lantus to 25 units nightly, call Friday with readings. Also the steroid that Dr. Susan Keith prescribed is going to make his blood sugar rise.

## 2021-05-19 NOTE — TELEPHONE ENCOUNTER
Yumiko Kaelyn is concerned as his BS has stayed 270-370 (fasting) He has been working on his diet and taking meds. His eyesight is concerning him as it has changed drastically. He can't read pill bottles or see distant without bifocals. Yumiko Art wants to know if his meds can be increased to help his eyesight. He is mowing & will call back @ 3.

## 2021-05-19 NOTE — TELEPHONE ENCOUNTER
Patient called back informed patient of orders from Bonnita Eisenmenger, [facility] understanding.

## 2021-05-21 ENCOUNTER — TELEPHONE (OUTPATIENT)
Dept: PRIMARY CARE CLINIC | Age: 56
End: 2021-05-21

## 2021-05-21 DIAGNOSIS — E11.69 TYPE 2 DIABETES MELLITUS WITH OTHER SPECIFIED COMPLICATION, UNSPECIFIED WHETHER LONG TERM INSULIN USE (HCC): ICD-10-CM

## 2021-05-21 RX ORDER — INSULIN GLARGINE 100 [IU]/ML
30 INJECTION, SOLUTION SUBCUTANEOUS NIGHTLY
Qty: 5 PEN | Refills: 0 | Status: SHIPPED
Start: 2021-05-21 | End: 2021-05-24 | Stop reason: DRUGHIGH

## 2021-05-21 NOTE — TELEPHONE ENCOUNTER
Patient called with blood sugar results:5/20/2021-280 AM, 5/21/2021- 244. Lantus was increased to 25 units nightly.

## 2021-05-24 ENCOUNTER — TELEPHONE (OUTPATIENT)
Dept: PRIMARY CARE CLINIC | Age: 56
End: 2021-05-24

## 2021-05-24 DIAGNOSIS — E11.69 TYPE 2 DIABETES MELLITUS WITH OTHER SPECIFIED COMPLICATION, UNSPECIFIED WHETHER LONG TERM INSULIN USE (HCC): ICD-10-CM

## 2021-05-24 RX ORDER — INSULIN GLARGINE 100 [IU]/ML
36 INJECTION, SOLUTION SUBCUTANEOUS NIGHTLY
Qty: 5 PEN | Refills: 0 | Status: SHIPPED
Start: 2021-05-24 | End: 2021-05-28 | Stop reason: DRUGHIGH

## 2021-05-24 NOTE — TELEPHONE ENCOUNTER
Patient called the office informing of blood sugar levels over the weekend. Patient reported [phone], and 258 for blood sugar levels.

## 2021-05-28 ENCOUNTER — TELEPHONE (OUTPATIENT)
Dept: PRIMARY CARE CLINIC | Age: 56
End: 2021-05-28

## 2021-05-28 DIAGNOSIS — E11.69 TYPE 2 DIABETES MELLITUS WITH OTHER SPECIFIED COMPLICATION, UNSPECIFIED WHETHER LONG TERM INSULIN USE (HCC): ICD-10-CM

## 2021-05-28 RX ORDER — INSULIN GLARGINE 100 [IU]/ML
40 INJECTION, SOLUTION SUBCUTANEOUS NIGHTLY
Qty: 5 PEN | Refills: 0 | Status: SHIPPED
Start: 2021-05-28 | End: 2021-06-01 | Stop reason: DRUGHIGH

## 2021-06-01 ENCOUNTER — TELEPHONE (OUTPATIENT)
Dept: PRIMARY CARE CLINIC | Age: 56
End: 2021-06-01

## 2021-06-01 DIAGNOSIS — E11.69 TYPE 2 DIABETES MELLITUS WITH OTHER SPECIFIED COMPLICATION, UNSPECIFIED WHETHER LONG TERM INSULIN USE (HCC): ICD-10-CM

## 2021-06-01 RX ORDER — INSULIN GLARGINE 100 [IU]/ML
44 INJECTION, SOLUTION SUBCUTANEOUS NIGHTLY
Qty: 5 PEN | Refills: 0 | Status: SHIPPED
Start: 2021-06-01 | End: 2021-06-04 | Stop reason: SDUPTHER

## 2021-06-01 NOTE — TELEPHONE ENCOUNTER
Called patient and informed him that Rossy Marjorie would like him to increase lantus to 44 units and to call office with blood sugar readings on Friday. Verbalizes understanding.

## 2021-06-04 ENCOUNTER — TELEPHONE (OUTPATIENT)
Dept: PRIMARY CARE CLINIC | Age: 56
End: 2021-06-04

## 2021-06-04 DIAGNOSIS — E11.69 TYPE 2 DIABETES MELLITUS WITH OTHER SPECIFIED COMPLICATION, UNSPECIFIED WHETHER LONG TERM INSULIN USE (HCC): ICD-10-CM

## 2021-06-04 RX ORDER — INSULIN GLARGINE 100 [IU]/ML
44 INJECTION, SOLUTION SUBCUTANEOUS NIGHTLY
Qty: 5 PEN | Refills: 5 | Status: SHIPPED | OUTPATIENT
Start: 2021-06-04 | End: 2021-06-09 | Stop reason: DRUGHIGH

## 2021-06-04 NOTE — TELEPHONE ENCOUNTER
Phone call from patient with blood sugars. Patient also wanting refill of Lantus and wants to know if anything else that could be done to help lower his blood sugars. Blood Sugars  Wed:   200  Thurs: 183  Friday: 205     Health Maintenance   Topic Date Due    Pneumococcal 0-64 years Vaccine (1 of 2 - PPSV23) Never done    Diabetic foot exam  Never done    Diabetic retinal exam  Never done    DTaP/Tdap/Td vaccine (1 - Tdap) Never done    Colon Cancer Screen FIT/FOBT  03/13/2021    Hepatitis A vaccine (1 of 2 - Risk 2-dose series) 11/07/2021 (Originally 4/24/1966)    Hepatitis B vaccine (1 of 3 - Risk 3-dose series) 05/07/2022 (Originally 4/24/1984)    Shingles Vaccine (1 of 2) 05/07/2022 (Originally 4/24/2014)    HIV screen  05/07/2022 (Originally 4/24/1980)    A1C test (Diabetic or Prediabetic)  08/07/2021    Lipid screen  05/10/2022    Potassium monitoring  05/10/2022    Creatinine monitoring  05/10/2022    Diabetic microalbuminuria test  05/11/2022    Flu vaccine  Completed    COVID-19 Vaccine  Completed    Hepatitis C screen  Completed    Hib vaccine  Aged Out    Meningococcal (ACWY) vaccine  Aged Out             (applicable per patient's age: Cancer Screenings, Depression Screening, Fall Risk Screening, Immunizations)    Hemoglobin A1C (%)   Date Value   05/07/2021 14     Microalb/Crt.  Ratio (mcg/mg creat)   Date Value   05/11/2021 CANNOT BE CALCULATED     LDL Cholesterol (mg/dL)   Date Value   05/10/2021          AST (U/L)   Date Value   05/10/2021 35     ALT (U/L)   Date Value   05/10/2021 24     BUN (mg/dL)   Date Value   05/10/2021 20      (goal A1C is < 7)   (goal LDL is <100) need 30-50% reduction from baseline     BP Readings from Last 3 Encounters:   05/07/21 124/84   12/07/20 138/86   10/27/20 136/72    (goal /80)      All Future Testing planned in [facility]:  Lab Frequency Next Occurrence   ESOPHAGOSCOPY / EGD Once 09/01/2020   Lipid Panel Once 08/09/2021       Next Visit Date:  Future Appointments   Date Time Provider Vinayak Blancoi   6/9/2021 11:00 AM ADRIAN Pinto - CNP [facility]   6/10/2021  1:45 PM Korey Blackburn MD TIFF PULM [facility]   6/10/2021  3:00 PM [facility] DIABETES EDUCATION ROOM [facility] Diab Ed [place]            Patient Active Problem List:     Liver disease

## 2021-06-07 NOTE — TELEPHONE ENCOUNTER
Refill was sent on June 4, 2021 to [facility], do we need to resend? We will talk about his blood sugars when he comes in for an appointment on Wednesday.

## 2021-06-07 NOTE — TELEPHONE ENCOUNTER
Called patient and left message that Maura Gomes would talk to him on Wednesday re: his blood sugars and that script for Lantus was sent to the pharmacy. To call office with any questions.

## 2021-06-08 ENCOUNTER — OFFICE VISIT (OUTPATIENT)
Dept: PRIMARY CARE CLINIC | Age: 56
End: 2021-06-08
Payer: MEDICAID

## 2021-06-08 VITALS
WEIGHT: 281.6 LBS | RESPIRATION RATE: 20 BRPM | BODY MASS INDEX: 38.19 KG/M2 | DIASTOLIC BLOOD PRESSURE: 60 MMHG | HEART RATE: 98 BPM | SYSTOLIC BLOOD PRESSURE: 111 MMHG | TEMPERATURE: 98 F

## 2021-06-08 DIAGNOSIS — K04.7 DENTAL INFECTION: Primary | ICD-10-CM

## 2021-06-08 PROCEDURE — 4004F PT TOBACCO SCREEN RCVD TLK: CPT | Performed by: NURSE PRACTITIONER

## 2021-06-08 PROCEDURE — G8427 DOCREV CUR MEDS BY ELIG CLIN: HCPCS | Performed by: NURSE PRACTITIONER

## 2021-06-08 PROCEDURE — 3017F COLORECTAL CA SCREEN DOC REV: CPT | Performed by: NURSE PRACTITIONER

## 2021-06-08 PROCEDURE — 99213 OFFICE O/P EST LOW 20 MIN: CPT | Performed by: NURSE PRACTITIONER

## 2021-06-08 PROCEDURE — G8417 CALC BMI ABV UP PARAM F/U: HCPCS | Performed by: NURSE PRACTITIONER

## 2021-06-08 RX ORDER — CLINDAMYCIN HYDROCHLORIDE 300 MG/1
300 CAPSULE ORAL 3 TIMES DAILY
Qty: 30 CAPSULE | Refills: 0 | Status: SHIPPED | OUTPATIENT
Start: 2021-06-08 | End: 2021-06-18

## 2021-06-08 ASSESSMENT — ENCOUNTER SYMPTOMS
SINUS PAIN: 0
SORE THROAT: 0
VOMITING: 0
TROUBLE SWALLOWING: 0
NAUSEA: 0

## 2021-06-08 NOTE — PROGRESS NOTES
90 tablet 3    ammonium lactate (AMLACTIN) 12 % cream       insulin glargine (LANTUS SOLOSTAR) 100 UNIT/ML injection pen Inject 44 Units into the skin nightly 5 pen 5    Blood Glucose Monitoring Suppl (TRUE METRIX AIR GLUCOSE METER) w/Device KIT USE TO CHECK GLUCOSE DAILY      blood glucose monitor kit and supplies Dispense sufficient amount for indicated testing frequency plus additional to accommodate PRN testing needs. Dispense all needed supplies to include: monitor, strips, lancing device, lancets, control solutions, alcohol swabs. 1 kit 0    Insulin Pen Needle 31G X 6 MM MISC 1 each by Does not apply route daily 100 each 3    zolpidem (AMBIEN) 10 MG tablet Take 1 tablet by mouth nightly as needed for Sleep for up to 90 days. 30 tablet 2    Respiratory Therapy Supplies (NEBULIZER/TUBING/MOUTHPIECE) KIT 1 kit by Does not apply route daily as needed (SOB/Wheezing) 1 kit 0    Nebulizers (COMPRESSOR/NEBULIZER) MISC 1 each by Does not apply route daily as needed (SOB/Wheezing) 1 each 0    buPROPion (WELLBUTRIN XL) 150 MG extended release tablet TAKE 1 TABLET BY MOUTH ONCE DAILY IN THE MORNING 90 tablet 3    nicotine (NICODERM CQ) 14 MG/24HR Place 1 patch onto the skin every 24 hours 30 patch 3    Colchicine (COLCRYS PO) Take 1 tablet by mouth 2 times daily       oxymetazoline (AFRIN) 0.05 % nasal spray 2 sprays by Nasal route 2 times daily (Patient not taking: Reported on 6/8/2021)       No current facility-administered medications for this visit. Allergies   Allergen Reactions    Pcn [Penicillins] Rash       Subjective:      Review of Systems   Constitutional: Negative for activity change and fever. HENT: Positive for dental problem. Negative for sinus pain, sore throat and trouble swallowing. Gastrointestinal: Negative for nausea and vomiting. Objective:     Physical Exam  Vitals and nursing note reviewed. Constitutional:       General: He is not in acute distress.      Appearance: Normal appearance. He is not toxic-appearing or diaphoretic. HENT:      Head: Normocephalic and atraumatic. Mouth/Throat:      Mouth: Mucous membranes are moist. No oral lesions or angioedema. Dentition: Abnormal dentition. Dental tenderness present. Pharynx: No oropharyngeal exudate. Cardiovascular:      Rate and Rhythm: Normal rate and regular rhythm. Pulmonary:      Effort: Pulmonary effort is normal.      Breath sounds: No wheezing. Neurological:      General: No focal deficit present. Mental Status: He is alert and oriented to person, place, and time. Psychiatric:         Mood and Affect: Mood normal.         Behavior: Behavior normal.       /60 (Site: Right Upper Arm, Position: Sitting, Cuff Size: Large Adult)   Pulse 98   Temp 98 °F (36.7 °C) (Temporal)   Resp 20   Wt 281 lb 9.6 oz (127.7 kg)   BMI 38.19 kg/m²     Assessment:      Diagnosis Orders   1. Dental infection  clindamycin (CLEOCIN) 300 MG capsule       Plan:     · Clindamycin 300 mg 3 times daily x10 days. · Continue antibiotic until all doses are completed, take with food to lessen nausea and GI side effects. · Probiotic or greek yogurt daily while on antibiotics  · Aleve/Ibuprofen/Tylenol OTC as directed on the package as needed for pain, discomfort or fever. Take with food. · Warm salt water rinses as needed to relieve discomfort/pain  · Recommend smoking cessation  · Instructed on proper oral hygiene with soft bristled toothbrush, manual or powered  · Avoid hot or cold foods that may affect sensitive teeth/gums  · Eat on other side of mouth to avoid discomfort with pressure on tooth  · Follow up with dentist as soon as possible  · To ER for sudden increase in pain, difficulty swallowing, difficulty breathing, hives, rash or inability to open mouth fully. Return if symptoms worsen or fail to improve.     Orders Placed This Encounter   Medications    clindamycin (CLEOCIN) 300 MG capsule     Sig: Take 1 capsule by mouth 3 times daily for 10 days     Dispense:  30 capsule     Refill:  0          All patient questions answered. Pt voiced understanding.       Electronically signed by ADRIAN Goetz CNP on 6/8/2021 at 3:52 PM

## 2021-06-09 ENCOUNTER — OFFICE VISIT (OUTPATIENT)
Dept: PRIMARY CARE CLINIC | Age: 56
End: 2021-06-09
Payer: MEDICAID

## 2021-06-09 VITALS
RESPIRATION RATE: 22 BRPM | OXYGEN SATURATION: 95 % | WEIGHT: 279.1 LBS | HEART RATE: 84 BPM | SYSTOLIC BLOOD PRESSURE: 128 MMHG | BODY MASS INDEX: 37.85 KG/M2 | DIASTOLIC BLOOD PRESSURE: 74 MMHG | TEMPERATURE: 97.6 F

## 2021-06-09 DIAGNOSIS — I10 ESSENTIAL HYPERTENSION: ICD-10-CM

## 2021-06-09 DIAGNOSIS — Z79.4 TYPE 2 DIABETES MELLITUS WITH OTHER SPECIFIED COMPLICATION, WITH LONG-TERM CURRENT USE OF INSULIN (HCC): Primary | ICD-10-CM

## 2021-06-09 DIAGNOSIS — J44.9 CHRONIC OBSTRUCTIVE PULMONARY DISEASE, UNSPECIFIED COPD TYPE (HCC): ICD-10-CM

## 2021-06-09 DIAGNOSIS — Z23 NEED FOR TDAP VACCINATION: ICD-10-CM

## 2021-06-09 DIAGNOSIS — E11.69 TYPE 2 DIABETES MELLITUS WITH OTHER SPECIFIED COMPLICATION, WITH LONG-TERM CURRENT USE OF INSULIN (HCC): Primary | ICD-10-CM

## 2021-06-09 DIAGNOSIS — Z12.11 SCREEN FOR COLON CANCER: Primary | ICD-10-CM

## 2021-06-09 PROBLEM — E11.9 DIABETES MELLITUS (HCC): Status: ACTIVE | Noted: 2021-06-09

## 2021-06-09 LAB — HBA1C MFR BLD: 12.8 %

## 2021-06-09 PROCEDURE — G8926 SPIRO NO PERF OR DOC: HCPCS | Performed by: NURSE PRACTITIONER

## 2021-06-09 PROCEDURE — G8427 DOCREV CUR MEDS BY ELIG CLIN: HCPCS | Performed by: NURSE PRACTITIONER

## 2021-06-09 PROCEDURE — G8417 CALC BMI ABV UP PARAM F/U: HCPCS | Performed by: NURSE PRACTITIONER

## 2021-06-09 PROCEDURE — 4004F PT TOBACCO SCREEN RCVD TLK: CPT | Performed by: NURSE PRACTITIONER

## 2021-06-09 PROCEDURE — 3023F SPIROM DOC REV: CPT | Performed by: NURSE PRACTITIONER

## 2021-06-09 PROCEDURE — 3017F COLORECTAL CA SCREEN DOC REV: CPT | Performed by: NURSE PRACTITIONER

## 2021-06-09 PROCEDURE — 90471 IMMUNIZATION ADMIN: CPT | Performed by: NURSE PRACTITIONER

## 2021-06-09 PROCEDURE — 83036 HEMOGLOBIN GLYCOSYLATED A1C: CPT | Performed by: NURSE PRACTITIONER

## 2021-06-09 PROCEDURE — 2022F DILAT RTA XM EVC RTNOPTHY: CPT | Performed by: NURSE PRACTITIONER

## 2021-06-09 PROCEDURE — 99214 OFFICE O/P EST MOD 30 MIN: CPT | Performed by: NURSE PRACTITIONER

## 2021-06-09 PROCEDURE — 90715 TDAP VACCINE 7 YRS/> IM: CPT | Performed by: NURSE PRACTITIONER

## 2021-06-09 PROCEDURE — 3046F HEMOGLOBIN A1C LEVEL >9.0%: CPT | Performed by: NURSE PRACTITIONER

## 2021-06-09 RX ORDER — LOSARTAN POTASSIUM 25 MG/1
25 TABLET ORAL DAILY
Qty: 90 TABLET | Refills: 3 | Status: SHIPPED | OUTPATIENT
Start: 2021-06-09 | End: 2022-05-16

## 2021-06-09 RX ORDER — CARVEDILOL 3.12 MG/1
3.12 TABLET ORAL 2 TIMES DAILY
Qty: 180 TABLET | Refills: 3 | Status: SHIPPED | OUTPATIENT
Start: 2021-06-09 | End: 2022-05-24

## 2021-06-09 RX ORDER — INSULIN GLARGINE 100 [IU]/ML
24 INJECTION, SOLUTION SUBCUTANEOUS 2 TIMES DAILY
Qty: 5 PEN | Refills: 5 | Status: SHIPPED
Start: 2021-06-09 | End: 2021-12-06 | Stop reason: SDUPTHER

## 2021-06-09 RX ORDER — FUROSEMIDE 40 MG/1
TABLET ORAL
Qty: 90 TABLET | Refills: 3 | Status: SHIPPED | OUTPATIENT
Start: 2021-06-09 | End: 2022-05-16

## 2021-06-09 SDOH — ECONOMIC STABILITY: FOOD INSECURITY: WITHIN THE PAST 12 MONTHS, YOU WORRIED THAT YOUR FOOD WOULD RUN OUT BEFORE YOU GOT MONEY TO BUY MORE.: SOMETIMES TRUE

## 2021-06-09 SDOH — ECONOMIC STABILITY: FOOD INSECURITY: WITHIN THE PAST 12 MONTHS, THE FOOD YOU BOUGHT JUST DIDN'T LAST AND YOU DIDN'T HAVE MONEY TO GET MORE.: SOMETIMES TRUE

## 2021-06-09 ASSESSMENT — ENCOUNTER SYMPTOMS
HEMOPTYSIS: 0
FREQUENT THROAT CLEARING: 0
CHEST TIGHTNESS: 0
COUGH: 0
HOARSE VOICE: 0
NAUSEA: 0
RHINORRHEA: 0
SORE THROAT: 0
SHORTNESS OF BREATH: 1
SPUTUM PRODUCTION: 0
WHEEZING: 0
DIFFICULTY BREATHING: 0
VOMITING: 0
ABDOMINAL PAIN: 0
DIARRHEA: 0
CONSTIPATION: 0

## 2021-06-09 ASSESSMENT — COPD QUESTIONNAIRES: COPD: 1

## 2021-06-09 ASSESSMENT — SOCIAL DETERMINANTS OF HEALTH (SDOH): HOW HARD IS IT FOR YOU TO PAY FOR THE VERY BASICS LIKE FOOD, HOUSING, MEDICAL CARE, AND HEATING?: SOMEWHAT HARD

## 2021-06-09 NOTE — PROGRESS NOTES
After obtaining consent, and per orders of Guerrero Might injection of BBoostrix given in Left deltoid by Yuridia Conley LPN. Patient instructed to remain in clinic for 20 minutes afterwards, and to report any adverse reaction to me immediately.

## 2021-06-09 NOTE — PROGRESS NOTES
Name: Kemar Lindsay  : 1965         Chief Complaint:     Chief Complaint   Patient presents with    Hypertension     routine check    Diabetes       History of Present Illness:      Kemar Lindsay is a 61 y.o.  male who presents with Hypertension (routine check) and Diabetes      Britney Morrison is here today for routine follow-up visit. DM-he is at an A1c of 12.8 from an entry level of greater than 14. He is compliant with his medications. He is currently taking Lantus 44 units nightly and Metformin. He meets with the dietitian today. He has worked on his diet but states he is still eating past as, breads, carbs etc.  See below for further detail. Diabetes  He presents for his follow-up diabetic visit. He has type 2 diabetes mellitus. His disease course has been improving. There are no hypoglycemic associated symptoms. Pertinent negatives for hypoglycemia include no dizziness or headaches. There are no diabetic associated symptoms. Pertinent negatives for diabetes include no chest pain, no fatigue, no polydipsia, no polyphagia and no polyuria. There are no hypoglycemic complications. Pertinent negatives for hypoglycemia complications include no hospitalization, no nocturnal hypoglycemia and no required assistance. Symptoms are improving. Diabetic complications include peripheral neuropathy. Pertinent negatives for diabetic complications include no CVA, heart disease or nephropathy. Risk factors for coronary artery disease include diabetes mellitus, dyslipidemia, family history, male sex, obesity, sedentary lifestyle and stress. Current diabetic treatment includes insulin injections and oral agent (monotherapy). He is compliant with treatment all of the time. His weight is decreasing steadily. He is following a generally healthy diet. When asked about meal planning, he reported none. He has not had a previous visit with a dietitian. He rarely participates in exercise.  His home blood glucose trend is decreasing steadily. His breakfast blood glucose range is generally >200 mg/dl. An ACE inhibitor/angiotensin II receptor blocker is being taken. He does not see a podiatrist.Eye exam is current. Hypertension  This is a chronic problem. The current episode started more than 1 year ago. The problem is unchanged. The problem is controlled. Associated symptoms include shortness of breath (chronic). Pertinent negatives include no chest pain, headaches, neck pain or palpitations. There are no associated agents to hypertension. Risk factors for coronary artery disease include diabetes mellitus, dyslipidemia, family history, obesity, male gender and stress. Past treatments include diuretics. The current treatment provides moderate improvement. Compliance problems include exercise and diet. There is no history of kidney disease, CAD/MI, CVA or heart failure. There is no history of chronic renal disease. COPD  He complains of shortness of breath (chronic). There is no chest tightness, cough, difficulty breathing, frequent throat clearing, hemoptysis, hoarse voice, sputum production or wheezing. This is a chronic problem. The current episode started more than 1 year ago. The problem occurs intermittently. The problem has been unchanged. Pertinent negatives include no chest pain, fever, headaches, rhinorrhea or sore throat. His symptoms are aggravated by URI, strenuous activity, exposure to smoke, change in weather and climbing stairs. His symptoms are alleviated by beta-agonist, steroid inhaler, rest and leukotriene antagonist. He reports significant improvement on treatment. His symptoms are not alleviated by rest. Risk factors for lung disease include smoking/tobacco exposure. His past medical history is significant for bronchitis, COPD, emphysema and pneumonia. There is no history of asthma or bronchiectasis.          Past Medical History:     Past Medical History:   Diagnosis Date    COPD (chronic obstructive 21.00 pack-year smoking history. He has never used smokeless tobacco.  Alcohol:      reports previous alcohol use. Drug Use:  reports current drug use. Drug: Marijuana. Family History:     Family History   Problem Relation Age of Onset    Allergies Mother     Diabetes Father     Esophageal Cancer Father        Review of Systems:     Positive and Negative as described in HPI    Review of Systems   Constitutional: Negative for chills, fatigue and fever. HENT: Negative for congestion, hoarse voice, rhinorrhea and sore throat. Eyes: Negative for visual disturbance. Respiratory: Positive for shortness of breath (chronic). Negative for cough, hemoptysis, sputum production and wheezing. Cardiovascular: Positive for leg swelling (imtermttent). Negative for chest pain and palpitations. Gastrointestinal: Negative for abdominal pain, constipation, diarrhea, nausea and vomiting. Endocrine: Negative for polydipsia, polyphagia and polyuria. Genitourinary: Negative for difficulty urinating and dysuria. Musculoskeletal: Negative for gait problem, neck pain and neck stiffness. Skin: Negative for rash. Neurological: Negative for dizziness, syncope, light-headedness and headaches. Physical Exam:   Vitals:  /74 (Position: Sitting)   Pulse 84   Temp 97.6 °F (36.4 °C) (Temporal)   Resp 22   Wt 279 lb 1.6 oz (126.6 kg)   SpO2 95%   BMI 37.85 kg/m²     Physical Exam  Vitals and nursing note reviewed. Constitutional:       General: He is not in acute distress. Appearance: Normal appearance. He is obese. He is not ill-appearing. HENT:      Mouth/Throat:      Mouth: Mucous membranes are moist.   Eyes:      General: No scleral icterus. Conjunctiva/sclera: Conjunctivae normal.   Neck:      Vascular: No carotid bruit. Cardiovascular:      Rate and Rhythm: Normal rate and regular rhythm.    Pulmonary:      Effort: Pulmonary effort is normal.      Breath sounds: Decreased breath sounds present. No wheezing. Abdominal:      General: Bowel sounds are normal. There is no distension. Palpations: Abdomen is soft. Tenderness: There is no abdominal tenderness. Comments: Morbidly obese abdomen   Musculoskeletal:      Cervical back: Normal range of motion and neck supple. Right lower leg: No edema. Left lower leg: No edema. Lymphadenopathy:      Cervical: No cervical adenopathy. Skin:     General: Skin is warm and dry. Neurological:      Mental Status: He is alert and oriented to person, place, and time. Psychiatric:         Mood and Affect: Mood normal.         Behavior: Behavior normal.         Data:     Lab Results   Component Value Date     05/10/2021    K 4.3 05/10/2021    CL 91 05/10/2021    CO2 28 05/10/2021    BUN 20 05/10/2021    CREATININE 1.10 05/10/2021    GLUCOSE 348 05/10/2021    PROT 7.5 05/10/2021    LABALBU 3.8 05/10/2021    BILITOT 0.45 05/10/2021    ALKPHOS 127 05/10/2021    AST 35 05/10/2021    ALT 24 05/10/2021     Lab Results   Component Value Date    WBC 10.6 05/10/2021    RBC 5.52 05/10/2021    HGB 17.4 05/10/2021    HCT 53.3 05/10/2021    MCV 96.6 05/10/2021    MCH 31.5 05/10/2021    MCHC 32.6 05/10/2021    RDW 14.1 05/10/2021     05/10/2021    MPV 11.1 05/10/2021     Lab Results   Component Value Date    TSH 1.97 05/10/2021     Lab Results   Component Value Date    CHOL 229 05/10/2021    HDL 23 05/10/2021    LABA1C 12.8 06/09/2021       Assessment/Plan:      Diagnosis Orders   1. Type 2 diabetes mellitus with other specified complication, with long-term current use of insulin ([facility])  POCT glycosylated hemoglobin (Hb A1C)    SITagliptin (JANUVIA) 100 MG tablet    Basic Metabolic Panel    Hemoglobin A1C    Lipid Panel    insulin glargine (LANTUS SOLOSTAR) 100 UNIT/ML injection pen    losartan (COZAAR) 25 MG tablet   2. Essential hypertension  carvedilol (COREG) 3.125 MG tablet    losartan (COZAAR) 25 MG tablet   3.  Chronic obstructive pulmonary disease, unspecified COPD type ([facility] [address]     4. Need for Tdap vaccination  Tdap (age 6y and older) IM ([address])     We will add sitagliptin 100 mg daily. Add losartan 25 mg daily for kidney protection. Continue Lantus at 24 units twice daily per patient choice. Continue Metformin. Continue all other medications. Continue to follow through with diabetic education. He will call readings weekly so we can adjust Lantus. We will see him back in 3 months with labs. 1.  Britney Morrison received counseling on the following healthy behaviors: nutrition, exercise and medication adherence  2. Patient given educational materials - see patient instructions  3. Was a self-tracking handout given in paper form or via [employer]? No  If yes, see orders or list here. 4.  Discussed use, benefit, and side effects of prescribed medications. Barriers to medication compliance addressed. All patient questions answered. Pt voiced understanding. 5.  Reviewed prior labs and health maintenance  6. Continue current medications, diet and exercise. Completed Refills   Requested Prescriptions     Signed Prescriptions Disp Refills    carvedilol (COREG) 3.125 MG tablet 180 tablet 3     Sig: Take 1 tablet by mouth 2 times daily    furosemide (LASIX) 40 MG tablet 90 tablet 3     Sig: Take 1 tablet by mouth once daily    SITagliptin (JANUVIA) 100 MG tablet 90 tablet 3     Sig: Take 1 tablet by mouth daily    insulin glargine (LANTUS SOLOSTAR) 100 UNIT/ML injection pen 5 pen 5     Sig: Inject 24 Units into the skin 2 times daily    losartan (COZAAR) 25 MG tablet 90 tablet 3     Sig: Take 1 tablet by mouth daily         Return in about 3 months (around 9/9/2021) for Check up.

## 2021-06-10 ENCOUNTER — TELEPHONE (OUTPATIENT)
Dept: PRIMARY CARE CLINIC | Age: 56
End: 2021-06-10

## 2021-06-10 NOTE — TELEPHONE ENCOUNTER
Called and spoke with Adwoa Penaloza re: below's note. States he has never been on losartan and that his BP was normal. Informed patient that Maura Gomes started him on losartan 25mg daily to help protect his kidneys due to being on Metformin. Verbalizes understanding.

## 2021-06-14 ENCOUNTER — TELEPHONE (OUTPATIENT)
Dept: PRIMARY CARE CLINIC | Age: 56
End: 2021-06-14

## 2021-06-14 NOTE — TELEPHONE ENCOUNTER
----- Message from [employer] sent at 6/11/2021  4:00 PM EDT -----  Subject: Message to Provider    QUESTIONS  Information for Provider? Patient would like to inform Macho Albarran that his   gastro doctor has taken him off of Furosemide 40mg and   triamterene-hydroCHLOROthiazide (MAXZIDE-25) 37.5-25 MG for the time   being, states it is affecting his kidneys. Patient is going Monday to have   more labs done for further testing. Any questions the patient can be   reached at [phone]  ---------------------------------------------------------------------------  --------------  [address]  What is the best way for the office to contact you? OK to leave message on   voicemail  Preferred Call Back Phone Number? [phone]  ---------------------------------------------------------------------------  --------------  SCRIPT ANSWERS  Relationship to Patient?  Self

## 2021-06-14 NOTE — TELEPHONE ENCOUNTER
Tejal Adams called with his blood sugar record for the week:    6/10 137  6/11 175  6/12 150  6/13 128  6/14 133    BP has been good averaging 125-110 over 79-60

## 2021-06-14 NOTE — TELEPHONE ENCOUNTER
----- Message from Alayna Levine sent at 6/11/2021  4:00 PM EDT -----  Subject: Message to Provider    QUESTIONS  Information for Provider? Patient would like to inform Bonnita Eisenmenger that his   gastro doctor has taken him off of Furosemide 40mg and   triamterene-hydroCHLOROthiazide (MAXZIDE-25) 37.5-25 MG for the time   being, states it is affecting his kidneys. Patient is going Monday to have   more labs done for further testing. Any questions the patient can be   reached at [phone]  ---------------------------------------------------------------------------  --------------  [address]  What is the best way for the office to contact you? OK to leave message on   voicemail  Preferred Call Back Phone Number? [phone]  ---------------------------------------------------------------------------  --------------  SCRIPT ANSWERS  Relationship to Patient?  Self

## 2021-06-15 ENCOUNTER — TELEPHONE (OUTPATIENT)
Dept: PRIMARY CARE CLINIC | Age: 56
End: 2021-06-15

## 2021-06-23 DIAGNOSIS — F51.01 PRIMARY INSOMNIA: ICD-10-CM

## 2021-06-23 RX ORDER — CALCIUM CITRATE/VITAMIN D3 200MG-6.25
TABLET ORAL
Qty: 100 EACH | Refills: 5 | Status: SHIPPED | OUTPATIENT
Start: 2021-06-23 | End: 2022-07-14

## 2021-06-23 RX ORDER — ZOLPIDEM TARTRATE 10 MG/1
10 TABLET ORAL NIGHTLY PRN
Qty: 30 TABLET | Refills: 2 | Status: SHIPPED | OUTPATIENT
Start: 2021-06-23 | End: 2021-09-24 | Stop reason: SDUPTHER

## 2021-06-23 NOTE — TELEPHONE ENCOUNTER
Health Maintenance   Topic Date Due    Colon Cancer Screen FIT/FOBT  03/13/2021    Hepatitis A vaccine (1 of 2 - Risk 2-dose series) 11/07/2021 (Originally 4/24/1966)    Hepatitis B vaccine (1 of 3 - Risk 3-dose series) 05/07/2022 (Originally 4/24/1983)    Shingles Vaccine (1 of 2) 05/07/2022 (Originally 4/24/2014)    HIV screen  05/07/2022 (Originally 4/24/1979)    Diabetic foot exam  06/09/2022 (Originally 4/24/1974)    Diabetic retinal exam  06/09/2022 (Originally 4/24/1974)    Pneumococcal 0-64 years Vaccine (1 of 2 - PPSV23) 06/09/2022 (Originally 4/24/1970)    A1C test (Diabetic or Prediabetic)  09/09/2021    Lipid screen  05/10/2022    Diabetic microalbuminuria test  05/11/2022    Potassium monitoring  06/14/2022    Creatinine monitoring  06/14/2022    DTaP/Tdap/Td vaccine (2 - Td or Tdap) 06/09/2031    Flu vaccine  Completed    COVID-19 Vaccine  Completed    Hepatitis C screen  Completed    Hib vaccine  Aged Out    Meningococcal (ACWY) vaccine  Aged Out             (applicable per patient's age: Cancer Screenings, Depression Screening, Fall Risk Screening, Immunizations)    Hemoglobin A1C (%)   Date Value   06/09/2021 12.8   05/07/2021 14     Microalb/Crt.  Ratio (mcg/mg creat)   Date Value   05/11/2021 CANNOT BE CALCULATED     LDL Cholesterol (mg/dL)   Date Value   05/10/2021          AST (U/L)   Date Value   05/10/2021 35     ALT (U/L)   Date Value   05/10/2021 24     BUN (mg/dL)   Date Value   05/10/2021 20      (goal A1C is < 7)   (goal LDL is <100) need 30-50% reduction from baseline     BP Readings from Last 3 Encounters:   06/09/21 128/74   06/08/21 111/60   05/07/21 124/84    (goal /80)      All Future Testing planned in [facility]:  Lab Frequency Next Occurrence   ESOPHAGOSCOPY / EGD Once 89/76/1408   Basic Metabolic Panel Once 30/44/0232   Hemoglobin A1C Once 09/07/2021   Lipid Panel Once 09/07/2021   POCT Fecal Immunochemical Test (FIT) Once 06/30/2021       Next Visit Date:  Future Appointments   Date Time Provider Vinayak Cornell   9/9/2021 11:20 AM ADRIAN Son - CNP [facility]            Patient Active Problem List:     Liver disease     Chronic obstructive pulmonary disease, unspecified COPD type ([facility]     Diabetes mellitus ([facility]     Essential hypertension

## 2021-07-08 DIAGNOSIS — E11.69 TYPE 2 DIABETES MELLITUS WITH OTHER SPECIFIED COMPLICATION, UNSPECIFIED WHETHER LONG TERM INSULIN USE (HCC): ICD-10-CM

## 2021-07-15 DIAGNOSIS — E11.69 TYPE 2 DIABETES MELLITUS WITH OTHER SPECIFIED COMPLICATION, UNSPECIFIED WHETHER LONG TERM INSULIN USE (HCC): ICD-10-CM

## 2021-07-15 NOTE — TELEPHONE ENCOUNTER
Next Occurrence   ESOPHAGOSCOPY / EGD Once 85/33/2676   Basic Metabolic Panel Once 19/57/4993   Hemoglobin A1C Once 09/07/2021   Lipid Panel Once 09/07/2021   POCT Fecal Immunochemical Test (FIT) Once 06/30/2021       Next Visit Date:  Future Appointments   Date Time Provider Vinayak Cornlel   7/22/2021  3:00 PM ADRIAN Gamez CNP [facility]   9/9/2021 11:20 AM ADRIAN Aly CNP [facility]            Patient Active Problem List:     Liver disease     Chronic obstructive pulmonary disease, unspecified COPD type ([facility] [address]     Diabetes mellitus ([address]     Essential hypertension

## 2021-07-20 ENCOUNTER — TELEPHONE (OUTPATIENT)
Dept: PRIMARY CARE CLINIC | Age: 56
End: 2021-07-20

## 2021-07-23 ENCOUNTER — TELEPHONE (OUTPATIENT)
Dept: PRIMARY CARE CLINIC | Age: 56
End: 2021-07-23

## 2021-07-23 DIAGNOSIS — Z12.11 SCREEN FOR COLON CANCER: ICD-10-CM

## 2021-07-23 DIAGNOSIS — R19.5 POSITIVE FIT (FECAL IMMUNOCHEMICAL TEST): Primary | ICD-10-CM

## 2021-07-23 LAB
CONTROL: YES
HEMOCCULT STL QL: POSITIVE

## 2021-07-23 PROCEDURE — 82274 ASSAY TEST FOR BLOOD FECAL: CPT | Performed by: NURSE PRACTITIONER

## 2021-07-23 NOTE — TELEPHONE ENCOUNTER
----- Message from [address] [place] [address], APRN - CNP sent at 7/23/2021 11:31 AM EDT -----  Leann Or to Dr. Jeanette Palacios due to positive fit test.  Thank you.

## 2021-07-23 NOTE — TELEPHONE ENCOUNTER
Called patient and informed him that his OC FIT test was positive for blood and that [facility] was referring him to Dr Ev Roth for colonoscopy and that office will be calling him to schedule colonoscopy. Verbalizes understanding.

## 2021-07-28 ENCOUNTER — TELEPHONE (OUTPATIENT)
Dept: SURGERY | Age: 56
End: 2021-07-28

## 2021-08-06 ENCOUNTER — OFFICE VISIT (OUTPATIENT)
Dept: PRIMARY CARE CLINIC | Age: 56
End: 2021-08-06
Payer: MEDICAID

## 2021-08-06 VITALS
SYSTOLIC BLOOD PRESSURE: 132 MMHG | BODY MASS INDEX: 38.8 KG/M2 | WEIGHT: 286.5 LBS | OXYGEN SATURATION: 91 % | TEMPERATURE: 97.8 F | DIASTOLIC BLOOD PRESSURE: 82 MMHG | RESPIRATION RATE: 18 BRPM | HEART RATE: 73 BPM | HEIGHT: 72 IN

## 2021-08-06 DIAGNOSIS — H60.332 ACUTE SWIMMER'S EAR OF LEFT SIDE: ICD-10-CM

## 2021-08-06 DIAGNOSIS — H61.22 HEARING LOSS DUE TO CERUMEN IMPACTION, LEFT: Primary | ICD-10-CM

## 2021-08-06 PROCEDURE — 99213 OFFICE O/P EST LOW 20 MIN: CPT | Performed by: NURSE PRACTITIONER

## 2021-08-06 PROCEDURE — 3017F COLORECTAL CA SCREEN DOC REV: CPT | Performed by: NURSE PRACTITIONER

## 2021-08-06 PROCEDURE — 4130F TOPICAL PREP RX AOE: CPT | Performed by: NURSE PRACTITIONER

## 2021-08-06 PROCEDURE — G8417 CALC BMI ABV UP PARAM F/U: HCPCS | Performed by: NURSE PRACTITIONER

## 2021-08-06 PROCEDURE — 4004F PT TOBACCO SCREEN RCVD TLK: CPT | Performed by: NURSE PRACTITIONER

## 2021-08-06 PROCEDURE — 69210 REMOVE IMPACTED EAR WAX UNI: CPT | Performed by: NURSE PRACTITIONER

## 2021-08-06 PROCEDURE — G8427 DOCREV CUR MEDS BY ELIG CLIN: HCPCS | Performed by: NURSE PRACTITIONER

## 2021-08-06 RX ORDER — COLISTIN SULFATE, NEOMYCIN SULFATE, THONZONIUM BROMIDE AND HYDROCORTISONE ACETATE 3; 3.3; .5; 1 MG/ML; MG/ML; MG/ML; MG/ML
3 SUSPENSION AURICULAR (OTIC) 3 TIMES DAILY
Qty: 1 BOTTLE | Refills: 0 | Status: SHIPPED | OUTPATIENT
Start: 2021-08-06 | End: 2021-08-10

## 2021-08-06 ASSESSMENT — ENCOUNTER SYMPTOMS
COUGH: 0
DIARRHEA: 0
VOMITING: 0
NAUSEA: 0
RHINORRHEA: 0
SINUS PAIN: 0
ABDOMINAL PAIN: 0
SORE THROAT: 0

## 2021-08-06 NOTE — PROGRESS NOTES
Name: Eugene Rivero  : 1965         Chief Complaint:     Chief Complaint   Patient presents with    Ear Problem     x 1 month. c/o left ear pain \"feels like its full of fluid. \"       History of Present Illness:      Eugene Rivero is a 61 y.o.  male who presents with Ear Problem (x 1 month. c/o left ear pain \"feels like its full of fluid. \")      Elena Farnsworth is here today for a routine office visit. Ear Fullness   There is pain in the left ear. This is a new problem. The current episode started in the past 7 days. The problem occurs constantly. The problem has been unchanged. There has been no fever. The pain is at a severity of 3/10. The pain is mild. Associated symptoms include hearing loss. Pertinent negatives include no abdominal pain, coughing, diarrhea, ear discharge, headaches, neck pain, rash, rhinorrhea, sore throat or vomiting. He has tried ear drops for the symptoms. The treatment provided mild relief. There is no history of a chronic ear infection, hearing loss or a tympanostomy tube. Past Medical History:     Past Medical History:   Diagnosis Date    COPD (chronic obstructive pulmonary disease) ([facility] [address]     Hypertension     Liver disease       Reviewed all health maintenance requirements and ordered appropriate tests  Health Maintenance Due   Topic Date Due    Low dose CT lung screening  2021       Past Surgical History:     Past Surgical History:   Procedure Laterality Date    ANKLE SURGERY      CARPAL TUNNEL RELEASE Bilateral     HERNIA REPAIR      KNEE ARTHROSCOPY      x's 5    NECK SURGERY          Medications:       Prior to Admission medications    Medication Sig Start Date End Date Taking?  Authorizing Provider   neomycin-colistin-hydrocortisone-thonzonium (CORTISPORIN-TC) 3.3-3-10-0.5 MG/ML otic suspension Place 3 drops in ear(s) 3 times daily for 10 days 21 Yes ADRIAN Holland - CNP   Insulin Pen Needle 31G X 6 MM MISC 1 each by Does not apply route 2 times daily 7/15/21  Yes ADRIAN Monteiro CNP   zolpidem (AMBIEN) 10 MG tablet Take 1 tablet by mouth nightly as needed for Sleep for up to 90 days. 6/23/21 9/21/21 Yes ADRIAN Monteiro CNP   TRUE METRIX BLOOD GLUCOSE TEST strip USE 1 STRIP TO CHECK GLUCOSE 6/23/21  Yes ADRIAN Monteiro CNP   carvedilol (COREG) 3.125 MG tablet Take 1 tablet by mouth 2 times daily 6/9/21  Yes ADRIAN Monteiro CNP   furosemide (LASIX) 40 MG tablet Take 1 tablet by mouth once daily 6/9/21  Yes ADRIAN Monteiro CNP   SITagliptin (JANUVIA) 100 MG tablet Take 1 tablet by mouth daily 6/9/21  Yes ADRIAN Monteiro CNP   insulin glargine (LANTUS SOLOSTAR) 100 UNIT/ML injection pen Inject 24 Units into the skin 2 times daily 6/9/21  Yes ADRIAN Monteiro CNP   losartan (COZAAR) 25 MG tablet Take 1 tablet by mouth daily 6/9/21  Yes ADRIAN Monteiro CNP   atorvastatin (LIPITOR) 40 MG tablet Take 1 tablet by mouth daily 5/11/21  Yes ADRIAN Lassiter CNP   TRUEplus Lancets 30G MISC USE 1 TO CHECK GLUCOSE 5/7/21  Yes Historical Provider, MD   Blood Glucose Monitoring Suppl (TRUE METRIX AIR GLUCOSE METER) w/Device KIT USE TO CHECK GLUCOSE DAILY 5/7/21  Yes Historical Provider, MD   metFORMIN (GLUCOPHAGE-XR) 500 MG extended release tablet Take 2 tablets by mouth daily (with breakfast) 5/7/21  Yes ADRIAN Lassiter CNP   blood glucose monitor kit and supplies Dispense sufficient amount for indicated testing frequency plus additional to accommodate PRN testing needs. Dispense all needed supplies to include: monitor, strips, lancing device, lancets, control solutions, alcohol swabs.  5/7/21  Yes ADRIAN Lassiter CNP   albuterol (PROVENTIL) (2.5 MG/3ML) 0.083% nebulizer solution USE 1 VIAL IN NEBULIZER EVERY 6 HOURS AS NEEDED FOR WHEEZING 2/22/21  Yes Delmy Segundo, APRN - MARINO   SYMBICORT 160-4.5 MCG/ACT AERO Inhale 2 puffs by mouth twice daily 2/22/21  Yes Delmy Segundo, APRN - CNP triamterene-hydroCHLOROthiazide (MAXZIDE-25) 37.5-25 MG per tablet Take 1 tablet by mouth daily 12/28/20  Yes [facility] ADRIAN Segundo CNP   Respiratory Therapy Supplies (NEBULIZER/TUBING/MOUTHPIECE) KIT 1 kit by Does not apply route daily as needed (SOB/Wheezing) 12/18/20  Yes [facility] ADRIAN Segundo CNP   Nebulizers (COMPRESSOR/NEBULIZER) MISC 1 each by Does not apply route daily as needed (SOB/Wheezing) 12/18/20  Yes [facility] ADRIAN Segundo CNP   buPROPion (WELLBUTRIN XL) 150 MG extended release tablet TAKE 1 TABLET BY MOUTH ONCE DAILY IN THE MORNING 9/11/20  Yes [facility] ADRIAN Segundo CNP   ammonium lactate (AMLACTIN) 12 % cream  2/17/20  Yes Historical Provider, MD   Colchicine (COLCRYS PO) Take 1 tablet by mouth 2 times daily    Yes Historical Provider, MD   nicotine (NICODERM CQ) 14 MG/24HR Place 1 patch onto the skin every 24 hours  Patient not taking: Reported on 6/9/2021 3/30/20 3/30/21  [facility] ADRIAN Segundo CNP   oxymetazoline (AFRIN) 0.05 % nasal spray 2 sprays by Nasal route 2 times daily  Patient not taking: Reported on 6/8/2021    Historical Provider, MD        Allergies:       Pcn [penicillins]    Social History:     Tobacco:    reports that he has been smoking cigarettes. He has a 21.00 pack-year smoking history. He has never used smokeless tobacco.  Alcohol:      reports previous alcohol use. Drug Use:  reports current drug use. Drug: Marijuana. Family History:     Family History   Problem Relation Age of Onset    Allergies Mother     Diabetes Father     Esophageal Cancer Father        Review of Systems:     Positive and Negative as described in HPI    Review of Systems   Constitutional: Negative for chills, fatigue and fever. HENT: Positive for hearing loss. Negative for congestion, ear discharge, postnasal drip, rhinorrhea, sinus pain and sore throat. Respiratory: Negative for cough. Gastrointestinal: Negative for abdominal pain, diarrhea, nausea and vomiting.    Musculoskeletal: Negative for neck pain. Skin: Negative for rash. Neurological: Negative for dizziness, light-headedness and headaches. Physical Exam:   Vitals:  /82 (Site: Left Upper Arm, Position: Sitting, Cuff Size: Large Adult)   Pulse 73   Temp 97.8 °F (36.6 °C) (Temporal)   Resp 18   Ht 6' (1.829 m)   Wt 286 lb 8 oz (130 kg)   SpO2 91%   BMI 38.86 kg/m²     Physical Exam  Vitals and nursing note reviewed. Constitutional:       Appearance: Normal appearance. HENT:      Right Ear: Tympanic membrane normal. There is no impacted cerumen. Left Ear: Tympanic membrane normal. Swelling and tenderness present. There is impacted cerumen. Ears:      Comments: Left canal: Ceruminosis is noted. Wax is removed by syringing and manual debridement. Instructions for home care to prevent wax buildup are given. Mouth/Throat:      Mouth: Mucous membranes are moist.   Eyes:      Conjunctiva/sclera: Conjunctivae normal.   Cardiovascular:      Rate and Rhythm: Normal rate and regular rhythm. Pulmonary:      Effort: Pulmonary effort is normal.      Breath sounds: Normal breath sounds. Skin:     General: Skin is warm and dry. Findings: No rash. Neurological:      Mental Status: He is alert.          Data:     Lab Results   Component Value Date     05/10/2021    K 4.3 05/10/2021    CL 91 05/10/2021    CO2 28 05/10/2021    BUN 20 05/10/2021    CREATININE 1.10 05/10/2021    GLUCOSE 348 05/10/2021    PROT 7.5 05/10/2021    LABALBU 3.8 05/10/2021    BILITOT 0.45 05/10/2021    ALKPHOS 127 05/10/2021    AST 35 05/10/2021    ALT 24 05/10/2021     Lab Results   Component Value Date    WBC 10.6 05/10/2021    RBC 5.52 05/10/2021    HGB 17.4 05/10/2021    HCT 53.3 05/10/2021    MCV 96.6 05/10/2021    MCH 31.5 05/10/2021    MCHC 32.6 05/10/2021    RDW 14.1 05/10/2021     05/10/2021    MPV 11.1 05/10/2021     Lab Results   Component Value Date    TSH 1.97 05/10/2021     Lab Results   Component Value Date    CHOL 229 05/10/2021    HDL 23 05/10/2021    LABA1C 12.8 06/09/2021       Assessment/Plan:      Diagnosis Orders   1. Hearing loss due to cerumen impaction, left     2. Acute swimmer's ear of left side  neomycin-colistin-hydrocortisone-thonzonium (CORTISPORIN-TC) 3.3-3-10-0.5 MG/ML otic suspension       1.  Heaven Mack received counseling on the following healthy behaviors: nutrition, exercise and medication adherence  2. Patient given educational materials - see patient instructions  3. Was a self-tracking handout given in paper form or via [employer]? No  If yes, see orders or list here. 4.  Discussed use, benefit, and side effects of prescribed medications. Barriers to medication compliance addressed. All patient questions answered. Pt voiced understanding. 5.  Reviewed prior labs and health maintenance  6. Continue current medications, diet and exercise. Completed Refills   Requested Prescriptions     Signed Prescriptions Disp Refills    neomycin-colistin-hydrocortisone-thonzonium (CORTISPORIN-TC) 3.3-3-10-0.5 MG/ML otic suspension 1 Bottle 0     Sig: Place 3 drops in ear(s) 3 times daily for 10 days         Return if symptoms worsen or fail to improve.

## 2021-08-06 NOTE — PATIENT INSTRUCTIONS
SURVEY:    You may be receiving a survey from [employer] regarding your visit today. Please complete the survey to enable us to provide the highest quality of care to you and your family. If you cannot score us a very good on any question, please call the office to discuss how we could have made your experience a very good one. Thank you.   Scottie Segundo, APRN-MARINO Sharma, APRN-CNP  ANNIE Miles LPN [place], [place] Winslow Specter, MA Pam, PCA

## 2021-08-10 ENCOUNTER — TELEPHONE (OUTPATIENT)
Dept: PRIMARY CARE CLINIC | Age: 56
End: 2021-08-10

## 2021-08-10 RX ORDER — NEOMYCIN SULFATE, POLYMYXIN B SULFATE, HYDROCORTISONE 3.5; 10000; 1 MG/ML; [USP'U]/ML; MG/ML
2 SOLUTION/ DROPS AURICULAR (OTIC) EVERY 8 HOURS SCHEDULED
Qty: 1 BOTTLE | Refills: 0 | Status: SHIPPED | OUTPATIENT
Start: 2021-08-10 | End: 2021-08-20

## 2021-08-10 NOTE — TELEPHONE ENCOUNTER
Patient states insurance would not cover his ABX drops from Friday 8/6/21 and is requesting a new rx.  Please advise, thank you

## 2021-08-20 DIAGNOSIS — J44.9 CHRONIC OBSTRUCTIVE PULMONARY DISEASE, UNSPECIFIED COPD TYPE (HCC): ICD-10-CM

## 2021-08-20 RX ORDER — BUDESONIDE AND FORMOTEROL FUMARATE DIHYDRATE 160; 4.5 UG/1; UG/1
AEROSOL RESPIRATORY (INHALATION)
Qty: 11 G | Refills: 0 | Status: SHIPPED | OUTPATIENT
Start: 2021-08-20 | End: 2021-09-24 | Stop reason: SDUPTHER

## 2021-08-20 NOTE — TELEPHONE ENCOUNTER
Health Maintenance   Topic Date Due    Low dose CT lung screening  06/22/2021    Hepatitis A vaccine (1 of 2 - Risk 2-dose series) 11/07/2021 (Originally 4/24/1966)    Hepatitis B vaccine (1 of 3 - Risk 3-dose series) 05/07/2022 (Originally 4/24/1983)    Shingles Vaccine (1 of 2) 05/07/2022 (Originally 4/24/2014)    HIV screen  05/07/2022 (Originally 4/24/1979)    Diabetic foot exam  06/09/2022 (Originally 4/24/1974)    Diabetic retinal exam  06/09/2022 (Originally 4/24/1974)    Pneumococcal 0-64 years Vaccine (1 of 2 - PPSV23) 06/09/2022 (Originally 4/24/1970)    Flu vaccine (1) 09/01/2021    A1C test (Diabetic or Prediabetic)  09/09/2021    Lipid screen  05/10/2022    Diabetic microalbuminuria test  05/11/2022    Potassium monitoring  06/14/2022    Creatinine monitoring  06/14/2022    Colon Cancer Screen FIT/FOBT  07/23/2022    DTaP/Tdap/Td vaccine (2 - Td or Tdap) 06/09/2031    COVID-19 Vaccine  Completed    Hepatitis C screen  Completed    Hib vaccine  Aged Out    Meningococcal (ACWY) vaccine  Aged Out             (applicable per patient's age: Cancer Screenings, Depression Screening, Fall Risk Screening, Immunizations)    Hemoglobin A1C (%)   Date Value   06/09/2021 12.8   05/07/2021 14     Microalb/Crt.  Ratio (mcg/mg creat)   Date Value   05/11/2021 CANNOT BE CALCULATED     LDL Cholesterol (mg/dL)   Date Value   05/10/2021          AST (U/L)   Date Value   05/10/2021 35     ALT (U/L)   Date Value   05/10/2021 24     BUN (mg/dL)   Date Value   05/10/2021 20      (goal A1C is < 7)   (goal LDL is <100) need 30-50% reduction from baseline     BP Readings from Last 3 Encounters:   08/06/21 132/82   06/09/21 128/74   06/08/21 111/60    (goal /80)      All Future Testing planned in [facility]:  Lab Frequency Next Occurrence   ESOPHAGOSCOPY / EGD Once 48/74/7526   Basic Metabolic Panel Once 50/98/8267   Hemoglobin A1C Once 09/07/2021   Lipid Panel Once 09/07/2021       Next Visit Date:  Future Appointments   Date Time Provider Vinayak Cornell   9/9/2021 11:20 AM Hillary Segundo, APRN - CNP [facility]   12/8/2021  3:30 PM Vipul Dumont DO [facility]            Patient Active Problem List:     Liver disease     Chronic obstructive pulmonary disease, unspecified COPD type ([facility] [address]     Diabetes mellitus ([facility] [address]     Essential hypertension

## 2021-08-31 ENCOUNTER — TELEPHONE (OUTPATIENT)
Dept: ONCOLOGY | Age: 56
End: 2021-08-31

## 2021-08-31 DIAGNOSIS — Z87.891 PERSONAL HISTORY OF NICOTINE DEPENDENCE: Primary | ICD-10-CM

## 2021-09-15 ENCOUNTER — TELEPHONE (OUTPATIENT)
Dept: PRIMARY CARE CLINIC | Age: 56
End: 2021-09-15

## 2021-09-15 ENCOUNTER — HOSPITAL ENCOUNTER (OUTPATIENT)
Dept: CT IMAGING | Age: 56
Discharge: HOME | End: 2021-09-17
Payer: MEDICAID

## 2021-09-15 DIAGNOSIS — Z87.891 PERSONAL HISTORY OF NICOTINE DEPENDENCE: ICD-10-CM

## 2021-09-15 DIAGNOSIS — R91.1 INCIDENTAL PULMONARY NODULE, GREATER THAN OR EQUAL TO 8MM: ICD-10-CM

## 2021-09-15 DIAGNOSIS — R68.89 SUSPECTED MALIGNANT NEOPLASM OF LUNG: Primary | ICD-10-CM

## 2021-09-15 PROCEDURE — 71271 CT THORAX LUNG CANCER SCR C-: CPT

## 2021-09-15 NOTE — TELEPHONE ENCOUNTER
----- Message from [address] [place] [address], ADRIAN - CNP sent at 9/15/2021  1:31 PM EDT -----  Patient has a nodule in the lung that has enlarged. Needs to be seen by pulmonology, referral placed. Needs PET scan also. That is ordered.

## 2021-09-15 NOTE — TELEPHONE ENCOUNTER
Contacted patient in regards to lab results. Provided phone number to scheduling. Patient verbalized understanding.

## 2021-09-20 ENCOUNTER — TELEPHONE (OUTPATIENT)
Dept: CASE MANAGEMENT | Age: 56
End: 2021-09-20

## 2021-09-20 NOTE — TELEPHONE ENCOUNTER
Lung Navigator reviewing chart and recent Lung Screening and pt. Needing additional F/U . PET ordered and Pulmonary F/U scheduled for 10/4 in [place]. Plan to follow.

## 2021-09-22 ENCOUNTER — TELEPHONE (OUTPATIENT)
Dept: CASE MANAGEMENT | Age: 56
End: 2021-09-22

## 2021-09-22 NOTE — TELEPHONE ENCOUNTER
Lung Navigator checking with pt. In regards to PET scheduling. Pt. Sharing the scan was rescheduled for 10/4 in hopes to have authorization at that time.

## 2021-09-24 DIAGNOSIS — F51.01 PRIMARY INSOMNIA: ICD-10-CM

## 2021-09-24 DIAGNOSIS — J44.9 CHRONIC OBSTRUCTIVE PULMONARY DISEASE, UNSPECIFIED COPD TYPE (HCC): ICD-10-CM

## 2021-09-24 RX ORDER — BUDESONIDE AND FORMOTEROL FUMARATE DIHYDRATE 160; 4.5 UG/1; UG/1
AEROSOL RESPIRATORY (INHALATION)
Qty: 11 G | Refills: 5 | Status: SHIPPED | OUTPATIENT
Start: 2021-09-24 | End: 2022-03-02

## 2021-09-24 RX ORDER — ZOLPIDEM TARTRATE 10 MG/1
10 TABLET ORAL NIGHTLY PRN
Qty: 30 TABLET | Refills: 2 | Status: SHIPPED | OUTPATIENT
Start: 2021-09-24 | End: 2022-01-27 | Stop reason: SDUPTHER

## 2021-09-24 NOTE — TELEPHONE ENCOUNTER
Controlled Substance Monitoring:    Acute and Chronic Pain Monitoring:   RX Monitoring 9/24/2021   Periodic Controlled Substance Monitoring No signs of potential drug abuse or diversion identified.

## 2021-09-24 NOTE — TELEPHONE ENCOUNTER
Health Maintenance   Topic Date Due    Flu vaccine (1) 09/01/2021    A1C test (Diabetic or Prediabetic)  09/09/2021    Hepatitis A vaccine (1 of 2 - Risk 2-dose series) 11/07/2021 (Originally 4/24/1966)    Hepatitis B vaccine (1 of 3 - Risk 3-dose series) 05/07/2022 (Originally 4/24/1983)    Shingles Vaccine (1 of 2) 05/07/2022 (Originally 4/24/2014)    HIV screen  05/07/2022 (Originally 4/24/1979)    Diabetic foot exam  06/09/2022 (Originally 4/24/1974)    Diabetic retinal exam  06/09/2022 (Originally 4/24/1974)    Pneumococcal 0-64 years Vaccine (1 of 2 - PPSV23) 06/09/2022 (Originally 4/24/1970)    Lipid screen  05/10/2022    Diabetic microalbuminuria test  05/11/2022    Potassium monitoring  06/14/2022    Creatinine monitoring  06/14/2022    Colon Cancer Screen FIT/FOBT  07/23/2022    Low dose CT lung screening  09/15/2022    DTaP/Tdap/Td vaccine (2 - Td or Tdap) 06/09/2031    COVID-19 Vaccine  Completed    Hepatitis C screen  Completed    Hib vaccine  Aged Out    Meningococcal (ACWY) vaccine  Aged Out             (applicable per patient's age: Cancer Screenings, Depression Screening, Fall Risk Screening, Immunizations)    Hemoglobin A1C (%)   Date Value   06/09/2021 12.8   05/07/2021 14     Microalb/Crt.  Ratio (mcg/mg creat)   Date Value   05/11/2021 CANNOT BE CALCULATED     LDL Cholesterol (mg/dL)   Date Value   05/10/2021          AST (U/L)   Date Value   05/10/2021 35     ALT (U/L)   Date Value   05/10/2021 24     BUN (mg/dL)   Date Value   05/10/2021 20      (goal A1C is < 7)   (goal LDL is <100) need 30-50% reduction from baseline     BP Readings from Last 3 Encounters:   08/06/21 132/82   06/09/21 128/74   06/08/21 111/60    (goal /80)      All Future Testing planned in [facility]:  Lab Frequency Next Occurrence   Basic Metabolic Panel Once 55/73/3634   Hemoglobin A1C Once 09/07/2021   Lipid Panel Once 09/07/2021   PET CT SKULL BASE TO MID THIGH Once 09/15/2021       Next Visit Date:  Future Appointments   Date Time Provider Vinayak Aneta   10/4/2021 12:45 PM Thiago Maloney MD [facility]   10/5/2021  3:00 PM ADRIAN Hudson - CNP [facility]   12/8/2021  3:30 PM Shelby Hardin DO [facility]            Patient Active Problem List:     Liver disease     Chronic obstructive pulmonary disease, unspecified COPD type ([facility] [address]     Diabetes mellitus ([facility] [address]     Essential hypertension

## 2021-10-04 ENCOUNTER — OFFICE VISIT (OUTPATIENT)
Dept: PULMONOLOGY | Age: 56
End: 2021-10-04
Payer: MEDICAID

## 2021-10-04 VITALS
HEIGHT: 72 IN | BODY MASS INDEX: 38.09 KG/M2 | WEIGHT: 281.2 LBS | DIASTOLIC BLOOD PRESSURE: 84 MMHG | TEMPERATURE: 98.6 F | HEART RATE: 82 BPM | OXYGEN SATURATION: 93 % | RESPIRATION RATE: 20 BRPM | SYSTOLIC BLOOD PRESSURE: 139 MMHG

## 2021-10-04 DIAGNOSIS — I10 ESSENTIAL HYPERTENSION: ICD-10-CM

## 2021-10-04 DIAGNOSIS — D75.1 ERYTHROCYTOSIS: ICD-10-CM

## 2021-10-04 DIAGNOSIS — R91.8 MULTIPLE NODULES OF LUNG: ICD-10-CM

## 2021-10-04 DIAGNOSIS — G47.33 OSA (OBSTRUCTIVE SLEEP APNEA): ICD-10-CM

## 2021-10-04 DIAGNOSIS — E66.09 OBESITY DUE TO EXCESS CALORIES, UNSPECIFIED OBESITY SEVERITY: ICD-10-CM

## 2021-10-04 DIAGNOSIS — J44.9 CHRONIC OBSTRUCTIVE PULMONARY DISEASE, UNSPECIFIED COPD TYPE (HCC): Primary | ICD-10-CM

## 2021-10-04 PROCEDURE — G8484 FLU IMMUNIZE NO ADMIN: HCPCS | Performed by: INTERNAL MEDICINE

## 2021-10-04 PROCEDURE — 3023F SPIROM DOC REV: CPT | Performed by: INTERNAL MEDICINE

## 2021-10-04 PROCEDURE — G8427 DOCREV CUR MEDS BY ELIG CLIN: HCPCS | Performed by: INTERNAL MEDICINE

## 2021-10-04 PROCEDURE — 3017F COLORECTAL CA SCREEN DOC REV: CPT | Performed by: INTERNAL MEDICINE

## 2021-10-04 PROCEDURE — G8926 SPIRO NO PERF OR DOC: HCPCS | Performed by: INTERNAL MEDICINE

## 2021-10-04 PROCEDURE — 99214 OFFICE O/P EST MOD 30 MIN: CPT | Performed by: INTERNAL MEDICINE

## 2021-10-04 PROCEDURE — G8417 CALC BMI ABV UP PARAM F/U: HCPCS | Performed by: INTERNAL MEDICINE

## 2021-10-04 PROCEDURE — 4004F PT TOBACCO SCREEN RCVD TLK: CPT | Performed by: INTERNAL MEDICINE

## 2021-10-04 NOTE — PROGRESS NOTES
PULMONARY OP CONSULTATION          REFERRED BY: ADRIAN Call CNP    REASON FOR CONSULTATION: Fatigue and tiredness in the daytime with possible sleep apnea in a patient with COPD who was a smoker with erythrocytosis and hypoxemia and is on home oxygen    Patient is being seen in follow-up for-  1. Chronic obstructive pulmonary disease, unspecified COPD type ([address]    2. Obesity due to excess calories, unspecified obesity severity    3. Essential hypertension    4. ADE (obstructive sleep apnea)    5. Multiple nodules of lung    6. Erythrocytosis          HISTORY OF PRESENT ILLNESS:    Shannan Luz is a 61y.o. year old male here for evaluation of above problems  Has daytime fatigue and tiredness and sleepiness but denies any witnessed snoring  No witnessed apnea  His sleep is not the best quality  He feels unrefreshed upon awakening in the morning  Patient has not had a sleep study done yet  He is not ready for it yet.   He claims his sleep habits makes it harder to do a sleep study  He was recommended to have a home sleep study at least  He is not ready for it either  No motor vehicle accidents  No hypothyroidism  No narcolepsy symptoms  Currently denied any shortness of breath or wheezing but he does have it intermittently and is helped with bronchodilators  Not much cough or sputum production  No hemoptysis  No orthopnea or PND or increasing pedal edema  No chest pain or pressure  Patient is a smoker and continues to smoke  No bone pain or any headaches or abdominal pain  Has been using supplemental oxygen during sleep and exertion      PAST MEDICAL HISTORY:       Diagnosis Date    COPD (chronic obstructive pulmonary disease) ([address]     Hypertension     Liver disease        SURGICAL HISTORY:    Past Surgical History:   Procedure Laterality Date    ANKLE SURGERY      CARPAL TUNNEL RELEASE Bilateral     HERNIA REPAIR      KNEE ARTHROSCOPY      x's 5    NECK SURGERY         ALLERGIES: Allergies   Allergen Reactions    Pcn [Penicillins] Rash       MEDICATIONS:   Current Outpatient Medications   Medication Sig Dispense Refill    SYMBICORT 160-4.5 MCG/ACT AERO 1 puff daily 11 g 5    zolpidem (AMBIEN) 10 MG tablet Take 1 tablet by mouth nightly as needed for Sleep for up to 90 days. 30 tablet 2    buPROPion (WELLBUTRIN XL) 150 MG extended release tablet TAKE 1 TABLET BY MOUTH ONCE DAILY IN THE MORNING 90 tablet 3    carvedilol (COREG) 3.125 MG tablet Take 1 tablet by mouth 2 times daily 180 tablet 3    furosemide (LASIX) 40 MG tablet Take 1 tablet by mouth once daily 90 tablet 3    SITagliptin (JANUVIA) 100 MG tablet Take 1 tablet by mouth daily 90 tablet 3    insulin glargine (LANTUS SOLOSTAR) 100 UNIT/ML injection pen Inject 24 Units into the skin 2 times daily 5 pen 5    losartan (COZAAR) 25 MG tablet Take 1 tablet by mouth daily 90 tablet 3    atorvastatin (LIPITOR) 40 MG tablet Take 1 tablet by mouth daily 90 tablet 1    metFORMIN (GLUCOPHAGE-XR) 500 MG extended release tablet Take 2 tablets by mouth daily (with breakfast) 180 tablet 1    albuterol (PROVENTIL) (2.5 MG/3ML) 0.083% nebulizer solution USE 1 VIAL IN NEBULIZER EVERY 6 HOURS AS NEEDED FOR WHEEZING 300 mL 1    triamterene-hydroCHLOROthiazide (MAXZIDE-25) 37.5-25 MG per tablet Take 1 tablet by mouth daily 90 tablet 3    oxymetazoline (AFRIN) 0.05 % nasal spray 2 sprays by Nasal route 2 times daily       Insulin Pen Needle 31G X 6 MM MISC 1 each by Does not apply route 2 times daily 200 each 3    TRUE METRIX BLOOD GLUCOSE TEST strip USE 1 STRIP TO CHECK GLUCOSE 100 each 5    TRUEplus Lancets 30G MISC USE 1 TO CHECK GLUCOSE      Blood Glucose Monitoring Suppl (TRUE METRIX AIR GLUCOSE METER) w/Device KIT USE TO CHECK GLUCOSE DAILY      blood glucose monitor kit and supplies Dispense sufficient amount for indicated testing frequency plus additional to accommodate PRN testing needs.  Dispense all needed supplies to include: monitor, strips, lancing device, lancets, control solutions, alcohol swabs. 1 kit 0    Respiratory Therapy Supplies (NEBULIZER/TUBING/MOUTHPIECE) KIT 1 kit by Does not apply route daily as needed (SOB/Wheezing) 1 kit 0    Nebulizers (COMPRESSOR/NEBULIZER) MISC 1 each by Does not apply route daily as needed (SOB/Wheezing) 1 each 0    nicotine (NICODERM CQ) 14 MG/24HR Place 1 patch onto the skin every 24 hours (Patient not taking: Reported on 6/9/2021) 30 patch 3    ammonium lactate (AMLACTIN) 12 % cream  (Patient not taking: Reported on 10/4/2021)       No current facility-administered medications for this visit. FAMILY HISTORY: family history includes Allergies in his mother; Diabetes in his father; Esophageal Cancer in his father. SOCIAL AND OCCUPATIONAL HEALTH:  The patient is a Past smoker of more than 30 pack years. There  is not history of TB or TB exposure. There is not asbestos or silica dust exposure. The patient reports does not have coal, foundry, quarry or [employer] exposure. Travel history reveals no significant history of risk factors for pulm disease. There is not  history of recreational or IV drug use. The patient does not pets, dogs, cats turtles or exotic birds.         Review of Systems:  Review of Systems -   General ROS: Completed and except as mentioned above were negative   Psychological ROS:  Completed and except as mentioned above were negative  Ophthalmic ROS:  Completed and except as mentioned above were negative  ENT ROS:  Completed and except as mentioned above were negative  Allergy and Immunology ROS:  Completed and except as mentioned above were negative  Hematological and Lymphatic ROS:  Completed and except as mentioned above were negative  Endocrine ROS: Completed and except as mentioned above were negative  Breast ROS:  Completed and except as mentioned above were negative  Respiratory ROS:  Completed and except as mentioned above were uvula visible)  Neck: Supple without thyromegaly. No elevated JVP. Trachea was midline. No carotid bruits were auscultated. Respiratory: Chest was symmetrical without dullness to percussion. Breath sounds bilaterally were clear to auscultation. There were no wheezes, rhonchi or rales. There is no intercostal retraction or use of accessory muscles. No egophony noted. Cardiovascular: Regular without murmur, clicks, gallops or rubs. There is no left or right ventricular heave. Abdomen: Slightly rounded and soft without organomegaly. No rebound, rigidity or guarding was appreciated. Lymphatic: No lymphadenopathy. Musculoskeletal: Normal curvature of the spine. No gross muscle weakness. Extremities:  No lower extremity edema, ulcerations, tenderness, varicosities or erythema. Muscle size, tone and strength are normal.  No involuntary movements are noted. Skin:  Warm and dry. Good color, turgor and pigmentation. No lesions or scars. No cyanosis or clubbing  Neurological/Psychiatric: The patient's general behavior, level of consciousness, thought content and emotional status is normal.                  DATA:     pft's-stage III COPD with an FEV1 of 48%        CXR: REVIEWED: None    CT scan of the chest was done on June 22 of 2020 and it showed-  No new or enlarging pulmonary nodule.         RECOMMENDATIONS:    Per ACR Lung-RADS Version 1.0         Category 2, Benign appearance or behavior.  Management:  Continue annual lung    screening with LDCT in 12 months. (probability of malignancy <1%). CT scan of the chest was done on 7/15/2021 to screen for lung cancer and it showed-    1. Slightly enlarging 9 mm right lower lobe pulmonary nodule, previously   measuring 7 mm. 2. Mild emphysema.  Multifocal parenchymal banding, bibasilar atelectasis and   respiratory motion.    3. Stable 2 mm subpleural left upper lobe pulmonary nodule, unchanged from   06/22/2020.  Stable 3 mm subpleural right middle lobe vaccinations. Patient had the COVID-19 vaccination  Patient is not up-to-date with vaccinations from pulmonary perspective. Maintain an active lifestyle. Continue smoking cessation. All the questions that the patient has had were answered to his satisfaction. Supplemental oxygen was to be continued. Patient was informed of the need for using oxygen. Patient was educated on the importance of compliance and the benefits of oxygen use. Complications of oxygen use, including dryness of the nostrils, epistaxis and also the fire hazard were explained to the patient. Patient willingly accepts to use  the oxygen as recommended. Pulmonary function tests were reviewed  CT scan of the chest was reviewed  Pt met the criteria for lung cancer screening and was explained the possibility of having findings that would need monitoring such as lung nodules and the need for more than yearly screening ct chest. Pt acknowledges understanding and questions answered to their satisfaction. Polysomnogram with CPAP/BiPAP titration if needed, when the patient agrees to. Patient will think about it  Weight loss was recommended and discussed. Recommended following good sleep hygiene instructions. Explained importance of compliance with treatment of sleep apnea. Pt is not to drive if sleepy. We'll see the patient back in 3 months or earlier if needed based on findings on the PET scan of the chest.  Patient will call us if he is sick, so he can be seen sooner. Thank you for having us involved in the care of your patient. Please call us if you have any questions or concerns. Cierra Goode MD MD  10/4/2021 2:05 PM    Patient had the CT scan reviewed with radiologist and an addendum was made by the radiologist opined that the lung nodule has remained stable for almost a year and half and likely the changes seen on the recent CT scan could be related to technique rather than actual increase in the size.   Based on that, patient had a CT scan of the chest ordered for March 2022 and will follow him in the office and this was informed to the nurse navigator and also the patient.     Electronically signed Daneil Hodge MD  10/27/2021 8:35 AM

## 2021-10-04 NOTE — PATIENT INSTRUCTIONS
SURVEY:    You may be receiving a survey from [employer] regarding your visit today. Please complete the survey to enable us to provide the highest quality of care to you and your family. If you cannot score us a very good on any question, please call the office to discuss how we could have made your experience a very good one. Thank you.

## 2021-10-05 ENCOUNTER — TELEPHONE (OUTPATIENT)
Dept: CASE MANAGEMENT | Age: 56
End: 2021-10-05

## 2021-10-05 ENCOUNTER — TELEPHONE (OUTPATIENT)
Dept: PRIMARY CARE CLINIC | Age: 56
End: 2021-10-05

## 2021-10-05 ENCOUNTER — OFFICE VISIT (OUTPATIENT)
Dept: PRIMARY CARE CLINIC | Age: 56
End: 2021-10-05
Payer: MEDICAID

## 2021-10-05 ENCOUNTER — HOSPITAL ENCOUNTER (OUTPATIENT)
Age: 56
Discharge: HOME | End: 2021-10-05
Payer: MEDICAID

## 2021-10-05 ENCOUNTER — TELEPHONE (OUTPATIENT)
Dept: PULMONOLOGY | Age: 56
End: 2021-10-05

## 2021-10-05 VITALS
TEMPERATURE: 97.8 F | SYSTOLIC BLOOD PRESSURE: 134 MMHG | HEART RATE: 84 BPM | RESPIRATION RATE: 24 BRPM | OXYGEN SATURATION: 88 % | BODY MASS INDEX: 39.26 KG/M2 | WEIGHT: 287.5 LBS | DIASTOLIC BLOOD PRESSURE: 82 MMHG

## 2021-10-05 DIAGNOSIS — I10 ESSENTIAL HYPERTENSION: ICD-10-CM

## 2021-10-05 DIAGNOSIS — Z79.4 TYPE 2 DIABETES MELLITUS WITH OTHER SPECIFIED COMPLICATION, WITH LONG-TERM CURRENT USE OF INSULIN (HCC): Primary | ICD-10-CM

## 2021-10-05 DIAGNOSIS — Z79.4 TYPE 2 DIABETES MELLITUS WITH OTHER SPECIFIED COMPLICATION, WITH LONG-TERM CURRENT USE OF INSULIN (HCC): ICD-10-CM

## 2021-10-05 DIAGNOSIS — E11.69 TYPE 2 DIABETES MELLITUS WITH OTHER SPECIFIED COMPLICATION, WITH LONG-TERM CURRENT USE OF INSULIN (HCC): Primary | ICD-10-CM

## 2021-10-05 DIAGNOSIS — E11.69 TYPE 2 DIABETES MELLITUS WITH OTHER SPECIFIED COMPLICATION, WITH LONG-TERM CURRENT USE OF INSULIN (HCC): ICD-10-CM

## 2021-10-05 LAB
ANION GAP SERPL CALCULATED.3IONS-SCNC: 11 MMOL/L (ref 9–17)
BUN BLDV-MCNC: 28 MG/DL (ref 6–20)
BUN/CREAT BLD: 32 (ref 9–20)
CALCIUM SERPL-MCNC: 9 MG/DL (ref 8.6–10.4)
CHLORIDE BLD-SCNC: 101 MMOL/L (ref 98–107)
CHOLESTEROL/HDL RATIO: 3.4
CHOLESTEROL: 94 MG/DL
CO2: 27 MMOL/L (ref 20–31)
CREAT SERPL-MCNC: 0.87 MG/DL (ref 0.7–1.2)
GFR AFRICAN AMERICAN: >60 ML/MIN
GFR NON-AFRICAN AMERICAN: >60 ML/MIN
GFR SERPL CREATININE-BSD FRML MDRD: ABNORMAL ML/MIN/{1.73_M2}
GFR SERPL CREATININE-BSD FRML MDRD: ABNORMAL ML/MIN/{1.73_M2}
GLUCOSE BLD-MCNC: 122 MG/DL (ref 70–99)
HDLC SERPL-MCNC: 28 MG/DL
LDL CHOLESTEROL: 19 MG/DL (ref 0–130)
POTASSIUM SERPL-SCNC: 4.3 MMOL/L (ref 3.7–5.3)
SODIUM BLD-SCNC: 139 MMOL/L (ref 135–144)
TRIGL SERPL-MCNC: 233 MG/DL
VLDLC SERPL CALC-MCNC: ABNORMAL MG/DL (ref 1–30)

## 2021-10-05 PROCEDURE — G8484 FLU IMMUNIZE NO ADMIN: HCPCS | Performed by: NURSE PRACTITIONER

## 2021-10-05 PROCEDURE — 80048 BASIC METABOLIC PNL TOTAL CA: CPT

## 2021-10-05 PROCEDURE — 3017F COLORECTAL CA SCREEN DOC REV: CPT | Performed by: NURSE PRACTITIONER

## 2021-10-05 PROCEDURE — 3046F HEMOGLOBIN A1C LEVEL >9.0%: CPT | Performed by: NURSE PRACTITIONER

## 2021-10-05 PROCEDURE — G8427 DOCREV CUR MEDS BY ELIG CLIN: HCPCS | Performed by: NURSE PRACTITIONER

## 2021-10-05 PROCEDURE — 99214 OFFICE O/P EST MOD 30 MIN: CPT | Performed by: NURSE PRACTITIONER

## 2021-10-05 PROCEDURE — 83036 HEMOGLOBIN GLYCOSYLATED A1C: CPT

## 2021-10-05 PROCEDURE — 80061 LIPID PANEL: CPT

## 2021-10-05 PROCEDURE — 36415 COLL VENOUS BLD VENIPUNCTURE: CPT

## 2021-10-05 PROCEDURE — 2022F DILAT RTA XM EVC RTNOPTHY: CPT | Performed by: NURSE PRACTITIONER

## 2021-10-05 PROCEDURE — G8417 CALC BMI ABV UP PARAM F/U: HCPCS | Performed by: NURSE PRACTITIONER

## 2021-10-05 PROCEDURE — 4004F PT TOBACCO SCREEN RCVD TLK: CPT | Performed by: NURSE PRACTITIONER

## 2021-10-05 ASSESSMENT — ENCOUNTER SYMPTOMS
FREQUENT THROAT CLEARING: 0
HOARSE VOICE: 0
SPUTUM PRODUCTION: 0
COUGH: 0
CONSTIPATION: 0
DIARRHEA: 0
VOMITING: 0
SORE THROAT: 0
CHEST TIGHTNESS: 0
HEMOPTYSIS: 0
NAUSEA: 0
RHINORRHEA: 0
ABDOMINAL PAIN: 0
SHORTNESS OF BREATH: 1
DIFFICULTY BREATHING: 0
WHEEZING: 0

## 2021-10-05 ASSESSMENT — COPD QUESTIONNAIRES: COPD: 1

## 2021-10-05 NOTE — TELEPHONE ENCOUNTER
Ruperto Castillo from [facility] (PET Scan) called to inform office PET scheduled 10/6 has to be cancelled due to insurance denying prior auth. Ins denied because biopsy is required before PET scan. Kanwal Goff saw Dr. Nany Riley yesterday and his plan was to see him back in office after PET scan. I spoke to Reyes Baird at [facility] and she will reach out to  how to proceed on PET scan.

## 2021-10-05 NOTE — TELEPHONE ENCOUNTER
Lung Navigator met with Radiology this AM and recent Lung Screening discussed. Addendum added to radiology report and nodule felt to be stable. Writer attempting to contact Dr. Shweta Armstrong, but not taking calls. Writer spoke with [employer]. And will try and send him a message, then get back with navigator. Jeremy Barton returning call and Dr. Shweta Armstrong wanting to delay PET for now and will review scan and film again when in the office on Thursday. 10:53 Writer spoke with pt. And informed him of above. However Pt. verbalizing PET had been cancelled already this morning by his insurance? Pt. Notifying Jaycee in PCP office and she will speak with provider. Navigator will plan to follow up with Dr. Shweta Armstrong later in the week.

## 2021-10-05 NOTE — PATIENT INSTRUCTIONS
SURVEY:    You may be receiving a survey from [employer] regarding your visit today. Please complete the survey to enable us to provide the highest quality of care to you and your family. If you cannot score us a very good on any question, please call the office to discuss how we could have made your experience a very good one. Thank you. Kait Segudno, APRN-MARINO Pastrana, MARINO Hook, LPN  Laina Chapman, LPN  Jef Golden, [place]  Jaycee, PCA    Patient Education        Chronic Obstructive Pulmonary Disease (COPD): Care Instructions  Overview     Chronic obstructive pulmonary disease (COPD) is a lung disease that makes it hard to breathe. With COPD, the airways that lead to the lungs are narrowed, and the tiny air sacs in the lungs are damaged and lose their stretch. People with COPD have decreased airflow in and out of the lungs, which makes it hard to breathe. The airways also can get clogged with thick mucus. Cigarette smoking is a major cause of COPD. Although there is no cure for COPD, you can slow its progress. Following your treatment plan and taking care of yourself can help you feel better and live longer. Follow-up care is a key part of your treatment and safety. Be sure to make and go to all appointments, and call your doctor if you are having problems. It's also a good idea to know your test results and keep a list of the medicines you take. How can you care for yourself at home? Staying healthy    · Do not smoke. This is the most important step you can take to prevent more damage to your lungs. If you need help quitting, talk to your doctor about stop-smoking programs and medicines. These can increase your chances of quitting for good.     · Avoid colds and other infections. Get the pneumococcal and whooping cough (pertussis) vaccines. If you have had these vaccines before, ask your doctor if you need another dose. Get the flu vaccine every fall.  If you must be around people with colds or the flu, wash your hands often.     · Avoid secondhand smoke and air pollution. Try to stay inside with your windows closed when air pollution is bad. Medicines and oxygen therapy    · Take your medicines exactly as prescribed. Call your doctor if you think you are having a problem with your medicine. You may be taking medicines such as:  ? Bronchodilators. These help open your airways and make breathing easier. They are either short-acting (work for 4 to 9 hours) or long-acting (work for 12 to 24 hours). You inhale most bronchodilators, so they start to act quickly. Always carry your quick-relief inhaler with you in case you need it. ? Corticosteroids (prednisone, budesonide). These reduce airway inflammation. They come in inhaled or pill form.     · Ask your doctor or pharmacist if you are using each type of inhaler correctly. With correct use, the medicine is more likely to get to your lungs.     · See if a spacer is right for you. A spacer may also help you get more inhaled medicine to your lungs. If you use one, ask how to use it properly.     · Do not take any vitamins, over-the-counter medicine, or herbal products without talking to your doctor first.     · If your doctor prescribed antibiotics, take them as directed. Do not stop taking them just because you feel better. You need to take the full course of antibiotics.     · If you use oxygen therapy, use the flow rate your doctor has recommended. Don't change it without talking to your doctor first. Oxygen therapy boosts the amount of oxygen in your blood and helps you breathe easier. Activity    · Get regular exercise. Walking is an easy way to get exercise. Start out slowly, and walk a little more each day.     · Pay attention to your breathing.  You are exercising too hard if you can't talk while you exercise.     · Take short rest breaks when doing household chores and other activities.     · Learn breathing methodssuch as breathing through pursed lipsto help you become less short of breath.     · If your doctor has not set you up with a pulmonary rehabilitation program, ask if rehab is right for you. Rehab includes exercise programs, education about your disease and how to manage it, help with diet and other changes, and emotional support. Diet    · Eat regular, healthy meals.     · Use bronchodilators about 1 hour before you eat to make it easier to eat.     · Eat several smaller, frequent meals to prevent getting too full. A full stomach can push on the muscle that helps you breathe (your diaphragm) and make it harder to breathe.     · Drink beverages at the end of the meal.     · Avoid foods that are hard to chew.     · Eat foods that contain protein so you don't lose muscle mass.     · Talk with your doctor if you gain too much weight or if you lose weight without trying. Mental health    · Talk to your family, friends, or a therapist about your feelings. Some people feel frightened, angry, hopeless, helpless, and even guilty. Talking openly about feelings may help you cope. If these feelings last, talk to your doctor. When should you call for help? Call 911 anytime you think you may need emergency care. For example, call if:    · You have severe trouble breathing.     · You are having chest pain that is different or worse than usual.   Call your doctor now or seek immediate medical care if:    · You have new or worse trouble breathing.     · You cough up blood.     · You have a fever.     · You have feelings of anxiety or depression. Watch closely for changes in your health, and be sure to contact your doctor if:    · You cough more deeply or more often, especially if you notice more mucus or a change in the color of your mucus.     · You have new or worse swelling in your legs or belly.     · You are not getting better as expected. Where can you learn more? Go to https://erna[facility]. org and sign in to your [employer] account.  Robert Cage in the [facility] box to learn more about \"Chronic Obstructive Pulmonary Disease (COPD): Care Instructions. \"     If you do not have an account, please click on the \"Sign Up Now\" link. Current as of: July 6, 2021               Content Version: 13.0  © 1794-5218 [employer]. Care instructions adapted under license by [facility] ([facility]). If you have questions about a medical condition or this instruction, always ask your healthcare professional. [address] any warranty or liability for your use of this information.

## 2021-10-05 NOTE — PROGRESS NOTES
Name: Juan Villar  : 1965         Chief Complaint:     Chief Complaint   Patient presents with    Diabetes     routine check, worried about \"what's going on\"       History of Present Illness:      Juan Villar is a 61 y.o.  male who presents with Diabetes (routine check, worried about \"what's going on\")      Joanna Mcburney is here today for routine follow-up visit. DM-fasting sugars have improved, we do not have A1c available at the time of visit as he had labs drawn this morning. See below for further comment. Diabetes  He presents for his follow-up diabetic visit. He has type 2 diabetes mellitus. His disease course has been improving. There are no hypoglycemic associated symptoms. Pertinent negatives for hypoglycemia include no dizziness or headaches. There are no diabetic associated symptoms. Pertinent negatives for diabetes include no chest pain, no fatigue, no polydipsia, no polyphagia and no polyuria. There are no hypoglycemic complications. Pertinent negatives for hypoglycemia complications include no hospitalization, no nocturnal hypoglycemia and no required assistance. Symptoms are improving. Diabetic complications include peripheral neuropathy. Pertinent negatives for diabetic complications include no CVA, heart disease or nephropathy. Risk factors for coronary artery disease include diabetes mellitus, dyslipidemia, family history, male sex, obesity, sedentary lifestyle and stress. Current diabetic treatment includes insulin injections and oral agent (monotherapy). He is compliant with treatment all of the time. His weight is decreasing steadily. He is following a generally healthy diet. When asked about meal planning, he reported none. He has not had a previous visit with a dietitian. He rarely participates in exercise. His home blood glucose trend is decreasing steadily. His breakfast blood glucose range is generally >200 mg/dl.  An ACE inhibitor/angiotensin II receptor blocker is being taken. He does not see a podiatrist.Eye exam is current. Hypertension  This is a chronic problem. The current episode started more than 1 year ago. The problem is unchanged. The problem is controlled. Associated symptoms include shortness of breath (chronic). Pertinent negatives include no chest pain, headaches, neck pain or palpitations. There are no associated agents to hypertension. Risk factors for coronary artery disease include diabetes mellitus, dyslipidemia, family history, obesity, male gender and stress. Past treatments include diuretics. The current treatment provides moderate improvement. Compliance problems include exercise and diet. There is no history of kidney disease, CAD/MI, CVA or heart failure. There is no history of chronic renal disease. Past Medical History:     Past Medical History:   Diagnosis Date    COPD (chronic obstructive pulmonary disease) ([facility] [address]     Hypertension     Liver disease       Reviewed all health maintenance requirements and ordered appropriate tests  Health Maintenance Due   Topic Date Due    A1C test (Diabetic or Prediabetic)  09/09/2021       Past Surgical History:     Past Surgical History:   Procedure Laterality Date    ANKLE SURGERY      CARPAL TUNNEL RELEASE Bilateral     HERNIA REPAIR      KNEE ARTHROSCOPY      x's 5    NECK SURGERY          Medications:       Prior to Admission medications    Medication Sig Start Date End Date Taking? Authorizing Provider   SYMBICORT 160-4.5 MCG/ACT AERO 1 puff daily 9/24/21  Yes ADRIAN Mullins CNP   zolpidem (AMBIEN) 10 MG tablet Take 1 tablet by mouth nightly as needed for Sleep for up to 90 days.  9/24/21 12/23/21 Yes ADRIAN Mullins CNP   buPROPion (WELLBUTRIN XL) 150 MG extended release tablet TAKE 1 TABLET BY MOUTH ONCE DAILY IN THE MORNING 8/30/21  Yes ADRIAN Mullins CNP   Insulin Pen Needle 31G X 6 MM MISC 1 each by Does not apply route 2 times daily 7/15/21  Yes ADRIAN Mullins CNP TRUE METRIX BLOOD GLUCOSE TEST strip USE 1 STRIP TO CHECK GLUCOSE 6/23/21  Yes Adaline Massa ADRIAN Segundo CNP   carvedilol (COREG) 3.125 MG tablet Take 1 tablet by mouth 2 times daily 6/9/21  Yes Adaline Massa ADRIAN Segundo - CNP   furosemide (LASIX) 40 MG tablet Take 1 tablet by mouth once daily 6/9/21  Yes Adaline Massa MightADRIAN - CNP   SITagliptin (JANUVIA) 100 MG tablet Take 1 tablet by mouth daily 6/9/21  Yes Adaline Massa MightADRIAN - MARINO   insulin glargine (LANTUS SOLOSTAR) 100 UNIT/ML injection pen Inject 24 Units into the skin 2 times daily 6/9/21  Yes Adaline Massa MightADRIAN - CNP   losartan (COZAAR) 25 MG tablet Take 1 tablet by mouth daily 6/9/21  Yes Adaline Massa MightADRIAN CNP   atorvastatin (LIPITOR) 40 MG tablet Take 1 tablet by mouth daily 5/11/21  Yes ADRIAN Lassiter CNP   TRUEplus Lancets 30G MISC USE 1 TO CHECK GLUCOSE 5/7/21  Yes Historical Provider, MD   Blood Glucose Monitoring Suppl (TRUE METRIX AIR GLUCOSE METER) w/Device KIT USE TO CHECK GLUCOSE DAILY 5/7/21  Yes Historical Provider, MD   metFORMIN (GLUCOPHAGE-XR) 500 MG extended release tablet Take 2 tablets by mouth daily (with breakfast) 5/7/21  Yes ADRIAN Lassiter CNP   blood glucose monitor kit and supplies Dispense sufficient amount for indicated testing frequency plus additional to accommodate PRN testing needs. Dispense all needed supplies to include: monitor, strips, lancing device, lancets, control solutions, alcohol swabs.  5/7/21  Yes ADRIAN Lassiter CNP   albuterol (PROVENTIL) (2.5 MG/3ML) 0.083% nebulizer solution USE 1 VIAL IN NEBULIZER EVERY 6 HOURS AS NEEDED FOR WHEEZING 2/22/21  Yes Adaline Sagara ADRIAN Segundo CNP   triamterene-hydroCHLOROthiazide (MAXZIDE-25) 37.5-25 MG per tablet Take 1 tablet by mouth daily 12/28/20  Yes Obedine ADRIAN Perez CNP   Respiratory Therapy Supplies (NEBULIZER/TUBING/MOUTHPIECE) KIT 1 kit by Does not apply route daily as needed (SOB/Wheezing) 12/18/20  Yes Adaline Massa Might, APRN - CNP   Nebulizers (COMPRESSOR/NEBULIZER) MISC 1 each by Does not apply route daily as needed (SOB/Wheezing) 12/18/20  Yes Christina Apple Might, APRN - CNP   oxymetazoline (AFRIN) 0.05 % nasal spray 2 sprays by Nasal route 2 times daily    Yes Historical Provider, MD   nicotine (NICODERM CQ) 14 MG/24HR Place 1 patch onto the skin every 24 hours  Patient not taking: Reported on 6/9/2021 3/30/20 3/30/21  Christina Apple Might, APRN - CNP   ammonium lactate (AMLACTIN) 12 % cream  2/17/20   Historical Provider, MD        Allergies:       Pcn [penicillins]    Social History:     Tobacco:    reports that he has been smoking cigarettes. He has a 21.00 pack-year smoking history. He has never used smokeless tobacco.  Alcohol:      reports previous alcohol use. Drug Use:  reports current drug use. Drug: Marijuana. Family History:     Family History   Problem Relation Age of Onset    Allergies Mother     Diabetes Father     Esophageal Cancer Father        Review of Systems:     Positive and Negative as described in HPI    Review of Systems   Constitutional: Negative for chills, fatigue and fever. HENT: Negative for congestion, hoarse voice, rhinorrhea and sore throat. Eyes: Negative for visual disturbance. Respiratory: Positive for shortness of breath (chronic). Negative for cough, hemoptysis, sputum production and wheezing. Cardiovascular: Positive for leg swelling (imtermttent). Negative for chest pain and palpitations. Gastrointestinal: Negative for abdominal pain, constipation, diarrhea, nausea and vomiting. Endocrine: Negative for polydipsia, polyphagia and polyuria. Genitourinary: Negative for difficulty urinating and dysuria. Musculoskeletal: Negative for gait problem, neck pain and neck stiffness. Skin: Negative for rash. Neurological: Negative for dizziness, syncope, light-headedness and headaches.        Physical Exam:   Vitals:  /82 (Position: Sitting)   Pulse 84   Temp 97.8 °F (36.6 °C) (Temporal)   Resp 24 Wt 287 lb 8 oz (130.4 kg)   SpO2 (!) 88%   BMI 39.26 kg/m²     Physical Exam  Vitals and nursing note reviewed. Constitutional:       General: He is not in acute distress. Appearance: Normal appearance. He is obese. He is not ill-appearing. HENT:      Mouth/Throat:      Mouth: Mucous membranes are moist.   Eyes:      General: No scleral icterus. Conjunctiva/sclera: Conjunctivae normal.   Neck:      Vascular: No carotid bruit. Cardiovascular:      Rate and Rhythm: Normal rate and regular rhythm. Pulmonary:      Effort: Pulmonary effort is normal.      Breath sounds: Normal breath sounds. No wheezing. Abdominal:      General: Bowel sounds are normal. There is no distension. Palpations: Abdomen is soft. Tenderness: There is no abdominal tenderness. Comments: Morbidly obese abdomen   Musculoskeletal:      Cervical back: Normal range of motion and neck supple. Right lower leg: No edema. Left lower leg: No edema. Lymphadenopathy:      Cervical: No cervical adenopathy. Skin:     General: Skin is warm and dry. Neurological:      Mental Status: He is alert and oriented to person, place, and time.    Psychiatric:         Mood and Affect: Mood normal.         Behavior: Behavior normal.         Data:     Lab Results   Component Value Date     10/05/2021    K 4.3 10/05/2021     10/05/2021    CO2 27 10/05/2021    BUN 28 10/05/2021    CREATININE 0.87 10/05/2021    GLUCOSE 122 10/05/2021    PROT 7.5 05/10/2021    LABALBU 3.8 05/10/2021    BILITOT 0.45 05/10/2021    ALKPHOS 127 05/10/2021    AST 35 05/10/2021    ALT 24 05/10/2021     Lab Results   Component Value Date    WBC 10.6 05/10/2021    RBC 5.52 05/10/2021    HGB 17.4 05/10/2021    HCT 53.3 05/10/2021    MCV 96.6 05/10/2021    MCH 31.5 05/10/2021    MCHC 32.6 05/10/2021    RDW 14.1 05/10/2021     05/10/2021    MPV 11.1 05/10/2021     Lab Results   Component Value Date    TSH 1.97 05/10/2021     Lab Results Component Value Date    CHOL 229 05/10/2021    HDL 23 05/10/2021    LABA1C 12.8 06/09/2021       Assessment/Plan:      Diagnosis Orders   1. Type 2 diabetes mellitus with other specified complication, with long-term current use of insulin ([facility] [address]     2. Essential hypertension       We will continue current medications at this point. We will adjust accordingly when we have full lab results. Fasting blood sugar was 122 this morning. We will see him back in 3 months with A1c in the office, sooner if any problems. 1.  Anusha received counseling on the following healthy behaviors: nutrition, exercise and medication adherence  2. Patient given educational materials - see patient instructions  3. Was a self-tracking handout given in paper form or via [employer]? No  If yes, see orders or list here. 4.  Discussed use, benefit, and side effects of prescribed medications. Barriers to medication compliance addressed. All patient questions answered. Pt voiced understanding. 5.  Reviewed prior labs and health maintenance  6. Continue current medications, diet and exercise. Completed Refills   Requested Prescriptions      No prescriptions requested or ordered in this encounter         Return in about 3 months (around 1/5/2022) for Check up- A1c in office.

## 2021-10-06 LAB
ESTIMATED AVERAGE GLUCOSE: 146 MG/DL
HBA1C MFR BLD: 6.7 % (ref 4–6)

## 2021-10-07 ENCOUNTER — TELEPHONE (OUTPATIENT)
Dept: PRIMARY CARE CLINIC | Age: 56
End: 2021-10-07

## 2021-10-07 ENCOUNTER — TELEPHONE (OUTPATIENT)
Dept: CASE MANAGEMENT | Age: 56
End: 2021-10-07

## 2021-10-07 DIAGNOSIS — R91.1 LUNG NODULE: Primary | ICD-10-CM

## 2021-10-22 ENCOUNTER — NURSE ONLY (OUTPATIENT)
Dept: PRIMARY CARE CLINIC | Age: 56
End: 2021-10-22
Payer: MEDICAID

## 2021-10-22 VITALS
TEMPERATURE: 97.4 F | RESPIRATION RATE: 16 BRPM | BODY MASS INDEX: 40.23 KG/M2 | HEART RATE: 77 BPM | WEIGHT: 287.4 LBS | DIASTOLIC BLOOD PRESSURE: 68 MMHG | OXYGEN SATURATION: 94 % | SYSTOLIC BLOOD PRESSURE: 136 MMHG | HEIGHT: 71 IN

## 2021-10-22 DIAGNOSIS — Z23 NEED FOR INFLUENZA VACCINATION: Primary | ICD-10-CM

## 2021-10-22 DIAGNOSIS — E11.69 TYPE 2 DIABETES MELLITUS WITH OTHER SPECIFIED COMPLICATION, UNSPECIFIED WHETHER LONG TERM INSULIN USE (HCC): ICD-10-CM

## 2021-10-22 PROCEDURE — 90471 IMMUNIZATION ADMIN: CPT | Performed by: NURSE PRACTITIONER

## 2021-10-22 PROCEDURE — 90674 CCIIV4 VAC NO PRSV 0.5 ML IM: CPT | Performed by: NURSE PRACTITIONER

## 2021-10-22 RX ORDER — GLUCOSAM/CHON-MSM1/C/MANG/BOSW 500-416.6
TABLET ORAL
Qty: 100 EACH | Refills: 5 | Status: SHIPPED | OUTPATIENT
Start: 2021-10-22

## 2021-10-22 RX ORDER — METFORMIN HYDROCHLORIDE 500 MG/1
1000 TABLET, EXTENDED RELEASE ORAL
Qty: 180 TABLET | Refills: 1 | Status: SHIPPED | OUTPATIENT
Start: 2021-10-22 | End: 2022-04-04

## 2021-10-22 NOTE — PROGRESS NOTES
After obtaining consent, and per orders of Guerrero Segundo CNP, injection of flu vaccine given in Right deltoid by Heath Lesches, MA. Patient instructed to remain in clinic for 20 minutes afterwards, and to report any adverse reaction to me immediately.

## 2021-10-22 NOTE — TELEPHONE ENCOUNTER
Health Maintenance   Topic Date Due    Hepatitis A vaccine (1 of 2 - Risk 2-dose series) 11/07/2021 (Originally 4/24/1966)    Hepatitis B vaccine (1 of 3 - Risk 3-dose series) 05/07/2022 (Originally 4/24/1983)    Shingles Vaccine (1 of 2) 05/07/2022 (Originally 4/24/2014)    HIV screen  05/07/2022 (Originally 4/24/1979)    Diabetic foot exam  06/09/2022 (Originally 4/24/1974)    Diabetic retinal exam  06/09/2022 (Originally 4/24/1974)    Pneumococcal 0-64 years Vaccine (1 of 2 - PPSV23) 06/09/2022 (Originally [birth year]/24/1970)    Diabetic microalbuminuria test  05/11/2022    Colon Cancer Screen FIT/FOBT  07/23/2022    Low dose CT lung screening  09/15/2022    A1C test (Diabetic or Prediabetic)  10/05/2022    Lipid screen  10/05/2022    Potassium monitoring  10/05/2022    Creatinine monitoring  10/05/2022    DTaP/Tdap/Td vaccine (2 - Td or Tdap) 06/09/2031    Flu vaccine  Completed    COVID-19 Vaccine  Completed    Hepatitis C screen  Completed    Hib vaccine  Aged Out    Meningococcal (ACWY) vaccine  Aged Out             (applicable per patient's age: Cancer Screenings, Depression Screening, Fall Risk Screening, Immunizations)    Hemoglobin A1C (%)   Date Value   10/05/2021 6.7 (H)   06/09/2021 12.8   05/07/2021 14     Microalb/Crt.  Ratio (mcg/mg creat)   Date Value   05/11/2021 CANNOT BE CALCULATED     LDL Cholesterol (mg/dL)   Date Value   10/05/2021 19     AST (U/L)   Date Value   05/10/2021 35     ALT (U/L)   Date Value   05/10/2021 24     BUN (mg/dL)   Date Value   10/05/2021 28 (H)      (goal A1C is < 7)   (goal LDL is <100) need 30-50% reduction from baseline     BP Readings from Last 3 Encounters:   10/22/21 136/68   10/05/21 134/82   10/04/21 139/84    (goal /80)      All Future Testing planned in [facility]:  Lab Frequency Next Occurrence   PET CT SKULL BASE TO MID THIGH Once 09/15/2021   CT CHEST LOW DOSE (LDCT) Once 03/16/2022       Next Visit Date:  Future Appointments   Date Time Provider Vinayak Cornell   12/8/2021  3:30 PM Debbi Rice DO [facility]   1/3/2022  1:15 PM Tiffani Peralta MD [facility]   1/6/2022  1:40 PM Anayeli Segundo APRN - CNP [facility]            Patient Active Problem List:     Liver disease     Chronic obstructive pulmonary disease, unspecified COPD type ([facility] [address]     Diabetes mellitus ([facility] [address]     Essential hypertension

## 2021-10-26 NOTE — TELEPHONE ENCOUNTER
I note PET scan has been cancelled due to insurance not approving. As noted in 10/5/21 nurse navigator encounter it is okay to delay PET scan for now. It appears CT chest has been ordered for 3/2022. Any further recommendations?

## 2021-10-27 NOTE — TELEPHONE ENCOUNTER
I called Fatoumata Gonzalez and informed him that the lung nodule has been stable for a year and a half based on the radiology addendum report and hence he has a CT chest scan ordered for March 2022. He does not need a PET scan or a CT scan done before then. Please have him seen in the office after CT scan of the chest.  Fatoumata Gonzalez voices understanding and appointment given for March 21/2022 at 1:45.

## 2021-10-27 NOTE — TELEPHONE ENCOUNTER
Patient's lung nodule has been stable for a year and a half based on the radiology addendum report and hence patient had a CT scan of the chest ordered for March 2022 and will not need any PET scan or a CT scan before then.   Please have the patient see me in the office after the CT scan of the chest.  Thank you

## 2021-11-01 DIAGNOSIS — E78.5 DYSLIPIDEMIA: ICD-10-CM

## 2021-11-01 RX ORDER — ATORVASTATIN CALCIUM 40 MG/1
40 TABLET, FILM COATED ORAL DAILY
Qty: 90 TABLET | Refills: 1 | OUTPATIENT
Start: 2021-11-01

## 2021-11-09 DIAGNOSIS — E78.5 DYSLIPIDEMIA: ICD-10-CM

## 2021-11-09 RX ORDER — ATORVASTATIN CALCIUM 40 MG/1
40 TABLET, FILM COATED ORAL DAILY
Qty: 90 TABLET | Refills: 3 | Status: SHIPPED | OUTPATIENT
Start: 2021-11-09 | End: 2022-10-21

## 2021-11-19 NOTE — TELEPHONE ENCOUNTER
Jaycee Urban, from primary care called to let us know that the PET Scan was not approved. She stated that the  insurance company would like a biopsy prior to the approval.    Please advise. Schedule echocardiogram and event monitor  Obtain labs (TSH, cortisol)  Remain well-hydrated and change positions slowly

## 2021-12-06 DIAGNOSIS — Z79.4 TYPE 2 DIABETES MELLITUS WITH OTHER SPECIFIED COMPLICATION, WITH LONG-TERM CURRENT USE OF INSULIN (HCC): ICD-10-CM

## 2021-12-06 DIAGNOSIS — E11.69 TYPE 2 DIABETES MELLITUS WITH OTHER SPECIFIED COMPLICATION, WITH LONG-TERM CURRENT USE OF INSULIN (HCC): ICD-10-CM

## 2021-12-06 RX ORDER — INSULIN GLARGINE 100 [IU]/ML
24 INJECTION, SOLUTION SUBCUTANEOUS 2 TIMES DAILY
Qty: 5 PEN | Refills: 5 | Status: SHIPPED | OUTPATIENT
Start: 2021-12-06 | End: 2022-05-31

## 2021-12-06 NOTE — TELEPHONE ENCOUNTER
Time Provider Vinayak Cornell   12/8/2021  3:30 PM Sandy Gilmore DO [facility]   1/6/2022  1:40 PM AD[facility]AN Garcia CNP [facility]   3/21/2022  1:45 PM Joelle Calderon MD [facility] PUL[facility]            Patient Active Problem List:     Liver disease     Chronic obstructive pulmonary disease, unspecified COPD type ([facility] [address]     Diabetes mellitus ([facility] [address]     Essential hypertension

## 2021-12-07 ENCOUNTER — TELEPHONE (OUTPATIENT)
Dept: SURGERY | Age: 56
End: 2021-12-07

## 2021-12-07 NOTE — TELEPHONE ENCOUNTER
Patient scheduled for direct colonoscopy with Dr. Thea Han on 5/13/22. All questions answered at this time, bowel prep instructions mailed to patient.

## 2022-01-27 ENCOUNTER — OFFICE VISIT (OUTPATIENT)
Dept: PRIMARY CARE CLINIC | Age: 57
End: 2022-01-27
Payer: MEDICAID

## 2022-01-27 VITALS
DIASTOLIC BLOOD PRESSURE: 68 MMHG | RESPIRATION RATE: 22 BRPM | TEMPERATURE: 97.7 F | OXYGEN SATURATION: 93 % | HEART RATE: 74 BPM | HEIGHT: 71 IN | WEIGHT: 281.7 LBS | BODY MASS INDEX: 39.44 KG/M2 | SYSTOLIC BLOOD PRESSURE: 114 MMHG

## 2022-01-27 DIAGNOSIS — I10 ESSENTIAL HYPERTENSION: ICD-10-CM

## 2022-01-27 DIAGNOSIS — F51.01 PRIMARY INSOMNIA: ICD-10-CM

## 2022-01-27 DIAGNOSIS — Z79.4 TYPE 2 DIABETES MELLITUS WITH OTHER SPECIFIED COMPLICATION, WITH LONG-TERM CURRENT USE OF INSULIN (HCC): Primary | ICD-10-CM

## 2022-01-27 DIAGNOSIS — E11.69 TYPE 2 DIABETES MELLITUS WITH OTHER SPECIFIED COMPLICATION, WITH LONG-TERM CURRENT USE OF INSULIN (HCC): Primary | ICD-10-CM

## 2022-01-27 LAB — HBA1C MFR BLD: 6.6 %

## 2022-01-27 PROCEDURE — G8417 CALC BMI ABV UP PARAM F/U: HCPCS | Performed by: NURSE PRACTITIONER

## 2022-01-27 PROCEDURE — 3044F HG A1C LEVEL LT 7.0%: CPT | Performed by: NURSE PRACTITIONER

## 2022-01-27 PROCEDURE — 99214 OFFICE O/P EST MOD 30 MIN: CPT | Performed by: NURSE PRACTITIONER

## 2022-01-27 PROCEDURE — 3017F COLORECTAL CA SCREEN DOC REV: CPT | Performed by: NURSE PRACTITIONER

## 2022-01-27 PROCEDURE — G8427 DOCREV CUR MEDS BY ELIG CLIN: HCPCS | Performed by: NURSE PRACTITIONER

## 2022-01-27 PROCEDURE — 83036 HEMOGLOBIN GLYCOSYLATED A1C: CPT | Performed by: NURSE PRACTITIONER

## 2022-01-27 PROCEDURE — 2022F DILAT RTA XM EVC RTNOPTHY: CPT | Performed by: NURSE PRACTITIONER

## 2022-01-27 PROCEDURE — G8482 FLU IMMUNIZE ORDER/ADMIN: HCPCS | Performed by: NURSE PRACTITIONER

## 2022-01-27 PROCEDURE — 4004F PT TOBACCO SCREEN RCVD TLK: CPT | Performed by: NURSE PRACTITIONER

## 2022-01-27 RX ORDER — POLYETHYLENE GLYCOL 3350 17 G/17G
POWDER, FOR SOLUTION ORAL
COMMUNITY
Start: 2021-11-10 | End: 2022-05-16 | Stop reason: SDUPTHER

## 2022-01-27 RX ORDER — OMEPRAZOLE 40 MG/1
CAPSULE, DELAYED RELEASE ORAL
COMMUNITY
Start: 2021-12-28

## 2022-01-27 RX ORDER — ZOLPIDEM TARTRATE 10 MG/1
10 TABLET ORAL NIGHTLY PRN
Qty: 30 TABLET | Refills: 2 | Status: SHIPPED | OUTPATIENT
Start: 2022-01-27 | End: 2022-05-10

## 2022-01-27 ASSESSMENT — ENCOUNTER SYMPTOMS
SORE THROAT: 0
WHEEZING: 0
NAUSEA: 0
COUGH: 0
SHORTNESS OF BREATH: 1
ABDOMINAL PAIN: 0
DIARRHEA: 0
VOMITING: 0
RHINORRHEA: 0
CONSTIPATION: 0

## 2022-01-27 ASSESSMENT — PATIENT HEALTH QUESTIONNAIRE - PHQ9
SUM OF ALL RESPONSES TO PHQ QUESTIONS 1-9: 0
1. LITTLE INTEREST OR PLEASURE IN DOING THINGS: 0
2. FEELING DOWN, DEPRESSED OR HOPELESS: 0
SUM OF ALL RESPONSES TO PHQ QUESTIONS 1-9: 0
SUM OF ALL RESPONSES TO PHQ9 QUESTIONS 1 & 2: 0

## 2022-01-27 NOTE — PROGRESS NOTES
Name: Jasmina Mccabe  : 1965         Chief Complaint:     Chief Complaint   Patient presents with    Diabetes     routine check, no concerns       History of Present Illness:      Jasmina Mccabe is a 61 y.o.  male who presents with Diabetes (routine check, no concerns)      Hannah Mcgowan is here today for routine follow-up visit. DM-stable, A1c continues to be very well controlled at 6.6. See below for further comment. Primary insomnia-stable, patient using zolpidem with good result. Denies any sleepwalking, sleep activity, or any other issues with the medication. Diabetes  He presents for his follow-up diabetic visit. He has type 2 diabetes mellitus. His disease course has been improving. There are no hypoglycemic associated symptoms. Pertinent negatives for hypoglycemia include no dizziness or headaches. There are no diabetic associated symptoms. Pertinent negatives for diabetes include no chest pain, no fatigue, no polydipsia, no polyphagia and no polyuria. There are no hypoglycemic complications. Pertinent negatives for hypoglycemia complications include no hospitalization, no nocturnal hypoglycemia and no required assistance. Symptoms are improving. Diabetic complications include peripheral neuropathy. Pertinent negatives for diabetic complications include no CVA, heart disease or nephropathy. Risk factors for coronary artery disease include diabetes mellitus, dyslipidemia, family history, male sex, obesity, sedentary lifestyle and stress. Current diabetic treatment includes insulin injections and oral agent (monotherapy). He is compliant with treatment all of the time. His weight is decreasing steadily. He is following a generally healthy diet. When asked about meal planning, he reported none. He has not had a previous visit with a dietitian. He rarely participates in exercise. His home blood glucose trend is decreasing steadily. His breakfast blood glucose range is generally 140-180 mg/dl.  An ACE inhibitor/angiotensin II receptor blocker is being taken. He does not see a podiatrist.Eye exam is current. Hypertension  This is a chronic problem. The current episode started more than 1 year ago. The problem is unchanged. The problem is controlled. Associated symptoms include shortness of breath (chronic). Pertinent negatives include no chest pain, headaches, neck pain or palpitations. There are no associated agents to hypertension. Risk factors for coronary artery disease include diabetes mellitus, dyslipidemia, family history, obesity, male gender and stress. Past treatments include diuretics. The current treatment provides moderate improvement. Compliance problems include exercise and diet. There is no history of kidney disease, CAD/MI, CVA or heart failure. There is no history of chronic renal disease. Past Medical History:     Past Medical History:   Diagnosis Date    COPD (chronic obstructive pulmonary disease) ([facility] [place] [address]     Hypertension     Liver disease       Reviewed all health maintenance requirements and ordered appropriate tests  There are no preventive care reminders to display for this patient. Past Surgical History:     Past Surgical History:   Procedure Laterality Date    ANKLE SURGERY      CARPAL TUNNEL RELEASE Bilateral     HERNIA REPAIR      KNEE ARTHROSCOPY      x's 5    NECK SURGERY          Medications:       Prior to Admission medications    Medication Sig Start Date End Date Taking? Authorizing Provider   omeprazole (PRILOSEC) 40 MG delayed release capsule TAKE 1 CAPSULE BY MOUTH ONCE DAILY FOR 30 DAYS 12/28/21  Yes Historical Provider, MD   polyethylene glycol (GLYCOLAX) 17 GM/SCOOP powder  11/10/21  Yes Historical Provider, MD   zolpidem (AMBIEN) 10 MG tablet Take 1 tablet by mouth nightly as needed for Sleep for up to 90 days.  1/27/22 4/27/22 Yes Henry Aguirre Might, APRN - MARINO   insulin glargine (LANTUS SOLOSTAR) 100 UNIT/ML injection pen Inject 24 Units into the skin 2 times daily 12/6/21  Yes ADRIAN Gomez CNP   atorvastatin (LIPITOR) 40 MG tablet Take 1 tablet by mouth daily 11/9/21  Yes ADRIAN Da Silva CNP   TRUEplus Lancets 30G MISC USE 1 TO CHECK GLUCOSE 10/22/21  Yes ADRIAN Da Silva CNP   metFORMIN (GLUCOPHAGE-XR) 500 MG extended release tablet Take 2 tablets by mouth daily (with breakfast) 10/22/21  Yes ADRIAN Da Silva CNP   SYMBICORT 160-4.5 MCG/ACT AERO 1 puff daily 9/24/21  Yes ADRIAN Da Silva - MARINO   buPROPion (WELLBUTRIN XL) 150 MG extended release tablet TAKE 1 TABLET BY MOUTH ONCE DAILY IN THE MORNING 8/30/21  Yes ADRIAN Da Silva CNP   Insulin Pen Needle 31G X 6 MM MISC 1 each by Does not apply route 2 times daily 7/15/21  Yes ADRIAN Da Silva CNP   TRUE METRIX BLOOD GLUCOSE TEST strip USE 1 STRIP TO CHECK GLUCOSE 6/23/21  Yes ADRIAN Da Silva - CNP   carvedilol (COREG) 3.125 MG tablet Take 1 tablet by mouth 2 times daily 6/9/21  Yes ADRIAN Da Silva CNP   furosemide (LASIX) 40 MG tablet Take 1 tablet by mouth once daily  Patient taking differently: 40 mg Patient PRN, legs swell 6/9/21  Yes ADRIAN Da Silva CNP   SITagliptin (JANUVIA) 100 MG tablet Take 1 tablet by mouth daily 6/9/21  Yes ADRIAN Da Silva CNP   losartan (COZAAR) 25 MG tablet Take 1 tablet by mouth daily 6/9/21  Yes ADRIAN Da Silva - CNP   Blood Glucose Monitoring Suppl (TRUE METRIX AIR GLUCOSE METER) w/Device KIT USE TO CHECK GLUCOSE DAILY 5/7/21  Yes Historical Provider, MD   blood glucose monitor kit and supplies Dispense sufficient amount for indicated testing frequency plus additional to accommodate PRN testing needs. Dispense all needed supplies to include: monitor, strips, lancing device, lancets, control solutions, alcohol swabs.  5/7/21  Yes ADRIAN Lassiter CNP   albuterol (PROVENTIL) (2.5 MG/3ML) 0.083% nebulizer solution USE 1 VIAL IN NEBULIZER EVERY 6 HOURS AS NEEDED FOR WHEEZING 2/22/21  Yes Deirdre Segundo, APRN - CNP triamterene-hydroCHLOROthiazide (MAXZIDE-25) 37.5-25 MG per tablet Take 1 tablet by mouth daily 12/28/20  Yes ADRIAN Viramontes CNP   Respiratory Therapy Supplies (NEBULIZER/TUBING/MOUTHPIECE) KIT 1 kit by Does not apply route daily as needed (SOB/Wheezing) 12/18/20  Yes ADRIAN Viramontes CNP   Nebulizers (COMPRESSOR/NEBULIZER) MISC 1 each by Does not apply route daily as needed (SOB/Wheezing) 12/18/20  Yes ADRIAN Viramontes CNP   oxymetazoline (AFRIN) 0.05 % nasal spray 2 sprays by Nasal route 2 times daily    Yes Historical Provider, MD        Allergies:       Pcn [penicillins]    Social History:     Tobacco:    reports that he has been smoking cigarettes. He has a 21.00 pack-year smoking history. He has never used smokeless tobacco.  Alcohol:      reports previous alcohol use. Drug Use:  reports current drug use. Drug: Marijuana Briseyda Moore). Family History:     Family History   Problem Relation Age of Onset    Allergies Mother     Diabetes Father     Esophageal Cancer Father        Review of Systems:     Positive and Negative as described in HPI    Review of Systems   Constitutional: Negative for chills, fatigue and fever. HENT: Negative for congestion, rhinorrhea and sore throat. Eyes: Negative for visual disturbance. Respiratory: Positive for shortness of breath (chronic). Negative for cough and wheezing. Cardiovascular: Positive for leg swelling (imtermttent). Negative for chest pain and palpitations. Gastrointestinal: Negative for abdominal pain, constipation, diarrhea, nausea and vomiting. Endocrine: Negative for polydipsia, polyphagia and polyuria. Genitourinary: Negative for difficulty urinating and dysuria. Musculoskeletal: Negative for gait problem, neck pain and neck stiffness. Skin: Negative for rash. Neurological: Negative for dizziness, syncope, light-headedness and headaches. Psychiatric/Behavioral: Positive for sleep disturbance (Controlled).        Physical Exam:   Vitals:  /68 (Position: Sitting)   Pulse 74   Temp 97.7 °F (36.5 °C) (Temporal)   Resp 22   Ht 5' 11\" (1.803 m)   Wt 281 lb 11.2 oz (127.8 kg)   SpO2 93%   BMI 39.29 kg/m²     Physical Exam  Vitals and nursing note reviewed. Constitutional:       General: He is not in acute distress. Appearance: Normal appearance. He is obese. He is not ill-appearing. HENT:      Mouth/Throat:      Mouth: Mucous membranes are moist.   Eyes:      General: No scleral icterus. Conjunctiva/sclera: Conjunctivae normal.   Neck:      Vascular: No carotid bruit. Cardiovascular:      Rate and Rhythm: Normal rate and regular rhythm. Pulmonary:      Effort: Pulmonary effort is normal.      Breath sounds: Normal breath sounds. No wheezing. Abdominal:      General: Bowel sounds are normal. There is no distension. Palpations: Abdomen is soft. Tenderness: There is no abdominal tenderness. Comments: Morbidly obese abdomen   Musculoskeletal:      Cervical back: Normal range of motion and neck supple. Right lower leg: No edema. Left lower leg: No edema. Lymphadenopathy:      Cervical: No cervical adenopathy. Skin:     General: Skin is warm and dry. Neurological:      Mental Status: He is alert and oriented to person, place, and time.    Psychiatric:         Mood and Affect: Mood normal.         Behavior: Behavior normal.         Data:     Lab Results   Component Value Date     10/05/2021    K 4.3 10/05/2021     10/05/2021    CO2 27 10/05/2021    BUN 28 10/05/2021    CREATININE 0.87 10/05/2021    GLUCOSE 122 10/05/2021    PROT 7.5 05/10/2021    LABALBU 3.8 05/10/2021    BILITOT 0.45 05/10/2021    ALKPHOS 127 05/10/2021    AST 35 05/10/2021    ALT 24 05/10/2021     Lab Results   Component Value Date    WBC 10.6 05/10/2021    RBC 5.52 05/10/2021    HGB 17.4 05/10/2021    HCT 53.3 05/10/2021    MCV 96.6 05/10/2021    MCH 31.5 05/10/2021    MCHC 32.6 05/10/2021    RDW 14.1 05/10/2021     05/10/2021    MPV 11.1 05/10/2021     Lab Results   Component Value Date    TSH 1.97 05/10/2021     Lab Results   Component Value Date    CHOL 94 10/05/2021    HDL 28 10/05/2021    LABA1C 6.6 01/27/2022       Assessment/Plan:      Diagnosis Orders   1. Type 2 diabetes mellitus with other specified complication, with long-term current use of insulin (HCC)  POCT glycosylated hemoglobin (Hb A1C)    CBC Auto Differential    ALT    AST    Basic Metabolic Panel    Hemoglobin A1C    Lipid Panel    Microalbumin, Ur   2. Essential hypertension     3. Primary insomnia  zolpidem (AMBIEN) 10 MG tablet     Continue all current medications. Congratulated on maintaining a healthy A1c. We will see him back in 6 months with full labs, sooner if any issues. 1.  Stefan Davis received counseling on the following healthy behaviors: nutrition, exercise and medication adherence  2. Patient given educational materials - see patient instructions  3. Was a self-tracking handout given in paper form or via [employer]? No  If yes, see orders or list here. 4.  Discussed use, benefit, and side effects of prescribed medications. Barriers to medication compliance addressed. All patient questions answered. Pt voiced understanding. 5.  Reviewed prior labs and health maintenance  6. Continue current medications, diet and exercise. Completed Refills   Requested Prescriptions     Signed Prescriptions Disp Refills    zolpidem (AMBIEN) 10 MG tablet 30 tablet 2     Sig: Take 1 tablet by mouth nightly as needed for Sleep for up to 90 days. Return in about 6 months (around 7/27/2022) for Check up.

## 2022-01-27 NOTE — PATIENT INSTRUCTIONS
SURVEY:     You may be receiving a survey from [employer] regarding your visit today. Please complete the survey to enable us to provide the highest quality of care to you and your family. If you cannot score us a very good on any question, please call the office to discuss how we could have made your experience a very good one. Thank you. Santino Segundo, APRN-MARINO  Lucretiajensen Walters, CNP  Kiley Molina, LPN  Beka Moseley, LPN  Jered Novak, RMA  Savilla Light, CMA  Nicky, CMA  Jaycee, PCA    Patient Education        Counting Carbohydrates for Diabetes: Care Instructions  Your Care Instructions     You don't have to eat special foods when you have diabetes. You just have to be careful to eat healthy foods. Carbohydrates (carbs) raise blood sugar higher and quicker than any other nutrient. Carbs are found in desserts, breads and cereals, and fruit. They're also in starchy vegetables. These include potatoes, corn, and grains such as rice and pasta. Carbs are also in milk and yogurt. The more carbs you eat at one time, the higher your blood sugar will rise. Spreading carbs all through the day helps keep your blood sugar levels within your target range. Counting carbs is one of the best ways to keep your blood sugar under control. If you use insulin, counting carbs helps you match the right amount of insulin to the number of grams of carbs in a meal. Then you can change your diet and insulin dose as needed. Testing your blood sugar several times a day can help you learn how carbs affect your blood sugar. A registered dietitian or certified diabetes educator can help you plan meals and snacks. Follow-up care is a key part of your treatment and safety. Be sure to make and go to all appointments, and call your doctor if you are having problems. It's also a good idea to know your test results and keep a list of the medicines you take. How can you care for yourself at home?   Know your daily amount of carbohydrates  Your daily amount depends on several things, such as your weight, how active you are, which diabetes medicines you take, and what your goals are for your blood sugar levels. A registered dietitian or certified diabetes educator can help you plan how many carbs to include in each meal and snack. For most adults, a guideline for the daily amount of carbs is:  · 45 to 60 grams at each meal. That's about the same as 3 to 4 carbohydrate servings. · 15 to 20 grams at each snack. That's about the same as 1 carbohydrate serving. Count carbs  Counting carbs lets you know how much rapid-acting insulin to take before you eat. If you use an insulin pump, you get a constant rate of insulin during the day. So the pump must be programmed at meals. This gives you extra insulin to cover the rise in blood sugar after meals. If you take insulin:  · Learn your own insulin-to-carb ratio. You and your diabetes health professional will figure out the ratio. You can do this by testing your blood sugar after meals. For example, you may need a certain amount of insulin for every 15 grams of carbs. · Add up the carb grams in a meal. Then you can figure out how many units of insulin to take based on your insulin-to-carb ratio. · Exercise lowers blood sugar. You can use less insulin than you would if you were not doing exercise. Keep in mind that timing matters. If you exercise within 1 hour after a meal, your body may need less insulin for that meal than it would if you exercised 3 hours after the meal. Test your blood sugar to find out how exercise affects your need for insulin. If you do or don't take insulin:  · Look at labels on packaged foods. This can tell you how many carbs are in a serving. You can also use guides from the American Diabetes Association. · Be aware of portions, or serving sizes. If a package has two servings and you eat the whole package, you need to double the number of grams of carbohydrate listed for one serving.   · Protein, fat, and fiber do not raise blood sugar as much as carbs do. If you eat a lot of these nutrients in a meal, your blood sugar will rise more slowly than it would otherwise. Eat from all food groups  · Eat at least three meals a day. · Plan meals to include food from all the food groups. The food groups include grains, fruits, dairy, proteins, and vegetables. · Talk to your dietitian or diabetes educator about ways to add limited amounts of sweets into your meal plan. · If you drink alcohol, talk to your doctor. It may not be recommended when you are taking certain diabetes medicines. Where can you learn more? Go to https://Sotmarketpepiceweb.[email or web address]. org and sign in to your [email or web address] account. Enter S751 in the 5gig box to learn more about \"Counting Carbohydrates for Diabetes: Care Instructions. \"     If you do not have an account, please click on the \"Sign Up Now\" link. Current as of: July 28, 2021               Content Version: 13.1  © 2006-2021 [employer]. Care instructions adapted under license by [facility] ([facility]). If you have questions about a medical condition or this instruction, always ask your healthcare professional. [place] [address] any warranty or liability for your use of this information. Patient Education        High Blood Pressure: Care Instructions  Overview     It's normal for blood pressure to go up and down throughout the day. But if it stays up, you have high blood pressure. Another name for high blood pressure is hypertension. Despite what a lot of people think, high blood pressure usually doesn't cause headaches or make you feel dizzy or lightheaded. It usually has no symptoms. But it does increase your risk of stroke, heart attack, and other problems. You and your doctor will talk about your risks of these problems based on your blood pressure. Your doctor will give you a goal for your blood pressure.  Your goal will be based on your health and your age. Lifestyle changes, such as eating healthy and being active, are always important to help lower blood pressure. You might also take medicine to reach your blood pressure goal.  Follow-up care is a key part of your treatment and safety. Be sure to make and go to all appointments, and call your doctor if you are having problems. It's also a good idea to know your test results and keep a list of the medicines you take. How can you care for yourself at home? Medical treatment  · If you stop taking your medicine, your blood pressure will go back up. You may take one or more types of medicine to lower your blood pressure. Be safe with medicines. Take your medicine exactly as prescribed. Call your doctor if you think you are having a problem with your medicine. · Talk to your doctor before you start taking aspirin every day. Aspirin can help certain people lower their risk of a heart attack or stroke. But taking aspirin isn't right for everyone, because it can cause serious bleeding. · See your doctor regularly. You may need to see the doctor more often at first or until your blood pressure comes down. · If you are taking blood pressure medicine, talk to your doctor before you take decongestants or anti-inflammatory medicine, such as ibuprofen. Some of these medicines can raise blood pressure. · Learn how to check your blood pressure at home. Lifestyle changes  · Stay at a healthy weight. This is especially important if you put on weight around the waist. Losing even 10 pounds can help you lower your blood pressure. · If your doctor recommends it, get more exercise. Walking is a good choice. Bit by bit, increase the amount you walk every day. Try for at least 30 minutes on most days of the week. You also may want to swim, bike, or do other activities. · Avoid or limit alcohol. Talk to your doctor about whether you can drink any alcohol.   · Try to limit how much sodium you eat to less than 2,300 milligrams (mg) a day. Your doctor may ask you to try to eat less than 1,500 mg a day. · Eat plenty of fruits (such as bananas and oranges), vegetables, legumes, whole grains, and low-fat dairy products. · Lower the amount of saturated fat in your diet. Saturated fat is found in animal products such as milk, cheese, and meat. Limiting these foods may help you lose weight and also lower your risk for heart disease. · Do not smoke. Smoking increases your risk for heart attack and stroke. If you need help quitting, talk to your doctor about stop-smoking programs and medicines. These can increase your chances of quitting for good. When should you call for help? Call 911  anytime you think you may need emergency care. This may mean having symptoms that suggest that your blood pressure is causing a serious heart or blood vessel problem. Your blood pressure may be over 180/120. For example, call 911 if:    · You have symptoms of a heart attack. These may include:  ? Chest pain or pressure, or a strange feeling in the chest.  ? Sweating. ? Shortness of breath. ? Nausea or vomiting. ? Pain, pressure, or a strange feeling in the back, neck, jaw, or upper belly or in one or both shoulders or arms. ? Lightheadedness or sudden weakness. ? A fast or irregular heartbeat.     · You have symptoms of a stroke. These may include:  ? Sudden numbness, tingling, weakness, or loss of movement in your face, arm, or leg, especially on only one side of your body. ? Sudden vision changes. ? Sudden trouble speaking. ? Sudden confusion or trouble understanding simple statements. ? Sudden problems with walking or balance. ? A sudden, severe headache that is different from past headaches.     · You have severe back or belly pain. Do not wait until your blood pressure comes down on its own. Get help right away.   Call your doctor now or seek immediate care if:    · Your blood pressure is much higher than normal (such as 180/120 or higher), but you don't have symptoms.     · You think high blood pressure is causing symptoms, such as:  ? Severe headache.  ? Blurry vision. Watch closely for changes in your health, and be sure to contact your doctor if:    · Your blood pressure measures higher than your doctor recommends at least 2 times. That means the top number is higher or the bottom number is higher, or both.     · You think you may be having side effects from your blood pressure medicine. Where can you learn more? Go to https://Sensory Networks.Crelow. org and sign in to your PushPage account. Enter X928 in the Re5ult box to learn more about \"High Blood Pressure: Care Instructions. \"     If you do not have an account, please click on the \"Sign Up Now\" link. Current as of: April 29, 2021               Content Version: 13.1  © 8599-2232 [employer]wise[employer]. Care instructions adapted under license by [facility] ([facility]). If you have questions about a medical condition or this instruction, always ask your healthcare professional. [address] any warranty or liability for your use of this information.

## 2022-02-23 DIAGNOSIS — J44.9 CHRONIC OBSTRUCTIVE PULMONARY DISEASE, UNSPECIFIED COPD TYPE (HCC): ICD-10-CM

## 2022-02-23 RX ORDER — ALBUTEROL SULFATE 2.5 MG/3ML
SOLUTION RESPIRATORY (INHALATION)
Qty: 300 ML | Refills: 1 | Status: SHIPPED | OUTPATIENT
Start: 2022-02-23 | End: 2022-04-05 | Stop reason: SDUPTHER

## 2022-02-23 NOTE — TELEPHONE ENCOUNTER
27/57/0293   Basic Metabolic Panel Once 23/34/6974   Hemoglobin A1C Once 07/26/2022   Lipid Panel Once 07/26/2022   Microalbumin, Ur Once 07/26/2022       Next Visit Date:  Future Appointments   Date Time Provider Vinayak Cornell   3/21/2022  1:45 PM Bety Thomas MD [facility]   7/28/2022 12:00 PM ADRIAN Miranda - CNP [facility]            Patient Active Problem List:     Liver disease     Chronic obstructive pulmonary disease, unspecified COPD type ([facility] [address]     Diabetes mellitus ([facility] [address]     Essential hypertension

## 2022-03-02 DIAGNOSIS — J44.9 CHRONIC OBSTRUCTIVE PULMONARY DISEASE, UNSPECIFIED COPD TYPE (HCC): ICD-10-CM

## 2022-03-02 RX ORDER — BUDESONIDE AND FORMOTEROL FUMARATE DIHYDRATE 160; 4.5 UG/1; UG/1
AEROSOL RESPIRATORY (INHALATION)
Qty: 11 G | Refills: 3 | Status: SHIPPED | OUTPATIENT
Start: 2022-03-02 | End: 2022-05-12 | Stop reason: SDUPTHER

## 2022-03-02 NOTE — TELEPHONE ENCOUNTER
Health Maintenance   Topic Date Due    Hepatitis B vaccine (1 of 3 - Risk 3-dose series) 05/07/2022 (Originally 4/24/1983)    Shingles Vaccine (1 of 2) 05/07/2022 (Originally 4/24/2014)    HIV screen  05/07/2022 (Originally 4/24/1979)    Diabetic foot exam  06/09/2022 (Originally 4/24/1974)    Diabetic retinal exam  06/09/2022 (Originally 4/24/1982)    Pneumococcal 0-64 years Vaccine (1 of 2 - PPSV23) 06/09/2022 (Originally 4/24/1970)    Hepatitis A vaccine (1 of 2 - Risk 2-dose series) 01/27/2023 (Originally 4/24/1965)    Diabetic microalbuminuria test  05/11/2022    Colorectal Cancer Screen  07/23/2022    Low dose CT lung screening  09/15/2022    Lipid screen  10/05/2022    Potassium monitoring  10/05/2022    Creatinine monitoring  10/05/2022    A1C test (Diabetic or Prediabetic)  01/27/2023    Depression Screen  01/27/2023    DTaP/Tdap/Td vaccine (2 - Td or Tdap) 06/09/2031    Flu vaccine  Completed    COVID-19 Vaccine  Completed    Hepatitis C screen  Completed    Hib vaccine  Aged Out    Meningococcal (ACWY) vaccine  Aged Out             (applicable per patient's age: Cancer Screenings, Depression Screening, Fall Risk Screening, Immunizations)    Hemoglobin A1C (%)   Date Value   01/27/2022 6.6   10/05/2021 6.7 (H)   06/09/2021 12.8     Microalb/Crt.  Ratio (mcg/mg creat)   Date Value   05/11/2021 CANNOT BE CALCULATED     LDL Cholesterol (mg/dL)   Date Value   10/05/2021 19     AST (U/L)   Date Value   05/10/2021 35     ALT (U/L)   Date Value   05/10/2021 24     BUN (mg/dL)   Date Value   10/05/2021 28 (H)      (goal A1C is < 7)   (goal LDL is <100) need 30-50% reduction from baseline     BP Readings from Last 3 Encounters:   01/27/22 114/68   10/22/21 136/68   10/05/21 134/82    (goal /80)      All Future Testing planned in [facility]:  Lab Frequency Next Occurrence   CT CHEST LOW DOSE (LDCT) Once 03/16/2022   CBC Auto Differential Once 07/26/2022   ALT Once 07/27/2022   AST Once 54/56/4767   Basic Metabolic Panel Once 55/50/6138   Hemoglobin A1C Once 07/26/2022   Lipid Panel Once 07/26/2022   Microalbumin, Ur Once 07/26/2022       Next Visit Date:  Future Appointments   Date Time Provider Vinayak Cornell   6/6/2022 11:45 AM [facility]   7/28/2022 12:00 PM Teresa Segundo APRN - CNP [facility]            Patient Active Problem List:     Liver disease     Chronic obstructive pulmonary disease, unspecified COPD type ([facility] [address]     Diabetes mellitus ([facility] [address]     Essential hypertension

## 2022-04-02 DIAGNOSIS — E11.69 TYPE 2 DIABETES MELLITUS WITH OTHER SPECIFIED COMPLICATION, UNSPECIFIED WHETHER LONG TERM INSULIN USE (HCC): ICD-10-CM

## 2022-04-04 RX ORDER — METFORMIN HYDROCHLORIDE 500 MG/1
TABLET, EXTENDED RELEASE ORAL
Qty: 180 TABLET | Refills: 1 | Status: SHIPPED | OUTPATIENT
Start: 2022-04-04 | End: 2022-10-21

## 2022-04-04 NOTE — TELEPHONE ENCOUNTER
Health Maintenance   Topic Date Due    Hepatitis B vaccine (1 of 3 - Risk 3-dose series) 05/07/2022 (Originally 4/24/1983)    Shingles Vaccine (1 of 2) 05/07/2022 (Originally 4/24/2014)    HIV screen  05/07/2022 (Originally 4/24/1979)    Diabetic foot exam  06/09/2022 (Originally 4/24/1974)    Diabetic retinal exam  06/09/2022 (Originally 4/24/1982)    Pneumococcal 0-64 years Vaccine (1 of 2 - PPSV23) 06/09/2022 (Originally 4/24/1970)    Hepatitis A vaccine (1 of 2 - Risk 2-dose series) 01/27/2023 (Originally 4/24/1965)    Diabetic microalbuminuria test  05/11/2022    Colorectal Cancer Screen  07/23/2022    Low dose CT lung screening  09/15/2022    Lipid screen  10/05/2022    Potassium monitoring  10/05/2022    Creatinine monitoring  10/05/2022    A1C test (Diabetic or Prediabetic)  01/27/2023    Depression Screen  01/27/2023    DTaP/Tdap/Td vaccine (2 - Td or Tdap) 06/09/2031    Flu vaccine  Completed    COVID-19 Vaccine  Completed    Hepatitis C screen  Completed    Hib vaccine  Aged Out    Meningococcal (ACWY) vaccine  Aged Out             (applicable per patient's age: Cancer Screenings, Depression Screening, Fall Risk Screening, Immunizations)    Hemoglobin A1C (%)   Date Value   01/27/2022 6.6   10/05/2021 6.7 (H)   06/09/2021 12.8     Microalb/Crt.  Ratio (mcg/mg creat)   Date Value   05/11/2021 CANNOT BE CALCULATED     LDL Cholesterol (mg/dL)   Date Value   10/05/2021 19     AST (U/L)   Date Value   05/10/2021 35     ALT (U/L)   Date Value   05/10/2021 24     BUN (mg/dL)   Date Value   10/05/2021 28 (H)      (goal A1C is < 7)   (goal LDL is <100) need 30-50% reduction from baseline     BP Readings from Last 3 Encounters:   01/27/22 114/68   10/22/21 136/68   10/05/21 134/82    (goal /80)      All Future Testing planned in [facility]:  Lab Frequency Next Occurrence   CT CHEST LOW DOSE (LDCT) Once 04/30/2022   CBC Auto Differential Once 07/26/2022   ALT Once 07/27/2022   AST Once 56/31/9002   Basic Metabolic Panel Once 03/20/8579   Hemoglobin A1C Once 07/26/2022   Lipid Panel Once 07/26/2022   Microalbumin, Ur Once 07/26/2022       Next Visit Date:  Future Appointments   Date Time Provider Vinayak Cornell   6/6/2022 11:45 AM Arpit Nicole MD [facility]   7/28/2022 12:00 PM Helen Hammans Might, APRN - CNP [facility]            Patient Active Problem List:     Liver disease     Chronic obstructive pulmonary disease, unspecified COPD type ([facility] [address]     Diabetes mellitus ([facility] [address]     Essential hypertension

## 2022-04-05 DIAGNOSIS — J44.9 CHRONIC OBSTRUCTIVE PULMONARY DISEASE, UNSPECIFIED COPD TYPE (HCC): ICD-10-CM

## 2022-04-05 RX ORDER — ALBUTEROL SULFATE 2.5 MG/3ML
SOLUTION RESPIRATORY (INHALATION)
Qty: 300 ML | Refills: 1 | Status: SHIPPED | OUTPATIENT
Start: 2022-04-05 | End: 2022-07-18 | Stop reason: SDUPTHER

## 2022-04-05 NOTE — TELEPHONE ENCOUNTER
LVM to patient in regards to refill being sent and that he should contact his pulmonologist to adjust the dosage.

## 2022-04-05 NOTE — TELEPHONE ENCOUNTER
Patient contacted the office requesting a refill of his nebulizer solution. Patient was asking for an increase in dosage if possible. If not possible he will keep the same dosage. Please advise. Health Maintenance   Topic Date Due    Hepatitis B vaccine (1 of 3 - Risk 3-dose series) 05/07/2022 (Originally 4/24/1983)    Shingles Vaccine (1 of 2) 05/07/2022 (Originally 4/24/2014)    HIV screen  05/07/2022 (Originally 4/24/1979)    Diabetic foot exam  06/09/2022 (Originally 4/24/1974)    Diabetic retinal exam  06/09/2022 (Originally 4/24/1982)    Pneumococcal 0-64 years Vaccine (1 of 2 - PPSV23) 06/09/2022 (Originally 4/24/1970)    Hepatitis A vaccine (1 of 2 - Risk 2-dose series) 01/27/2023 (Originally 4/24/1965)    Diabetic microalbuminuria test  05/11/2022    Colorectal Cancer Screen  07/23/2022    Low dose CT lung screening  09/15/2022    Lipid screen  10/05/2022    Potassium monitoring  10/05/2022    Creatinine monitoring  10/05/2022    A1C test (Diabetic or Prediabetic)  01/27/2023    Depression Screen  01/27/2023    DTaP/Tdap/Td vaccine (2 - Td or Tdap) 06/09/2031    Flu vaccine  Completed    COVID-19 Vaccine  Completed    Hepatitis C screen  Completed    Hib vaccine  Aged Out    Meningococcal (ACWY) vaccine  Aged Out             (applicable per patient's age: Cancer Screenings, Depression Screening, Fall Risk Screening, Immunizations)    Hemoglobin A1C (%)   Date Value   01/27/2022 6.6   10/05/2021 6.7 (H)   06/09/2021 12.8     Microalb/Crt.  Ratio (mcg/mg creat)   Date Value   05/11/2021 CANNOT BE CALCULATED     LDL Cholesterol (mg/dL)   Date Value   10/05/2021 19     AST (U/L)   Date Value   05/10/2021 35     ALT (U/L)   Date Value   05/10/2021 24     BUN (mg/dL)   Date Value   10/05/2021 28 (H)      (goal A1C is < 7)   (goal LDL is <100) need 30-50% reduction from baseline     BP Readings from Last 3 Encounters:   01/27/22 114/68   10/22/21 136/68   10/05/21 134/82    (goal BP 120/80)      All Future Testing planned in [facility]:  Lab Frequency Next Occurrence   CT CHEST LOW DOSE (LDCT) Once 04/30/2022   CBC Auto Differential Once 07/26/2022   ALT Once 07/27/2022   AST Once 38/80/5406   Basic Metabolic Panel Once 85/81/3394   Hemoglobin A1C Once 07/26/2022   Lipid Panel Once 07/26/2022   Microalbumin, Ur Once 07/26/2022       Next Visit Date:  Future Appointments   Date Time Provider Vinayak Blancoi   6/6/2022 11:45 AM [facility]   7/28/2022 12:00 PM Cheryle Huh Might, APRN - CNP [facility]            Patient Active Problem List:     Liver disease     Chronic obstructive pulmonary disease, unspecified COPD type ([address]     Diabetes mellitus ([address]     Essential hypertension

## 2022-04-06 ENCOUNTER — TELEPHONE (OUTPATIENT)
Dept: ONCOLOGY | Age: 57
End: 2022-04-06

## 2022-04-28 ENCOUNTER — TELEPHONE (OUTPATIENT)
Dept: PRIMARY CARE CLINIC | Age: 57
End: 2022-04-28

## 2022-04-28 NOTE — TELEPHONE ENCOUNTER
Message left to call office regarding appointment for May 5th, patient does not need seen until July, for 6 month routine check up

## 2022-05-02 ENCOUNTER — HOSPITAL ENCOUNTER (OUTPATIENT)
Dept: CT IMAGING | Age: 57
Discharge: HOME | End: 2022-05-04
Payer: MEDICAID

## 2022-05-02 DIAGNOSIS — R91.1 LUNG NODULE: ICD-10-CM

## 2022-05-02 PROCEDURE — 71250 CT THORAX DX C-: CPT

## 2022-05-03 NOTE — PROGRESS NOTES
Patient states they received their colon prep instructions and home medications that are to be taken on the day of their procedure with a small sip of water only, from the physician's office. Reviewed medications and instructed pt to use symbicort inhaler and to take wellbutrin and coreg with a small sip of water prior to arriving to the hospital the day of surgery.

## 2022-05-05 DIAGNOSIS — Z01.818 PRE-OP TESTING: Primary | ICD-10-CM

## 2022-05-05 NOTE — PROGRESS NOTES
Subjective:      Patient ID: Oliver Lloyd is a 61 y.o. male. HPI  Patient is here for follow-up for COPD, smoking, hypoxemia on supplemental O2. Patient was last seen in the office in October 2021 per Dr. Jesus Horvath. Smoking 1/2 pack per day- trying to go cold turkey    Patient endorses with weight gain and he is noted worsening of his shortness of breath. Denies any significant cough or sputum production. Reviewed CT imaging with patient. Lung nodule appears stable for 2 years. Previous imaging with PET scan was negative. Ongoing smoking indicates patient will need to continue with  CT imaging. Strongly admonished smoking cessation. Patient has been using his father-in-law's DuoNeb at home and feels like he is having better control with his breathing. DuoNeb was ordered for the patient. Medications:   Albuterol  Symbicort      PRIOR WORKUP:  PFT:  PFT 6/22/2020: Patient spirometry shows an obstructive flow volume loop with an FEV1 of 41% of predicted with an 11% postbronchodilator change to 46% of predicted. FVC is 2.74, 54% of predicted with a 10% bronchodilator change to 3.10 L. FEV1/FVC ratio is 58. % of predicted. Total lung capacity 115% of predicted. Diffusion capacity when corrected for alveolar volume is normal.  Final impression: Stage III COPD with a bronchodilator response. Air trapping with mild to moderately decreased diffusion capacity. CT Imaging:  CT lung screening 5/2/2022: No mediastinal hilar or axillary adenopathy. Anterior segment left upper lobe micronodule is unchanged. Solid noncalcified 9.3 mm right lower lobe nodule measured 8.5 mm in February 2020. Not FDG avid on PET scan in March 2020. No significant new nodules or masses. CT lung screening 9/15/2021: No mediastinal adenopathy. Slightly enlarging 9 mm right lower lobe pulmonary nodule previously measuring 7 mm in June 2020. Stable 3 mm subpleural right middle lobe nodule.   Mild emphysema. Multifocal parenchymal banding, bibasilar atelectasis and respiratory motion. No pneumothorax or airspace consolidation. Stable 2 mm pulmonary nodule in the subpleural left upper lobe unchanged from June 2020. CT chest 6/22/2020: No mediastinal adenopathy. Mild scarring with emphysematous changes. No focal consolidation, pneumothorax or pleural effusion. Stable 9 mm solid noncalcified pulmonary nodule right lower lobe. Stable 3 mm solid noncalcified subpleural pulmonary nodule right middle lobe. No new or enlarging pulmonary nodule. Calcified granuloma right lower lobe. PET scan 3/18/2020: 3 mm nodule within the right middle lobe is too small for PET scan characterization. 9 mm nodule within the right lower lobe is not FDG avid with an SUV max of 0.8. No abnormal FDG avidity within the chest.    CT lung screening 2/26/2020: No mediastinal adenopathy. Lungs are without acute process. No focal consolidation or pulmonary edema. No pleural effusion or pneumothorax. Punctate calcified granuloma peripheral right lower lobe. 9 mm right lower lobe nodule and a 3 mm right middle lobe pleural-based nodule. Sleep Study:    Laboratory Evaluation:    Immunizations:   Immunization History   Administered Date(s) Administered    COVID-19, J&J, PF, 0.5 mL 04/29/2021, 12/15/2021    Influenza, MDCK Quadv, IM, PF (Flucelvax 2 yrs and older) 10/22/2021    Influenza, Quadv, IM, (6 mo and older Fluzone, Flulaval, Fluarix and 3 yrs and older Afluria) 12/07/2020    Tdap (Boostrix, Adacel) 06/09/2021        No flowsheet data found.     /73   Pulse 73   Temp 97.8 °F (36.6 °C)   Resp 16   Ht 5' 11.5\" (1.816 m)   Wt (!) 303 lb 12.8 oz (137.8 kg)   SpO2 98%   BMI 41.78 kg/m²     Past Medical History:   Diagnosis Date    COPD (chronic obstructive pulmonary disease) ([facility] [address]     Hypertension     Liver disease      Past Surgical History:   Procedure Laterality Date    ANKLE SURGERY      CARPAL TUNNEL RELEASE Bilateral     HERNIA REPAIR      KNEE ARTHROSCOPY      x's 5    NECK SURGERY       Family History   Problem Relation Age of Onset    Allergies Mother     Diabetes Father     Esophageal Cancer Father        Social History     Socioeconomic History    Marital status: Single     Spouse name: Not on file    Number of children: Not on file    Years of education: 15    Highest education level: Not on file   Occupational History    Not on file   Tobacco Use    Smoking status: Current Some Day Smoker     Packs/day: 0.50     Years: 42.00     Pack years: 21.00     Types: Cigarettes    Smokeless tobacco: Never Used   Vaping Use    Vaping Use: Never used   Substance and Sexual Activity    Alcohol use: Not Currently     Comment: rarely-hx of heavy alcohol use    Drug use: Yes     Types: Marijuana Bhavin Bilberry)    Sexual activity: Not on file   Other Topics Concern    Not on file   Social History Narrative    Not on file     Social Determinants of Health     Financial Resource Strain: Medium Risk    Difficulty of Paying Living Expenses: Somewhat hard   Food Insecurity: Food Insecurity Present    Worried About Running Out of Food in the Last Year: Sometimes true    Brian of Food in the Last Year: Sometimes true   Transportation Needs:     Lack of Transportation (Medical): Not on file    Lack of Transportation (Non-Medical):  Not on file   Physical Activity:     Days of Exercise per Week: Not on file    Minutes of Exercise per Session: Not on file   Stress:     Feeling of Stress : Not on file   Social Connections:     Frequency of Communication with Friends and Family: Not on file    Frequency of Social Gatherings with Friends and Family: Not on file    Attends Mandaeism Services: Not on file    Active Member of Clubs or Organizations: Not on file    Attends Club or Organization Meetings: Not on file    Marital Status: Not on file   Intimate Partner Violence:     Fear of Current or Ex-Partner: Not on file    Emotionally Abused: Not on file    Physically Abused: Not on file    Sexually Abused: Not on file   Housing Stability:     Unable to Pay for Housing in the Last Year: Not on file    Number of Places Lived in the Last Year: Not on file    Unstable Housing in the Last Year: Not on file       Review of Systems   Constitutional:        Decreased activity tolerance secondary to exertional dyspnea. HENT: Negative. Eyes: Negative. Respiratory:        Shortness of breath, occasional cough productive of sputum. Denies purulent secretions or hemoptysis. Cardiovascular: Negative. Gastrointestinal: Negative. Endocrine: Negative. Genitourinary: Negative. Musculoskeletal: Negative. Skin: Negative. Allergic/Immunologic: Negative. Neurological: Negative. Hematological: Negative. Psychiatric/Behavioral: Negative. Objective:       Physical Exam  General appearance - alert, well appearing, and in no distress, oriented to person, place, and time and overweight  Mental status - alert, oriented to person, place, and time, normal mood, behavior, speech, dress, motor activity, and thought processes  Eyes - pupils equal and reactive, extraocular eye movements intact  Ears - not examined  Nose - normal and patent, no erythema, discharge or polyps  Mouth - mucous membranes moist, pharynx normal without lesions  Neck - supple, no significant adenopathy  Chest -increased AP diameter, decreased thoracic expansion and excursion, prolonged Tory phase. No adventitious lung sounds appreciated. No wheezing rhonchi or rales.   Heart -normal rate, regular rhythm, normal S1, S2, no murmurs, rubs, clicks or gallops  Abdomen - soft, nontender, nondistended, no masses or organomegaly  Neuro- alert, oriented, normal speech, no focal findings or movement disorder noted}  Extremities - peripheral pulses normal, no pedal edema, no clubbing or cyanosis  Skin - normal coloration and turgor, no rashes, no suspicious skin lesions noted     Wt Readings from Last 3 Encounters:   05/12/22 (!) 303 lb 12.8 oz (137.8 kg)   01/27/22 281 lb 11.2 oz (127.8 kg)   10/22/21 287 lb 6.4 oz (130.4 kg)       Results for orders placed or performed in visit on 01/27/22   POCT glycosylated hemoglobin (Hb A1C)   Result Value Ref Range    Hemoglobin A1C 6.6 %       CT CHEST LOW DOSE (LDCT)    Result Date: 5/4/2022  EXAMINATION: LOW DOSE OF THE CT  CHEST WITHOUT CONTRAST 5/2/2022 2:00 pm TECHNIQUE: Low Dose of the CT Chest without the administration of intravenous contrast (MA=40). Multiplanar reformatted images are provided for review. Dose modulation, iterative reconstruction, and/or weight based adjustment of the mA/kV was utilized to reduce the radiation dose to as low as reasonably achievable. COMPARISON: 09/15/2021, 02/26/2020 HISTORY: ORDERING SYSTEM PROVIDED HISTORY: Lung nodule TECHNOLOGIST PROVIDED HISTORY: f/u of lung nodule FINDINGS: Mediastinum: The cardiac size is normal. Stable 12 mm short axis subcarinal lymph node. .  There is no significant mediastinal, hilar or axillary lymphadenopathy. The thyroid gland shows no significant abnormalities. The esophagus shows no significant abnormalities. Lungs/pleura: Anterior segment left upper lobe micronodule mention on prior is unchanged, series 3, image 37. Solid noncalcified 9.3 mm right lower lobe nodule on series 3, image 75 measured 8.5 mm in February 2020. Not FDG avid on the PET-CT of March 2020. No significant new nodules or masses. Upper Abdomen: No acute process. No suspicious masses. Soft Tissues/Bones: No acute abnormality of the bones. The superficial soft tissues show no significant abnormalities. 1. Solid noncalcified right lower lobe 9.3 mm nodule measuring 8.5 mm in 2020 and not FDG avid on the PET-CT of March 2020. Suggest 12 month follow-up. RECOMMENDATIONS: Unavailable       Assessment:      1. Simple chronic bronchitis ([address]    2. Chronic obstructive pulmonary disease, unspecified COPD type (HCC)    3. Lung nodule    4. Obesity due to excess calories, unspecified obesity severity    5. Multiple nodules of lung    6. Morbid obesity due to excess calories ([address]    7. Personal history of tobacco use          Plan:      1. Medications reviewed, continue as ordered. DuoNeb ordered. 2. Educated and clarified the medication use. 3. Recommend flu vaccination in the fall annually. 4. Patient is up-to-date with pneumococcal vaccinations from pulmonary perspective. 5. Patient has received Covid vaccination. Recommended booster when eligible. Discussed strategies to protect against Covid 19.   6. Maintain an active lifestyle. 7. Patient's questions were answered to his satisfaction. 8. Plan monitor smoking cessation. 9. Pulmonary function tests were reviewed   10. CT scan of the chest was reviewed  11. We'll see the patient back in 6 months          Electronically signed by ADRIAN Bae CNP on 5/12/2022 at 2:43 PM  Low Dose CT (LDCT) Lung Screening criteria met:     Age 50-77(Medicare) or 50-80 (USPST)   Pack year smoking >20   Still smoking or less than 15 year since quit   No sign or symptoms of lung cancer   > 11 months since last LDCT     Risks and benefits of lung cancer screening with LDCT scans discussed:    Significance of positive screen - False-positive LDCT results often occur. 95% of all positive results do not lead to a diagnosis of cancer. Usually further imaging can resolve most false-positive results; however, some patients may require invasive procedures. Over diagnosis risk - 10% to 12% of screen-detected lung cancer cases are over diagnosed--that is, the cancer would not have been detected in the patient's lifetime without the screening.     Need for follow up screens annually to continue lung cancer screening effectiveness     Risks associated with radiation from annual LDCT- Radiation exposure is about the same as for a mammogram, which is about 1/3 of the annual background radiation exposure from everyday life. Starting screening at age 54 is not likely to increase cancer risk from radiation exposure. Patients with comorbidities resulting in life expectancy of < 10 years, or that would preclude treatment of an abnormality identified on CT, should not be screened due to lack of benefit.     To obtain maximal benefit from this screening, smoking cessation and long-term abstinence from smoking is critical

## 2022-05-10 DIAGNOSIS — F51.01 PRIMARY INSOMNIA: ICD-10-CM

## 2022-05-10 RX ORDER — NAPROXEN 500 MG/1
TABLET ORAL
COMMUNITY
Start: 2022-05-10

## 2022-05-10 RX ORDER — ZOLPIDEM TARTRATE 10 MG/1
10 TABLET ORAL NIGHTLY PRN
Qty: 90 TABLET | Refills: 0 | Status: SHIPPED | OUTPATIENT
Start: 2022-05-10 | End: 2022-08-08

## 2022-05-10 NOTE — TELEPHONE ENCOUNTER
Health Maintenance   Topic Date Due    HIV screen  Never done    Hepatitis B vaccine (1 of 3 - Risk 3-dose series) Never done    Shingles vaccine (1 of 2) Never done    Diabetic microalbuminuria test  05/11/2022    Diabetic foot exam  06/09/2022 (Originally 4/24/1974)    Diabetic retinal exam  06/09/2022 (Originally 4/24/1982)    Pneumococcal 0-64 years Vaccine (1 - PCV) 06/09/2022 (Originally 4/24/1970)    Hepatitis A vaccine (1 of 2 - Risk 2-dose series) 01/27/2023 (Originally [birth year]/24/1965)    Colorectal Cancer Screen  07/23/2022    Low dose CT lung screening  09/15/2022    Lipids  10/05/2022    Potassium  10/05/2022    Creatinine  10/05/2022    A1C test (Diabetic or Prediabetic)  01/27/2023    Depression Screen  01/27/2023    DTaP/Tdap/Td vaccine (2 - Td or Tdap) 06/09/2031    Flu vaccine  Completed    COVID-19 Vaccine  Completed    Hepatitis C screen  Completed    Hib vaccine  Aged Out    Meningococcal (ACWY) vaccine  Aged Out             (applicable per patient's age: Cancer Screenings, Depression Screening, Fall Risk Screening, Immunizations)    Hemoglobin A1C (%)   Date Value   01/27/2022 6.6   10/05/2021 6.7 (H)   06/09/2021 12.8     Microalb/Crt.  Ratio (mcg/mg creat)   Date Value   05/11/2021 CANNOT BE CALCULATED     LDL Cholesterol (mg/dL)   Date Value   10/05/2021 19     AST (U/L)   Date Value   05/10/2021 35     ALT (U/L)   Date Value   05/10/2021 24     BUN (mg/dL)   Date Value   10/05/2021 28 (H)      (goal A1C is < 7)   (goal LDL is <100) need 30-50% reduction from baseline     BP Readings from Last 3 Encounters:   01/27/22 114/68   10/22/21 136/68   10/05/21 134/82    (goal /80)      All Future Testing planned in [facility]:  Lab Frequency Next Occurrence   CBC Auto Differential Once 07/26/2022   ALT Once 07/27/2022   AST Once 56/33/3057   Basic Metabolic Panel Once 54/07/2264   Hemoglobin A1C Once 07/26/2022   Lipid Panel Once 07/26/2022   Microalbumin, Ur Once 07/26/2022   EKG 12 lead Once 05/05/2022       Next Visit Date:  Future Appointments   Date Time Provider Vinayak Cornell   5/12/2022  1:45 PM ADRIAN Tate - CNP [facility]   7/28/2022 12:00 PM ADRIAN Srinivasan - CNP [facility]            Patient Active Problem List:     Liver disease     Chronic obstructive pulmonary disease, unspecified COPD type ([facility] [address]     Diabetes mellitus ([facility] [address]     Essential hypertension

## 2022-05-10 NOTE — TELEPHONE ENCOUNTER
Controlled Substance Monitoring:    Acute and Chronic Pain Monitoring:   RX Monitoring 5/10/2022   Periodic Controlled Substance Monitoring No signs of potential drug abuse or diversion identified.

## 2022-05-12 ENCOUNTER — ANESTHESIA EVENT (OUTPATIENT)
Dept: OPERATING ROOM | Age: 57
End: 2022-05-12
Payer: MEDICAID

## 2022-05-12 ENCOUNTER — OFFICE VISIT (OUTPATIENT)
Dept: PULMONOLOGY | Age: 57
End: 2022-05-12
Payer: MEDICAID

## 2022-05-12 ENCOUNTER — HOSPITAL ENCOUNTER (OUTPATIENT)
Age: 57
Discharge: HOME | End: 2022-05-12
Payer: MEDICAID

## 2022-05-12 VITALS
TEMPERATURE: 97.8 F | OXYGEN SATURATION: 98 % | WEIGHT: 303.8 LBS | SYSTOLIC BLOOD PRESSURE: 128 MMHG | HEART RATE: 73 BPM | DIASTOLIC BLOOD PRESSURE: 73 MMHG | HEIGHT: 72 IN | BODY MASS INDEX: 41.15 KG/M2 | RESPIRATION RATE: 16 BRPM

## 2022-05-12 DIAGNOSIS — Z87.891 PERSONAL HISTORY OF TOBACCO USE: ICD-10-CM

## 2022-05-12 DIAGNOSIS — E66.01 MORBID OBESITY DUE TO EXCESS CALORIES (HCC): ICD-10-CM

## 2022-05-12 DIAGNOSIS — E66.09 OBESITY DUE TO EXCESS CALORIES, UNSPECIFIED OBESITY SEVERITY: ICD-10-CM

## 2022-05-12 DIAGNOSIS — R91.1 LUNG NODULE: ICD-10-CM

## 2022-05-12 DIAGNOSIS — R91.8 MULTIPLE NODULES OF LUNG: ICD-10-CM

## 2022-05-12 DIAGNOSIS — Z01.818 PRE-OP TESTING: ICD-10-CM

## 2022-05-12 DIAGNOSIS — J44.9 CHRONIC OBSTRUCTIVE PULMONARY DISEASE, UNSPECIFIED COPD TYPE (HCC): ICD-10-CM

## 2022-05-12 DIAGNOSIS — J41.0 SIMPLE CHRONIC BRONCHITIS (HCC): Primary | ICD-10-CM

## 2022-05-12 LAB
EKG ATRIAL RATE: 75 BPM
EKG P AXIS: 60 DEGREES
EKG P-R INTERVAL: 144 MS
EKG Q-T INTERVAL: 432 MS
EKG QRS DURATION: 168 MS
EKG QTC CALCULATION (BAZETT): 482 MS
EKG R AXIS: 140 DEGREES
EKG T AXIS: 24 DEGREES
EKG VENTRICULAR RATE: 75 BPM

## 2022-05-12 PROCEDURE — 3023F SPIROM DOC REV: CPT | Performed by: NURSE PRACTITIONER

## 2022-05-12 PROCEDURE — G8417 CALC BMI ABV UP PARAM F/U: HCPCS | Performed by: NURSE PRACTITIONER

## 2022-05-12 PROCEDURE — 93010 ELECTROCARDIOGRAM REPORT: CPT | Performed by: INTERNAL MEDICINE

## 2022-05-12 PROCEDURE — 4004F PT TOBACCO SCREEN RCVD TLK: CPT | Performed by: NURSE PRACTITIONER

## 2022-05-12 PROCEDURE — 3017F COLORECTAL CA SCREEN DOC REV: CPT | Performed by: NURSE PRACTITIONER

## 2022-05-12 PROCEDURE — 93005 ELECTROCARDIOGRAM TRACING: CPT

## 2022-05-12 PROCEDURE — 99214 OFFICE O/P EST MOD 30 MIN: CPT | Performed by: NURSE PRACTITIONER

## 2022-05-12 PROCEDURE — G8427 DOCREV CUR MEDS BY ELIG CLIN: HCPCS | Performed by: NURSE PRACTITIONER

## 2022-05-12 PROCEDURE — G0296 VISIT TO DETERM LDCT ELIG: HCPCS | Performed by: NURSE PRACTITIONER

## 2022-05-12 RX ORDER — BUDESONIDE AND FORMOTEROL FUMARATE DIHYDRATE 160; 4.5 UG/1; UG/1
AEROSOL RESPIRATORY (INHALATION)
Qty: 11 G | Refills: 11 | Status: SHIPPED | OUTPATIENT
Start: 2022-05-12 | End: 2022-10-26

## 2022-05-12 RX ORDER — IPRATROPIUM BROMIDE AND ALBUTEROL SULFATE 2.5; .5 MG/3ML; MG/3ML
1 SOLUTION RESPIRATORY (INHALATION) EVERY 6 HOURS PRN
Qty: 360 ML | Refills: 11 | Status: SHIPPED | OUTPATIENT
Start: 2022-05-12

## 2022-05-12 ASSESSMENT — ENCOUNTER SYMPTOMS
ALLERGIC/IMMUNOLOGIC NEGATIVE: 1
EYES NEGATIVE: 1
GASTROINTESTINAL NEGATIVE: 1

## 2022-05-12 NOTE — PATIENT INSTRUCTIONS
SURVEY:    You may be receiving a survey from [employer] regarding your visit today. Please complete the survey to enable us to provide the highest quality of care to you and your family. If you cannot score us a very good on any question, please call the office to discuss how we could have made your experience a very good one. Thank you. What is lung cancer screening? Lung cancer screening is a way in which doctors check the lungs for early signs of cancer in people who have no symptoms of lung cancer. A low-dose CT scan uses much less radiation than a normal CT scan and shows a more detailed image of the lungs than a standard X-ray. The goal of lung cancer screening is to find cancer early, before it has a chance to grow, spread, or cause problems. One large study found that smokers who were screened with low-dose CT scans were less likely to die of lung cancer than those who were screened with standard X-ray. Below is a summary of the things you need to know regarding screening for lung cancer with low-dose computed tomography (LDCT). This is a screening program that involves routine annual screening with LDCT studies of the lung. The LDCTs are done using low-dose radiation that is not thought to increase your cancer risk. If you have other serious medical conditions (other cancers, congestive heart failure) that limit your life expectancy to less than 10 years, you should not undergo lung cancer screening with LDCT. The chance is 20%-60% that the LDCT result will show abnormalities. This would require additional testing which could include repeat imaging or even invasive procedures. Most (about 95%) of \"abnormal\" LDCT results are false in the sense that no lung cancer is ultimately found. Additionally, some (about 10%) of the cancers found would not affect your life expectancy, even if undetected and untreated.   If you are still smoking, the single most important thing that you can do to reduce your risk of dying of lung cancer is to quit. For this screening to be covered by Medicare and most other insurers, strict criteria must be met. If you do not meet these criteria, but still wish to undergo LDCT testing, you will be required to sign a waiver indicating your willingness to pay for the scan.

## 2022-05-13 ENCOUNTER — HOSPITAL ENCOUNTER (OUTPATIENT)
Age: 57
Setting detail: OUTPATIENT SURGERY
Discharge: HOME | End: 2022-05-13
Attending: SURGERY | Admitting: SURGERY
Payer: MEDICAID

## 2022-05-13 ENCOUNTER — ANESTHESIA (OUTPATIENT)
Dept: OPERATING ROOM | Age: 57
End: 2022-05-13
Payer: MEDICAID

## 2022-05-13 VITALS
BODY MASS INDEX: 41.04 KG/M2 | RESPIRATION RATE: 20 BRPM | OXYGEN SATURATION: 94 % | DIASTOLIC BLOOD PRESSURE: 73 MMHG | HEIGHT: 72 IN | WEIGHT: 303 LBS | SYSTOLIC BLOOD PRESSURE: 133 MMHG | HEART RATE: 68 BPM | TEMPERATURE: 97 F

## 2022-05-13 VITALS
RESPIRATION RATE: 21 BRPM | DIASTOLIC BLOOD PRESSURE: 51 MMHG | SYSTOLIC BLOOD PRESSURE: 116 MMHG | OXYGEN SATURATION: 94 %

## 2022-05-13 PROBLEM — F17.200 SMOKER: Chronic | Status: ACTIVE | Noted: 2022-05-13

## 2022-05-13 PROBLEM — Z12.11 SCREEN FOR COLON CANCER: Status: ACTIVE | Noted: 2022-05-13

## 2022-05-13 PROBLEM — E66.01 MORBID OBESITY WITH BMI OF 40.0-44.9, ADULT (HCC): Chronic | Status: ACTIVE | Noted: 2022-05-13

## 2022-05-13 LAB — GLUCOSE BLD-MCNC: 134 MG/DL (ref 74–100)

## 2022-05-13 PROCEDURE — 7100000010 HC PHASE II RECOVERY - FIRST 15 MIN: Performed by: SURGERY

## 2022-05-13 PROCEDURE — 3700000000 HC ANESTHESIA ATTENDED CARE: Performed by: SURGERY

## 2022-05-13 PROCEDURE — 2580000003 HC RX 258: Performed by: NURSE ANESTHETIST, CERTIFIED REGISTERED

## 2022-05-13 PROCEDURE — 3609027000 HC COLONOSCOPY: Performed by: SURGERY

## 2022-05-13 PROCEDURE — 2500000003 HC RX 250 WO HCPCS: Performed by: NURSE ANESTHETIST, CERTIFIED REGISTERED

## 2022-05-13 PROCEDURE — 6360000002 HC RX W HCPCS: Performed by: NURSE ANESTHETIST, CERTIFIED REGISTERED

## 2022-05-13 PROCEDURE — 2709999900 HC NON-CHARGEABLE SUPPLY: Performed by: SURGERY

## 2022-05-13 PROCEDURE — 7100000011 HC PHASE II RECOVERY - ADDTL 15 MIN: Performed by: SURGERY

## 2022-05-13 PROCEDURE — 82947 ASSAY GLUCOSE BLOOD QUANT: CPT

## 2022-05-13 PROCEDURE — 45378 DIAGNOSTIC COLONOSCOPY: CPT | Performed by: SURGERY

## 2022-05-13 PROCEDURE — 3700000001 HC ADD 15 MINUTES (ANESTHESIA): Performed by: SURGERY

## 2022-05-13 RX ORDER — SODIUM CHLORIDE, SODIUM LACTATE, POTASSIUM CHLORIDE, CALCIUM CHLORIDE 600; 310; 30; 20 MG/100ML; MG/100ML; MG/100ML; MG/100ML
INJECTION, SOLUTION INTRAVENOUS CONTINUOUS
Status: DISCONTINUED | OUTPATIENT
Start: 2022-05-13 | End: 2022-05-13 | Stop reason: HOSPADM

## 2022-05-13 RX ORDER — PROPOFOL 10 MG/ML
INJECTION, EMULSION INTRAVENOUS CONTINUOUS PRN
Status: DISCONTINUED | OUTPATIENT
Start: 2022-05-13 | End: 2022-05-13 | Stop reason: SDUPTHER

## 2022-05-13 RX ORDER — LIDOCAINE HYDROCHLORIDE 20 MG/ML
INJECTION, SOLUTION EPIDURAL; INFILTRATION; INTRACAUDAL; PERINEURAL PRN
Status: DISCONTINUED | OUTPATIENT
Start: 2022-05-13 | End: 2022-05-13 | Stop reason: SDUPTHER

## 2022-05-13 RX ADMIN — PROPOFOL 250 MCG/KG/MIN: 10 INJECTION, EMULSION INTRAVENOUS at 15:37

## 2022-05-13 RX ADMIN — SODIUM CHLORIDE, POTASSIUM CHLORIDE, SODIUM LACTATE AND CALCIUM CHLORIDE: 600; 310; 30; 20 INJECTION, SOLUTION INTRAVENOUS at 15:09

## 2022-05-13 RX ADMIN — LIDOCAINE HYDROCHLORIDE 4 ML: 20 INJECTION, SOLUTION EPIDURAL; INFILTRATION; INTRACAUDAL; PERINEURAL at 15:37

## 2022-05-13 ASSESSMENT — LIFESTYLE VARIABLES: SMOKING_STATUS: 1

## 2022-05-13 ASSESSMENT — PAIN - FUNCTIONAL ASSESSMENT: PAIN_FUNCTIONAL_ASSESSMENT: NONE - DENIES PAIN

## 2022-05-13 ASSESSMENT — ENCOUNTER SYMPTOMS: SHORTNESS OF BREATH: 1

## 2022-05-13 NOTE — OP NOTE
Operative Note      Patient: Yasmin Hernandez  YOB: 1965  MRN: R Litzy 11 Might, APRN - CNP      Date of Procedure: 5/13/2022    Pre-Op Diagnosis: screening colonoscopy overdue (none prior)    Post-Op Diagnosis: Same       Procedure:    Colonoscopy to cecum      Surgeon(s):  Reny Fitzgerald DO    Anesthesia: Monitor Anesthesia Care    Estimated Blood Loss (mL): none    Complications: None    Specimens: none    Procedure details:    I do meet with the patient in preoperative holding area. Please see H&P for indications for the above named procedure. Patient was brought to the endoscopy suite and placed under monitored anesthesia. Patient was positioned in left lateral position. Time out called and procedure confirmed. The lubricated variable stiffness pediatric colonoscope was carefully passed under direct vision into the rectum, advanced through the sigmoid colon, transverse colon, ascending colon and the cecum. The ileocecal valve was well visualized. Additional findings:    Findings:     Cecum: normal cecal pouch. IC valve and appendiceal orifice well confirmed  Ascending: normal  Transverse: normal  Descending: normal  Sigmoid: normal  Rectum: normal  Rectal exam: negative  Bowel prep quality: excellent    At the distal 1/3 of the rectum, the scope was retroflexed and was negative. The patient tolerated the procedure well. The abdomen was soft after the procedure. I spoke with pt afterwards.      Next colonoscopy for screening: 10 years    Electronically signed by Reny Fitzgerald DO, FACOS, FACS on 5/13/2022 at 7:48 PM

## 2022-05-13 NOTE — ANESTHESIA PRE PROCEDURE
Department of Anesthesiology  Preprocedure Note       Name:  Verner Schultze   Age:  61 y.o.  :  1965                                          MRN:  [id number]         Date:  2022      Surgeon: Otis Mott):  Mauri Gandhi DO    Procedure: Procedure(s):  COLORECTAL CANCER SCREENING, NOT HIGH RISK    Medications prior to admission:   Prior to Admission medications    Medication Sig Start Date End Date Taking? Authorizing Provider   ipratropium-albuterol (DUONEB) 0.5-2.5 (3) MG/3ML SOLN nebulizer solution Inhale 3 mLs into the lungs every 6 hours as needed for Shortness of Breath 22   ADRIAN Cunningham CNP   SYMBICORT 160-4.5 MCG/ACT AERO Inhale 1 puff by mouth once daily 22   ADRIAN Cunningham CNP   zolpidem (AMBIEN) 10 MG tablet Take 1 tablet by mouth nightly as needed for Sleep for up to 90 days.  5/10/22 8/8/22  ADRIAN Pérez CNP   naproxen (NAPROSYN) 500 MG tablet  5/10/22   Ishmael Boo MD   albuterol (PROVENTIL) (2.5 MG/3ML) 0.083% nebulizer solution USE 1 VIAL IN NEBULIZER EVERY 6 HOURS AS NEEDED FOR WHEEZING 22   ADRIAN Pérez CNP   metFORMIN (GLUCOPHAGE-XR) 500 MG extended release tablet TAKE 2 TABLETS BY MOUTH ONCE DAILY WITH BREAKFAST 22   ADRIAN Pérez CNP   omeprazole (PRILOSEC) 40 MG delayed release capsule TAKE 1 CAPSULE BY MOUTH ONCE DAILY FOR 30 DAYS 21   Historical Provider, MD   polyethylene glycol (GLYCOLAX) 17 GM/SCOOP powder  11/10/21   Historical Provider, MD   insulin glargine (LANTUS SOLOSTAR) 100 UNIT/ML injection pen Inject 24 Units into the skin 2 times daily 21   ADRIAN Brewster CNP   atorvastatin (LIPITOR) 40 MG tablet Take 1 tablet by mouth daily  Patient taking differently: Take 40 mg by mouth nightly  21   ADRIAN Pérez CNP   TRUEplus Lancets 30G MISC USE 1 TO CHECK GLUCOSE 10/22/21   Faiza Segundo APRN - CNP   buPROPion (WELLBUTRIN XL) 150 MG extended release tablet TAKE 1 TABLET BY MOUTH ONCE DAILY IN THE MORNING 8/30/21   ADRIAN Holland CNP   Insulin Pen Needle 31G X 6 MM MISC 1 each by Does not apply route 2 times daily 7/15/21   ADRIAN Holland CNP   TRUE METRIX BLOOD GLUCOSE TEST strip USE 1 STRIP TO CHECK GLUCOSE 6/23/21   ADRIAN Holland CNP   carvedilol (COREG) 3.125 MG tablet Take 1 tablet by mouth 2 times daily 6/9/21   ADRIAN Holland CNP   furosemide (LASIX) 40 MG tablet Take 1 tablet by mouth once daily  Patient taking differently: 40 mg Patient PRN, legs swell 6/9/21   ADRIAN Holland CNP   SITagliptin (JANUVIA) 100 MG tablet Take 1 tablet by mouth daily 6/9/21   ADRIAN Holland CNP   losartan (COZAAR) 25 MG tablet Take 1 tablet by mouth daily 6/9/21   ADRIAN Holland CNP   Blood Glucose Monitoring Suppl (TRUE METRIX AIR GLUCOSE METER) w/Device KIT USE TO CHECK GLUCOSE DAILY 5/7/21   Historical Provider, MD   blood glucose monitor kit and supplies Dispense sufficient amount for indicated testing frequency plus additional to accommodate PRN testing needs. Dispense all needed supplies to include: monitor, strips, lancing device, lancets, control solutions, alcohol swabs.  5/7/21   ADRIAN Welch CNP   triamterene-hydroCHLOROthiazide (MAXZIDE-25) 37.5-25 MG per tablet Take 1 tablet by mouth daily 12/28/20   ADRIAN Holland CNP   Respiratory Therapy Supplies (NEBULIZER/TUBING/MOUTHPIECE) KIT 1 kit by Does not apply route daily as needed (SOB/Wheezing) 12/18/20   ADRIAN Holland CNP   Nebulizers (COMPRESSOR/NEBULIZER) MISC 1 each by Does not apply route daily as needed (SOB/Wheezing) 12/18/20   ADRIAN Holland CNP   oxymetazoline (AFRIN) 0.05 % nasal spray 2 sprays by Nasal route 2 times daily     Historical Provider, MD       Current medications:    Current Facility-Administered Medications   Medication Dose Route Frequency Provider Last Rate Last Admin    lactated ringers infusion   IntraVENous Continuous Alysha Mccauley Tran Zheng, APRN - CRNA 100 mL/hr at 05/13/22 1509 New Bag at 05/13/22 1509       Allergies:     Allergies   Allergen Reactions    Pcn [Penicillins] Rash       Problem List:    Patient Active Problem List   Diagnosis Code    Liver disease K76.9    Chronic obstructive pulmonary disease, unspecified COPD type ([facility] J44.9    Diabetes mellitus ([facility] E11.9    Essential hypertension I10       Past Medical History:        Diagnosis Date    COPD (chronic obstructive pulmonary disease) ([facility]     Hypertension     Liver disease        Past Surgical History:        Procedure Laterality Date    ANKLE SURGERY      CARPAL TUNNEL RELEASE Bilateral     HERNIA REPAIR      KNEE ARTHROSCOPY      x's 5    NECK SURGERY         Social History:    Social History     Tobacco Use    Smoking status: Current Some Day Smoker     Packs/day: 0.50     Years: 42.00     Pack years: 21.00     Types: Cigarettes    Smokeless tobacco: Never Used   Substance Use Topics    Alcohol use: Not Currently     Comment: rarely-hx of heavy alcohol use                                Ready to quit: Not Answered  Counseling given: Not Answered      Vital Signs (Current):   Vitals:    05/03/22 1139 05/13/22 1456   BP:  (!) 158/89   Pulse:  74   Resp:  16   Temp:  37 °C (98.6 °F)   TempSrc:  Temporal   SpO2:  (!) 87%   Weight: 295 lb (133.8 kg) (!) 303 lb (137.4 kg)   Height: 5' 11\" (1.803 m) 5' 11.5\" (1.816 m)                                              BP Readings from Last 3 Encounters:   05/13/22 (!) 158/89   05/12/22 128/73   01/27/22 114/68       NPO Status: Time of last liquid consumption: 0730                        Time of last solid consumption: 2560 (slices of ham and cheese)                        Date of last liquid consumption: 05/13/22                        Date of last solid food consumption: 05/13/22    BMI:   Wt Readings from Last 3 Encounters:   05/13/22 (!) 303 lb (137.4 kg)   05/12/22 (!) 303 lb 12.8 oz (137.8 kg)   01/27/22 281 lb 11.2 oz (127.8 kg)     Body mass index is 41.67 kg/m². CBC:   Lab Results   Component Value Date    WBC 10.6 05/10/2021    RBC 5.52 05/10/2021    HGB 17.4 05/10/2021    HCT 53.3 05/10/2021    MCV 96.6 05/10/2021    RDW 14.1 05/10/2021     05/10/2021       CMP:   Lab Results   Component Value Date     10/05/2021    K 4.3 10/05/2021     10/05/2021    CO2 27 10/05/2021    BUN 28 10/05/2021    CREATININE 0.87 10/05/2021    GFRAA >60 10/05/2021    LABGLOM >60 10/05/2021    GLUCOSE 122 10/05/2021    PROT 7.5 05/10/2021    CALCIUM 9.0 10/05/2021    BILITOT 0.45 05/10/2021    ALKPHOS 127 05/10/2021    AST 35 05/10/2021    ALT 24 05/10/2021       POC Tests:   Recent Labs     05/13/22  1504   POCGLU 134*       Coags:   Lab Results   Component Value Date    PROTIME 12.4 09/04/2020    INR 1.0 09/04/2020       HCG (If Applicable): No results found for: PREGTESTUR, PREGSERUM, HCG, HCGQUANT     ABGs: No results found for: PHART, PO2ART, SZE3KPI, HVA8QFJ, BEART, H9IOAUVQ     Type & Screen (If Applicable):  No results found for: LABABO, LABRH    Drug/Infectious Status (If Applicable):  Lab Results   Component Value Date    HEPCAB NONREACTIVE 03/23/2020       COVID-19 Screening (If Applicable):   Lab Results   Component Value Date    COVID19 Not Detected 04/01/2021    COVID19 Not Detected 06/19/2020           Anesthesia Evaluation  Patient summary reviewed and Nursing notes reviewed no history of anesthetic complications:   Airway: Mallampati: II        Dental:    (+) upper dentures      Pulmonary:normal exam    (+) COPD (at home O2- 2lnc):  shortness of breath: chronic,  current smoker (.5ppd)                          ROS comment: Per patient' at baseline for respiratory    CT lung screening 5/2/2022: No mediastinal hilar or axillary adenopathy. Anterior segment left upper lobe micronodule is unchanged. Solid noncalcified 9.3 mm right lower lobe nodule measured 8.5 mm in February 2020.   Not FDG avid on PET scan in March 2020. No significant new nodules or masses. PFT 6/22/2020: Patient spirometry shows an obstructive flow volume loop with an FEV1 of 41% of predicted with an 11% postbronchodilator change to 46% of predicted. FVC is 2.74, 54% of predicted with a 10% bronchodilator change to 3.10 L. FEV1/FVC ratio is 58. % of predicted. Total lung capacity 115% of predicted. Diffusion capacity when corrected for alveolar volume is normal.  Final impression: Stage III COPD with a bronchodilator response. Air trapping with mild to moderately decreased diffusion capacity. PE comment: diminished but clear Cardiovascular:  Exercise tolerance: poor (<4 METS),   (+) hypertension:,       ECG reviewed               Beta Blocker:  Dose within 24 Hrs      ROS comment: 5/5/22  Normal sinus rhythm  Right bundle branch block  Left posterior fascicular block   Bifascicular block   Abnormal ECG  When compared with ECG of 14-OCT-2010 16:02,  (RBBB and left posterior fascicular block) has replaced Incomplete right bundle branch block  Confirmed by Victor Hugo Butler MD, Janie Loera ([id number]) on 5/12/2022 10:14:38 PM     Neuro/Psych:   Negative Neuro/Psych ROS              GI/Hepatic/Renal:   (+) GERD: well controlled, liver disease:,           Endo/Other:    (+) DiabetesType II DM, using insulin, . Abdominal:             Vascular: negative vascular ROS. Other Findings:             Anesthesia Plan      general and TIVA     ASA 3       Induction: intravenous. Anesthetic plan and risks discussed with patient. Plan discussed with CRNA.                   ADRIAN Tate - CASANDRA   5/13/2022

## 2022-05-13 NOTE — PROGRESS NOTES
Dr. Natty Garrett visits with patient, reviews findings of colonoscopy. Patient verbalizes understanding and offers no questions at this time.

## 2022-05-13 NOTE — ANESTHESIA POSTPROCEDURE EVALUATION
Department of Anesthesiology  Postprocedure Note    Patient: Iain Ayala  MRN: [id number]  YOB: 1965  Date of evaluation: 5/13/2022  Time:  4:24 PM     Procedure Summary     Date: 05/13/22 Room / Location: [address] [place] [address]    Anesthesia Start: [facility] Anesthesia Stop: [id number]    Procedure: [address], NOT HIGH RISK (N/A Abdomen) Diagnosis: (SCREENING)    Surgeons: Tangela Paz DO Responsible Provider: ADRIAN Campoverde CRNA    Anesthesia Type: general, TIVA ASA Status: 3          Anesthesia Type: No value filed. Sincere Phase I:      Sincere Phase II: Sincere Score: 10    Last vitals: Reviewed and per EMR flowsheets.        Anesthesia Post Evaluation    Patient location during evaluation: PACU  Patient participation: complete - patient participated  Level of consciousness: awake and alert  Pain score: 0  Airway patency: patent  Nausea & Vomiting: no nausea and no vomiting  Complications: no  Cardiovascular status: hemodynamically stable and blood pressure returned to baseline  Respiratory status: acceptable  Hydration status: euvolemic  Multimodal analgesia pain management approach

## 2022-05-13 NOTE — PROGRESS NOTES
Discharge instructions given to patient and patient family, both verbalize understanding and offer no questions at this time. Discharge Criteria    Inpatients must meet Criteria 1 through 7. All other patients are either YES or N/A. If a NO is chosen then Anesthesia or Surgeon must be notified. 1.  Minimum 30 minutes after last dose of sedative medication, minimum 120 minutes after last dose of reversal agent. Yes      2. Systolic BP stable within 20 mmHg for 30 minutes & systolic BP between 90 & 566 or within 10 mmHg of baseline. Yes      3. Pulse between 60 and 100 or within 10 bpm of baseline. Yes      4. Spontaneous respiratory rate >/= 10 per minute. Yes      5. SaO2 >/= 95 or  >/= baseline. Yes      6. Able to cough and swallow or return to baseline function. Yes      7. Alert and oriented or return to baseline mental status. Yes      8. Demonstrates controlled, coordinated movements, ambulates with steady gait, or return to baseline activity function. Yes      9. Minimal or no pain or nausea, or at a level tolerable and acceptable to patient. Yes      10. Takes and retains oral fluids as allowed. Yes      11. Procedural / perioperative site stable. Minimal or no bleeding. Yes          12. If GI endoscopy procedure, minimal or no abdominal distention or passing flatus. Yes      13. Written discharge instructions and emergency telephone number provided. Yes      14. Accompanied by a responsible adult.     Yes

## 2022-05-13 NOTE — BRIEF OP NOTE
Brief Postoperative Note      Patient: Jc Nye  YOB: 1965  MRN: SEAN Mcghee 11 Might, APRN - CNP      Date of Procedure: 5/13/2022    Pre-Op Diagnosis: screening colonoscopy overdue (none prior)    Post-Op Diagnosis: Same       Procedure:    Colonoscopy to cecum      Surgeon(s):  Maricel Parsons DO    Anesthesia: Monitor Anesthesia Care    Estimated Blood Loss (mL): none    Complications: None    Specimens: none    Electronically signed by Maricel Parsons DO, FACOS, FACS on 5/13/2022 at 7:48 PM

## 2022-05-13 NOTE — PROGRESS NOTES
[facility] notified that pt did not follow bowel prep correctly and states he ate ham and cheese at midnight and his last BM was \"brown and kind of liquidy. \"

## 2022-05-13 NOTE — H&P
GENERAL SURGERY CONSULTATION      Patient's Name/ Date of Birth/ Gender: Inez Blandon / 1965 (61 y.o.) / male     PCP: ADRIAN Stevens CNP  Referring:     History of present Illness:  Patient is a pleasant 61 y.o. male  kindly referred by ADRIAN Stevens CNP   Direct referral   Due for screening colonoscopy    Past Medical History:  has a past medical history of COPD (chronic obstructive pulmonary disease) ([address] Hypertension, and Liver disease. Past Surgical History:   Past Surgical History:   Procedure Laterality Date    ANKLE SURGERY      CARPAL TUNNEL RELEASE Bilateral     HERNIA REPAIR      KNEE ARTHROSCOPY      x's 5    NECK SURGERY         Social History:  reports that he has been smoking cigarettes. He has a 21.00 pack-year smoking history. He has never used smokeless tobacco. He reports previous alcohol use. He reports current drug use. Drug: Marijuana Sascha Divine). Family History: family history includes Allergies in his mother; Diabetes in his father; Esophageal Cancer in his father.     Review of Systems:   General: Completed and, except as mentioned above, was negative or noncontributory  Psychological:  Completed and, except as mentioned above, was negative or noncontributory  Ophthalmic:  Completed and, except as mentioned above, was negative or noncontributory  ENT:  Completed and, except as mentioned above, was negative or noncontributory  Allergy and Immunology:  Completed and, except as mentioned above, was negative or noncontributory  Hematological and Lymphatic:  Completed and, except as mentioned above, was negative or noncontributory  Endocrine: Completed and, except as mentioned above, was negative or noncontributory  Breast:  Completed and, except as mentioned above, was negative or noncontributory  Respiratory:  Completed and, except as mentioned above, was negative or noncontributory  Cardiovascular:  Completed and, except as mentioned above, was negative or noncontributory  Gastrointestinal: Completed and, except as mentioned above, was negative or noncontributory  Genito-Urinary:  Completed and, except as mentioned above, was negative or noncontributory  Musculoskeletal:  Completed and, except as mentioned above, was negative or noncontributory  Neurological:  Completed and, except as mentioned above, was negative or noncontributory  Dermatological:  Completed and, except as mentioned above, was negative or noncontributory    Allergies: Pcn [penicillins]    Current Meds:  Current Facility-Administered Medications:     lactated ringers infusion, , IntraVENous, Continuous, Miladys Eastman, APRN - CRNA, Last Rate: 100 mL/hr at 05/13/22 1509, New Bag at 05/13/22 1509    Physical Exam:  Vital signs and Nurse's note reviewed. BP (!) 158/89   Pulse 74   Temp 98.6 °F (37 °C) (Temporal)   Resp 16   Ht 5' 11.5\" (1.816 m)   Wt (!) 303 lb (137.4 kg)   SpO2 (!) 87% Comment: pt states he wears O2 at home- pt placed on 2.5L O2 canula  BMI 41.67 kg/m²    height is 5' 11.5\" (1.816 m) and weight is 303 lb (137.4 kg) (abnormal). His temporal temperature is 98.6 °F (37 °C). His blood pressure is 158/89 (abnormal) and his pulse is 74. His respiration is 16 and oxygen saturation is 87% (abnormal). Gen:  A&Ox3, NAD. Pleasant and cooperative.   HEENT: PERRLA, EOMI, no scleral icterus  Neck:  no goiter  CVS: Regular rate and rhythm  Resp: Good bilateral air entry, no active wheezing, no labored breathing  Abd: soft, non-tender, non-distended, bowel sounds present rectal exam to be done at scope  Ext: Moves all extremities, no gross focal motor deficits  Skin: No erythema or ulcerations     Labs:   Lab Results   Component Value Date    WBC 10.6 05/10/2021    HGB 17.4 05/10/2021    HCT 53.3 05/10/2021    MCV 96.6 05/10/2021     05/10/2021     Lab Results   Component Value Date     10/05/2021    K 4.3 10/05/2021     10/05/2021    CO2 27 10/05/2021    BUN 28 10/05/2021 CREATININE 0.87 10/05/2021    GLUCOSE 122 10/05/2021    CALCIUM 9.0 10/05/2021     Lab Results   Component Value Date    ALKPHOS 127 05/10/2021    ALT 24 05/10/2021    AST 35 05/10/2021    PROT 7.5 05/10/2021    BILITOT 0.45 05/10/2021    BILIDIR <0.08 05/10/2021    LABALBU 3.8 05/10/2021     No results found for: AMYLASE  No results found for: LIPASE  Lab Results   Component Value Date    INR 1.0 09/04/2020       Radiologic Studies:      Impressions/Recommendations:     Screening colonoscopy  Risks and benefits of colonoscopy have been discussed in detail with the patient. Risks of the procedure include bleeding, bowel perforation, possibly missing smaller lesions if the bowel prep is not adequate. Alternative studies include barium enema and virtual colonoscopy; however, if a lesion is seen, then one would still need to proceed with the gold standard colonoscopy to retrieve or biopsy the lesion. All the patient's questions are answered. Will proceed with colonoscopy under MAC. H&P  General Surgery        Pt Name: Shira Hubbard  MRN: [id number]  YOB: 1965  Date of evaluation: 5/13/2022      [x] I have examined the patient and reviewed the H&P/Consult completed, and there are no changes to the patient or plans. [] I have examined the patient and reviewed the H&P/Consult and have noted the following changes: The patient was counseled at length about the risks of jigar Covid-19 during their perioperative period and any recovery window from their procedure. The patient was made aware that jigar Covid-19  may worsen their prognosis for recovering from their procedure  and lend to a higher morbidity and/or mortality risk. All material risks, benefits, and reasonable alternatives including postponing the procedure were discussed. The patient does wish to proceed with the procedure at this time.         Electronically signed by Mulugeta Noyola DO  on 5/13/2022 at 3:21 PM Thank you ADRIAN Song CNP for allowing me to participate in the care of your patients.      Coreen Reyes DO, MPH, [facility] [address], [address] [place] [address] office [phone]  [place] office [phone]

## 2022-05-16 DIAGNOSIS — I10 ESSENTIAL HYPERTENSION: ICD-10-CM

## 2022-05-16 DIAGNOSIS — Z79.4 TYPE 2 DIABETES MELLITUS WITH OTHER SPECIFIED COMPLICATION, WITH LONG-TERM CURRENT USE OF INSULIN (HCC): ICD-10-CM

## 2022-05-16 DIAGNOSIS — E11.69 TYPE 2 DIABETES MELLITUS WITH OTHER SPECIFIED COMPLICATION, WITH LONG-TERM CURRENT USE OF INSULIN (HCC): ICD-10-CM

## 2022-05-16 RX ORDER — POLYETHYLENE GLYCOL 3350 17 G/17G
34 POWDER, FOR SOLUTION ORAL DAILY
Qty: 850 G | Refills: 5 | Status: SHIPPED | OUTPATIENT
Start: 2022-05-16

## 2022-05-16 RX ORDER — LOSARTAN POTASSIUM 25 MG/1
TABLET ORAL
Qty: 90 TABLET | Refills: 3 | Status: SHIPPED | OUTPATIENT
Start: 2022-05-16

## 2022-05-16 RX ORDER — SITAGLIPTIN 100 MG/1
TABLET, FILM COATED ORAL
Qty: 90 TABLET | Refills: 3 | Status: SHIPPED | OUTPATIENT
Start: 2022-05-16

## 2022-05-16 RX ORDER — FUROSEMIDE 40 MG/1
TABLET ORAL
Qty: 90 TABLET | Refills: 3 | Status: SHIPPED | OUTPATIENT
Start: 2022-05-16

## 2022-05-16 NOTE — TELEPHONE ENCOUNTER
Health Maintenance   Topic Date Due    HIV screen  Never done    Hepatitis B vaccine (1 of 3 - Risk 3-dose series) Never done    Shingles vaccine (1 of 2) Never done    Diabetic microalbuminuria test  05/11/2022    Diabetic foot exam  06/09/2022 (Originally 4/24/1975)    Diabetic retinal exam  06/09/2022 (Originally 4/24/1982)    Pneumococcal 0-64 years Vaccine (1 - PCV) 06/09/2022 (Originally [birth year]1970)    Low dose CT lung screening  09/15/2022    Lipids  10/05/2022    A1C test (Diabetic or Prediabetic)  01/27/2023    Depression Screen  01/27/2023    DTaP/Tdap/Td vaccine (2 - Td or Tdap) 06/09/2031    Colorectal Cancer Screen  05/13/2032    Flu vaccine  Completed    COVID-19 Vaccine  Completed    Hepatitis C screen  Completed    Hepatitis A vaccine  Aged Out    Hib vaccine  Aged Out    Meningococcal (ACWY) vaccine  Aged Out             (applicable per patient's age: Cancer Screenings, Depression Screening, Fall Risk Screening, Immunizations)    Hemoglobin A1C (%)   Date Value   01/27/2022 6.6   10/05/2021 6.7 (H)   06/09/2021 12.8     Microalb/Crt.  Ratio (mcg/mg creat)   Date Value   05/11/2021 CANNOT BE CALCULATED     LDL Cholesterol (mg/dL)   Date Value   10/05/2021 19     AST (U/L)   Date Value   05/10/2021 35     ALT (U/L)   Date Value   05/10/2021 24     BUN (mg/dL)   Date Value   10/05/2021 28 (H)      (goal A1C is < 7)   (goal LDL is <100) need 30-50% reduction from baseline     BP Readings from Last 3 Encounters:   05/13/22 (!) 116/51   05/13/22 133/73   05/12/22 128/73    (goal /80)      All Future Testing planned in [facility]:  Lab Frequency Next Occurrence   CBC Auto Differential Once 07/26/2022   ALT Once 07/27/2022   AST Once 54/22/4602   Basic Metabolic Panel Once 53/59/1174   Hemoglobin A1C Once 07/26/2022   Lipid Panel Once 07/26/2022   Microalbumin, Ur Once 07/26/2022   CT Lung Screen (Annual) Once 05/02/2023       Next Visit Date:  Future Appointments   Date Time Provider Vinayak Cornell   7/28/2022 12:00 PM Job Segundo, APRN - CNP [facility]   11/16/2022  2:00 PM [facility]            Patient Active Problem List:     Liver disease     Chronic obstructive pulmonary disease, unspecified COPD type ([facility]     Diabetes mellitus ([facility]     Essential hypertension     Screen for colon cancer     Smoker     Morbid obesity with BMI of 40.0-44.9, adult ([facility]

## 2022-05-16 NOTE — TELEPHONE ENCOUNTER
Mendoza Stanley had recent colonoscopy with Dr. Rosanne Mallory. She recommended he double the amount of Miralax he takes daily. Will you prescribe?

## 2022-05-24 DIAGNOSIS — I10 ESSENTIAL HYPERTENSION: ICD-10-CM

## 2022-05-24 RX ORDER — CARVEDILOL 3.12 MG/1
TABLET ORAL
Qty: 180 TABLET | Refills: 3 | Status: SHIPPED | OUTPATIENT
Start: 2022-05-24

## 2022-05-24 NOTE — TELEPHONE ENCOUNTER
Health Maintenance   Topic Date Due    HIV screen  Never done    Hepatitis B vaccine (1 of 3 - Risk 3-dose series) Never done    Prostate Specific Antigen (PSA) Screening or Monitoring  Never done    Shingles vaccine (1 of 2) Never done    Diabetic microalbuminuria test  05/11/2022    Diabetic foot exam  06/09/2022 (Originally [birth year]1974)    Diabetic retinal exam  06/09/2022 (Originally 4/24/1982)    Pneumococcal 0-64 years Vaccine (1 - PCV) 06/09/2022 (Originally 4/24/1971)    Low dose CT lung screening  09/15/2022    Lipids  10/05/2022    A1C test (Diabetic or Prediabetic)  01/27/2023    Depression Screen  01/27/2023    DTaP/Tdap/Td vaccine (2 - Td or Tdap) 06/09/2031    Colorectal Cancer Screen  05/13/2032    Flu vaccine  Completed    COVID-19 Vaccine  Completed    Hepatitis C screen  Completed    Hepatitis A vaccine  Aged Out    Hib vaccine  Aged Out    Meningococcal (ACWY) vaccine  Aged Out             (applicable per patient's age: Cancer Screenings, Depression Screening, Fall Risk Screening, Immunizations)    Hemoglobin A1C (%)   Date Value   01/27/2022 6.6   10/05/2021 6.7 (H)   06/09/2021 12.8     Microalb/Crt.  Ratio (mcg/mg creat)   Date Value   05/11/2021 CANNOT BE CALCULATED     LDL Cholesterol (mg/dL)   Date Value   10/05/2021 19     AST (U/L)   Date Value   05/10/2021 35     ALT (U/L)   Date Value   05/10/2021 24     BUN (mg/dL)   Date Value   10/05/2021 28 (H)      (goal A1C is < 7)   (goal LDL is <100) need 30-50% reduction from baseline     BP Readings from Last 3 Encounters:   05/13/22 (!) 116/51   05/13/22 133/73   05/12/22 128/73    (goal /80)      All Future Testing planned in [facility]:  Lab Frequency Next Occurrence   CBC Auto Differential Once 07/26/2022   ALT Once 07/27/2022   AST Once 28/52/4855   Basic Metabolic Panel Once 85/27/3868   Hemoglobin A1C Once 07/26/2022   Lipid Panel Once 07/26/2022   Microalbumin, Ur Once 07/26/2022   CT Lung Screen (Annual) Once 05/02/2023       Next Visit Date:  Future Appointments   Date Time Provider Vinayak Cornell   7/28/2022 12:00 PM Ruth Segundo, APRN - CNP [facility]   11/16/2022  2:00 PM Terrance Gordillo DO [facility]            Patient Active Problem List:     Liver disease     Chronic obstructive pulmonary disease, unspecified COPD type ([facility]     Diabetes mellitus ([facility]     Essential hypertension     Screen for colon cancer     Smoker     Morbid obesity with BMI of 40.0-44.9, adult ([facility]

## 2022-05-31 DIAGNOSIS — Z79.4 TYPE 2 DIABETES MELLITUS WITH OTHER SPECIFIED COMPLICATION, WITH LONG-TERM CURRENT USE OF INSULIN (HCC): ICD-10-CM

## 2022-05-31 DIAGNOSIS — E11.69 TYPE 2 DIABETES MELLITUS WITH OTHER SPECIFIED COMPLICATION, WITH LONG-TERM CURRENT USE OF INSULIN (HCC): ICD-10-CM

## 2022-05-31 RX ORDER — INSULIN GLARGINE 100 [IU]/ML
24 INJECTION, SOLUTION SUBCUTANEOUS 2 TIMES DAILY
Qty: 5 PEN | Refills: 5 | Status: SHIPPED | OUTPATIENT
Start: 2022-05-31

## 2022-05-31 NOTE — TELEPHONE ENCOUNTER
Health Maintenance   Topic Date Due    HIV screen  Never done    Hepatitis B vaccine (1 of 3 - Risk 3-dose series) Never done    Prostate Specific Antigen (PSA) Screening or Monitoring  Never done    Shingles vaccine (1 of 2) Never done    Diabetic microalbuminuria test  05/11/2022    Diabetic foot exam  06/09/2022 (Originally [birth year]24[birth year]1974)    Diabetic retinal exam  06/09/2022 (Originally 4/24/1982)    Pneumococcal 0-64 years Vaccine (1 - PCV) 06/09/2022 (Originally 4/24/1971)    Low dose CT lung screening  09/15/2022    Lipids  10/05/2022    A1C test (Diabetic or Prediabetic)  01/27/2023    Depression Screen  01/27/2023    DTaP/Tdap/Td vaccine (2 - Td or Tdap) 06/09/2031    Colorectal Cancer Screen  05/13/2032    Flu vaccine  Completed    COVID-19 Vaccine  Completed    Hepatitis C screen  Completed    Hepatitis A vaccine  Aged Out    Hib vaccine  Aged Out    Meningococcal (ACWY) vaccine  Aged Out             (applicable per patient's age: Cancer Screenings, Depression Screening, Fall Risk Screening, Immunizations)    Hemoglobin A1C (%)   Date Value   01/27/2022 6.6   10/05/2021 6.7 (H)   06/09/2021 12.8     Microalb/Crt.  Ratio (mcg/mg creat)   Date Value   05/11/2021 CANNOT BE CALCULATED     LDL Cholesterol (mg/dL)   Date Value   10/05/2021 19     AST (U/L)   Date Value   05/10/2021 35     ALT (U/L)   Date Value   05/10/2021 24     BUN (mg/dL)   Date Value   10/05/2021 28 (H)      (goal A1C is < 7)   (goal LDL is <100) need 30-50% reduction from baseline     BP Readings from Last 3 Encounters:   05/13/22 (!) 116/51   05/13/22 133/73   05/12/22 128/73    (goal /80)      All Future Testing planned in [facility]:  Lab Frequency Next Occurrence   CBC Auto Differential Once 07/26/2022   ALT Once 07/27/2022   AST Once 51/28/0899   Basic Metabolic Panel Once 48/73/1969   Hemoglobin A1C Once 07/26/2022   Lipid Panel Once 07/26/2022   Microalbumin, Ur Once 07/26/2022   CT Lung Screen (Annual) Once 05/02/2023       Next Visit Date:  Future Appointments   Date Time Provider Vinayak Cornell   7/28/2022 12:00 PM Chad Segundo, APRN - CNP [facility]   11/16/2022  2:00 PM [facility]            Patient Active Problem List:     Liver disease     Chronic obstructive pulmonary disease, unspecified COPD type ([facility]     Diabetes mellitus ([facility]     Essential hypertension     Screen for colon cancer     Smoker     Morbid obesity with BMI of 40.0-44.9, adult ([facility]

## 2022-06-12 PROBLEM — Z12.11 SCREEN FOR COLON CANCER: Status: RESOLVED | Noted: 2022-05-13 | Resolved: 2022-06-12

## 2022-07-14 RX ORDER — CALCIUM CITRATE/VITAMIN D3 200MG-6.25
TABLET ORAL
Qty: 100 EACH | Refills: 3 | Status: SHIPPED | OUTPATIENT
Start: 2022-07-14

## 2022-07-14 NOTE — TELEPHONE ENCOUNTER
Health Maintenance   Topic Date Due    Pneumococcal 0-64 years Vaccine (1 - PCV) Never done    Diabetic foot exam  Never done    HIV screen  Never done    Diabetic retinal exam  Never done    Hepatitis B vaccine (1 of 3 - Risk 3-dose series) Never done    Prostate Specific Antigen (PSA) Screening or Monitoring  Never done    Shingles vaccine (1 of 2) Never done    Diabetic microalbuminuria test  05/11/2022    Flu vaccine (1) 09/01/2022    Low dose CT lung screening  09/15/2022    Lipids  10/05/2022    A1C test (Diabetic or Prediabetic)  01/27/2023    Depression Screen  01/27/2023    DTaP/Tdap/Td vaccine (2 - Td or Tdap) 06/09/2031    Colorectal Cancer Screen  05/13/2032    COVID-19 Vaccine  Completed    Hepatitis C screen  Completed    Hepatitis A vaccine  Aged Out    Hib vaccine  Aged Out    Meningococcal (ACWY) vaccine  Aged Out             (applicable per patient's age: Cancer Screenings, Depression Screening, Fall Risk Screening, Immunizations)    Hemoglobin A1C (%)   Date Value   01/27/2022 6.6   10/05/2021 6.7 (H)   06/09/2021 12.8     Microalb/Crt.  Ratio (mcg/mg creat)   Date Value   05/11/2021 CANNOT BE CALCULATED     LDL Cholesterol (mg/dL)   Date Value   10/05/2021 19     AST (U/L)   Date Value   05/10/2021 35     ALT (U/L)   Date Value   05/10/2021 24     BUN (mg/dL)   Date Value   10/05/2021 28 (H)      (goal A1C is < 7)   (goal LDL is <100) need 30-50% reduction from baseline     BP Readings from Last 3 Encounters:   05/13/22 (!) 116/51   05/13/22 133/73   05/12/22 128/73    (goal /80)      All Future Testing planned in [facility]:  Lab Frequency Next Occurrence   CBC Auto Differential Once 07/26/2022   ALT Once 07/27/2022   AST Once 27/26/9131   Basic Metabolic Panel Once 36/79/3095   Hemoglobin A1C Once 07/26/2022   Lipid Panel Once 07/26/2022   Microalbumin, Ur Once 07/26/2022   CT Lung Screen (Annual) Once 05/02/2023       Next Visit Date:  Future Appointments   Date Time Provider Vinayak Cornell   7/28/2022 12:00 PM Stefan Segundo, APRN - CNP [facility]   11/16/2022  2:00 PM Joanne Storey DO [facility]            Patient Active Problem List:     Liver disease     Chronic obstructive pulmonary disease, unspecified COPD type ([facility]     Diabetes mellitus ([facility]     Essential hypertension     Smoker     Morbid obesity with BMI of 40.0-44.9, adult ([facility]

## 2022-07-16 DIAGNOSIS — F34.1 DYSTHYMIA: ICD-10-CM

## 2022-07-18 ENCOUNTER — TELEPHONE (OUTPATIENT)
Dept: PULMONOLOGY | Age: 57
End: 2022-07-18

## 2022-07-18 DIAGNOSIS — J44.9 CHRONIC OBSTRUCTIVE PULMONARY DISEASE, UNSPECIFIED COPD TYPE (HCC): ICD-10-CM

## 2022-07-18 RX ORDER — BUPROPION HYDROCHLORIDE 150 MG/1
TABLET ORAL
Qty: 90 TABLET | Refills: 0 | OUTPATIENT
Start: 2022-07-18

## 2022-07-18 RX ORDER — ALBUTEROL SULFATE 2.5 MG/3ML
SOLUTION RESPIRATORY (INHALATION)
Qty: 300 ML | Refills: 1 | Status: SHIPPED | OUTPATIENT
Start: 2022-07-18

## 2022-07-18 NOTE — TELEPHONE ENCOUNTER
We received a prior auth request for pt's Ipratropium-Albuterol and it was denied. I contacted [facility] and they tried to run it through insurance again and still denied. I spoke with pt and he stated he is going to contact his insurance to see what they will cover and will let us know. He would like a refill on his Albuterol until then. States he needs to have something. Please advise.

## 2022-07-19 RX ORDER — ALBUTEROL SULFATE 90 UG/1
2 AEROSOL, METERED RESPIRATORY (INHALATION) EVERY 6 HOURS PRN
Qty: 18 G | Refills: 3 | Status: SHIPPED | OUTPATIENT
Start: 2022-07-19

## 2022-07-19 NOTE — TELEPHONE ENCOUNTER
Please see note below and patient is requesting an Albuterol Inhaler in place of the nebulizer solution since he cannot get any nebulizer solution yet. Please advise.

## 2022-07-23 DIAGNOSIS — E11.69 TYPE 2 DIABETES MELLITUS WITH OTHER SPECIFIED COMPLICATION, UNSPECIFIED WHETHER LONG TERM INSULIN USE (HCC): ICD-10-CM

## 2022-07-25 RX ORDER — PEN NEEDLE, DIABETIC 31 G X1/4"
NEEDLE, DISPOSABLE MISCELLANEOUS
Qty: 180 EACH | Refills: 3 | Status: SHIPPED | OUTPATIENT
Start: 2022-07-25

## 2022-07-25 NOTE — TELEPHONE ENCOUNTER
Health Maintenance   Topic Date Due    Pneumococcal 0-64 years Vaccine (1 - PCV) Never done    Diabetic foot exam  Never done    HIV screen  Never done    Diabetic retinal exam  Never done    Hepatitis B vaccine (1 of 3 - Risk 3-dose series) Never done    Prostate Specific Antigen (PSA) Screening or Monitoring  Never done    Shingles vaccine (1 of 2) Never done    Diabetic microalbuminuria test  05/11/2022    Flu vaccine (1) 09/01/2022    Low dose CT lung screening  09/15/2022    Lipids  10/05/2022    A1C test (Diabetic or Prediabetic)  01/27/2023    Depression Screen  01/27/2023    DTaP/Tdap/Td vaccine (2 - Td or Tdap) 06/09/2031    Colorectal Cancer Screen  05/13/2032    COVID-19 Vaccine  Completed    Hepatitis C screen  Completed    Hepatitis A vaccine  Aged Out    Hib vaccine  Aged Out    Meningococcal (ACWY) vaccine  Aged Out             (applicable per patient's age: Cancer Screenings, Depression Screening, Fall Risk Screening, Immunizations)    Hemoglobin A1C (%)   Date Value   01/27/2022 6.6   10/05/2021 6.7 (H)   06/09/2021 12.8     Microalb/Crt.  Ratio (mcg/mg creat)   Date Value   05/11/2021 CANNOT BE CALCULATED     LDL Cholesterol (mg/dL)   Date Value   10/05/2021 19     AST (U/L)   Date Value   05/10/2021 35     ALT (U/L)   Date Value   05/10/2021 24     BUN (mg/dL)   Date Value   10/05/2021 28 (H)      (goal A1C is < 7)   (goal LDL is <100) need 30-50% reduction from baseline     BP Readings from Last 3 Encounters:   05/13/22 (!) 116/51   05/13/22 133/73   05/12/22 128/73    (goal /80)      All Future Testing planned in [facility]:  Lab Frequency Next Occurrence   CBC Auto Differential Once 07/26/2022   ALT Once 07/27/2022   AST Once 34/81/6687   Basic Metabolic Panel Once 58/63/1442   Hemoglobin A1C Once 07/26/2022   Lipid Panel Once 07/26/2022   Microalbumin, Ur Once 07/26/2022   CT Lung Screen (Annual) Once 05/02/2023       Next Visit Date:  Future Appointments   Date Time Provider Department [place]   7/28/2022 12:00 PM Chad Segundo, APRN - CNP [facility]   11/16/2022  2:00 PM Shai Code, DO [facility]            Patient Active Problem List:     Liver disease     Chronic obstructive pulmonary disease, unspecified COPD type ([facility]     Diabetes mellitus ([facility]     Essential hypertension     Smoker     Morbid obesity with BMI of 40.0-44.9, adult ([facility]

## 2022-08-13 DIAGNOSIS — F34.1 DYSTHYMIA: ICD-10-CM

## 2022-08-13 DIAGNOSIS — E78.5 DYSLIPIDEMIA: ICD-10-CM

## 2022-08-15 RX ORDER — BUPROPION HYDROCHLORIDE 150 MG/1
TABLET ORAL
Qty: 90 TABLET | Refills: 1 | Status: SHIPPED | OUTPATIENT
Start: 2022-08-15

## 2022-08-15 RX ORDER — ATORVASTATIN CALCIUM 40 MG/1
TABLET, FILM COATED ORAL
Qty: 90 TABLET | Refills: 0 | OUTPATIENT
Start: 2022-08-15

## 2022-08-15 NOTE — TELEPHONE ENCOUNTER
11/16/2022  2:00 PM [facility] Oconnor[facility]            Patient Active Problem List:     Liver disease     Chronic obstructive pulmonary disease, unspecified COPD type ([facility]     Diabetes mellitus ([facility]     Essential hypertension     Smoker     Morbid obesity with BMI of 40.0-44.9, adult ([facility]

## 2022-08-24 ENCOUNTER — TELEPHONE (OUTPATIENT)
Dept: PRIMARY CARE CLINIC | Age: 57
End: 2022-08-24

## 2022-08-30 ENCOUNTER — TELEPHONE (OUTPATIENT)
Dept: PRIMARY CARE CLINIC | Age: 57
End: 2022-08-30

## 2022-10-21 DIAGNOSIS — E78.5 DYSLIPIDEMIA: ICD-10-CM

## 2022-10-21 DIAGNOSIS — E11.69 TYPE 2 DIABETES MELLITUS WITH OTHER SPECIFIED COMPLICATION, UNSPECIFIED WHETHER LONG TERM INSULIN USE (HCC): ICD-10-CM

## 2022-10-21 RX ORDER — ATORVASTATIN CALCIUM 40 MG/1
TABLET, FILM COATED ORAL
Qty: 90 TABLET | Refills: 1 | Status: SHIPPED | OUTPATIENT
Start: 2022-10-21

## 2022-10-21 RX ORDER — METFORMIN HYDROCHLORIDE 500 MG/1
TABLET, EXTENDED RELEASE ORAL
Qty: 180 TABLET | Refills: 1 | Status: SHIPPED | OUTPATIENT
Start: 2022-10-21 | End: 2022-11-14

## 2022-10-21 NOTE — TELEPHONE ENCOUNTER
Health Maintenance   Topic Date Due    Pneumococcal 0-64 years Vaccine (1 - PCV) Never done    Diabetic foot exam  Never done    HIV screen  Never done    Diabetic retinal exam  Never done    Hepatitis B vaccine (1 of 3 - Risk 3-dose series) Never done    Shingles vaccine (1 of 2) Never done    COVID-19 Vaccine (4 - Booster for Nigel series) 02/09/2022    Diabetic microalbuminuria test  05/11/2022    Flu vaccine (1) 08/01/2022    Low dose CT lung screening  09/15/2022    Lipids  10/05/2022    A1C test (Diabetic or Prediabetic)  01/27/2023    Depression Screen  01/27/2023    DTaP/Tdap/Td vaccine (2 - Td or Tdap) 06/09/2031    Colorectal Cancer Screen  05/13/2032    Hepatitis C screen  Completed    Hepatitis A vaccine  Aged Out    Hib vaccine  Aged Out    Meningococcal (ACWY) vaccine  Aged Out             (applicable per patient's age: Cancer Screenings, Depression Screening, Fall Risk Screening, Immunizations)    Hemoglobin A1C (%)   Date Value   01/27/2022 6.6   10/05/2021 6.7 (H)   06/09/2021 12.8     Microalb/Crt.  Ratio (mcg/mg creat)   Date Value   05/11/2021 CANNOT BE CALCULATED     LDL Cholesterol (mg/dL)   Date Value   10/05/2021 19     AST (U/L)   Date Value   05/10/2021 35     ALT (U/L)   Date Value   05/10/2021 24     BUN (mg/dL)   Date Value   10/05/2021 28 (H)      (goal A1C is < 7)   (goal LDL is <100) need 30-50% reduction from baseline     BP Readings from Last 3 Encounters:   05/13/22 (!) 116/51   05/13/22 133/73   05/12/22 128/73    (goal /80)      All Future Testing planned in [facility]:  Lab Frequency Next Occurrence   CBC Auto Differential Once 07/26/2022   ALT Once 07/27/2022   AST Once 43/65/1547   Basic Metabolic Panel Once 11/83/8888   Hemoglobin A1C Once 07/26/2022   Lipid Panel Once 07/26/2022   Microalbumin, Ur Once 07/26/2022   CT Lung Screen (Annual) Once 05/02/2023       Next Visit Date:  Future Appointments   Date Time Provider Vinayak Cornell   11/16/2022  2:00 PM Dane Hayes DO [place] [facility]            Patient Active Problem List:     Liver disease     Chronic obstructive pulmonary disease, unspecified COPD type ([facility]     Diabetes mellitus ([facility]     Essential hypertension     Smoker     Morbid obesity with BMI of 40.0-44.9, adult ([facility]

## 2022-10-26 ENCOUNTER — TELEPHONE (OUTPATIENT)
Dept: PULMONOLOGY | Age: 57
End: 2022-10-26

## 2022-10-26 RX ORDER — BUDESONIDE AND FORMOTEROL FUMARATE DIHYDRATE 160; 4.5 UG/1; UG/1
2 AEROSOL RESPIRATORY (INHALATION) 2 TIMES DAILY
Qty: 10.2 G | Refills: 5 | Status: SHIPPED | OUTPATIENT
Start: 2022-10-26 | End: 2022-11-16

## 2022-10-26 NOTE — TELEPHONE ENCOUNTER
Name brand Symbicort denied but the pharmacy suggested doing the Generic instead. Otherwise they indicated Arnuity Ellipta, Fluticasone or Spiriva.

## 2022-11-01 NOTE — PROGRESS NOTES
Subjective:      Patient ID: Alannah Ojeda is a 61 y.o. male. HPI  Patient here for COPD, smoking, chronic hypoxemic respiratory failure. Last seen in office per me in May 2022 and in October 2021 per Dr. Soniod Dudley    Patient unfortunately continues to smoke, still smoking three quarters of a pack per day. Not motivated to quit. Patient indicating that he has changed insurance providers and needs to be requalified for his oxygen. 6-minute walk test completed 11/16/2022 noted his SPO2 was 86% on room air at rest.  Patient had 2 L of supplemental oxygen applied and he was ambulated for total of 6 minutes. SPO2 dropped down to 88% on 2 L and required 3 L to maintain an SPO2 of greater than 92% with ambulation. Patient did qualify for supplemental oxygen. Patient also indicating that his Symbicort is not affordable, asking if there is a cheaper alternative. In his EMR under formulary Breo does appear to be covered. Prescription for Adwoa Farooqard was sent to the pharmacy. Patient remains eligible for CT lung screening, he will be due in May 2023. Patient vocalized some hesitancy due to cost.  This is a screening test, should be covered by his insurance and I did encourage patient to obtain. Medications:   Albuterol  Symbicort changed to Breo due to cost.         PRIOR WORKUP:  PFT:  6-minute walk test 11/16/2022: SPO2 was 86% on room air at rest.  Patient had 2 L of supplemental oxygen applied and he was ambulated for total of 6 minutes. SPO2 dropped down to 88% on 2 L and required 3 L to maintain an SPO2 of greater than 92% with ambulation. Patient did qualify for supplemental oxygen. PFT 6/22/2020: Patient spirometry shows an obstructive flow volume loop with an FEV1 of 41% of predicted with an 11% postbronchodilator change to 46% of predicted. FVC is 2.74, 54% of predicted with a 10% bronchodilator change to 3.10 L. FEV1/FVC ratio is 58. % of predicted.   Total lung capacity 115% of predicted. Diffusion capacity when corrected for alveolar volume is normal.  Final impression: Stage III COPD with a bronchodilator response. Air trapping with mild to moderately decreased diffusion capacity. CT Imaging:  CT lung screening 5/2/2022: No mediastinal hilar or axillary adenopathy. Anterior segment left upper lobe micronodule is unchanged. Solid noncalcified 9.3 mm right lower lobe nodule measured 8.5 mm in February 2020. Not FDG avid on PET scan in March 2020. No significant new nodules or masses. CT lung screening 9/15/2021: No mediastinal adenopathy. Slightly enlarging 9 mm right lower lobe pulmonary nodule previously measuring 7 mm in June 2020. Stable 3 mm subpleural right middle lobe nodule. Mild emphysema. Multifocal parenchymal banding, bibasilar atelectasis and respiratory motion. No pneumothorax or airspace consolidation. Stable 2 mm pulmonary nodule in the subpleural left upper lobe unchanged from June 2020. CT chest 6/22/2020: No mediastinal adenopathy. Mild scarring with emphysematous changes. No focal consolidation, pneumothorax or pleural effusion. Stable 9 mm solid noncalcified pulmonary nodule right lower lobe. Stable 3 mm solid noncalcified subpleural pulmonary nodule right middle lobe. No new or enlarging pulmonary nodule. Calcified granuloma right lower lobe. PET scan 3/18/2020: 3 mm nodule within the right middle lobe is too small for PET scan characterization. 9 mm nodule within the right lower lobe is not FDG avid with an SUV max of 0.8. No abnormal FDG avidity within the chest.     CT lung screening 2/26/2020: No mediastinal adenopathy. Lungs are without acute process. No focal consolidation or pulmonary edema. No pleural effusion or pneumothorax. Punctate calcified granuloma peripheral right lower lobe. 9 mm right lower lobe nodule and a 3 mm right middle lobe pleural-based nodule.         Sleep Study:     Laboratory Evaluation:    Immunizations:   Immunization History   Administered Date(s) Administered    COVID-19, J&J, (age 18y+), IM, 0.5 mL 04/29/2021, 12/15/2021    Influenza, AFLURIA (age 1 yrs+), FLUZONE, (age 10 mo+), MDV, 0.5mL 12/07/2020    Influenza, FLUCELVAX, (age 10 mo+), MDCK, PF, 0.5mL 10/22/2021    Tdap (Boostrix, Adacel) 06/09/2021        No flowsheet data found.     BP (!) 147/85   Pulse 74   Temp 97.3 °F (36.3 °C)   Resp 16   Ht 5' 11\" (1.803 m)   Wt 295 lb 9.6 oz (134.1 kg)   SpO2 91%   BMI 41.23 kg/m²     Past Medical History:   Diagnosis Date    COPD (chronic obstructive pulmonary disease) (HCC)     Hypertension     Liver disease      Past Surgical History:   Procedure Laterality Date    ANKLE SURGERY      CARPAL TUNNEL RELEASE Bilateral     COLONOSCOPY  05/13/2022    COLORECTAL CANCER SCREENING, NOT HIGH RISK     COLONOSCOPY N/A 5/13/2022    COLORECTAL CANCER SCREENING, NOT HIGH RISK performed by Nicole Martinez DO at [address] [place] [address] ARTHROSCOPY      x's 5    NECK SURGERY       Family History   Problem Relation Age of Onset    Allergies Mother     Diabetes Father     Esophageal Cancer Father        Social History     Socioeconomic History    Marital status: Single     Spouse name: Not on file    Number of children: Not on file    Years of education: 12    Highest education level: Not on file   Occupational History    Not on file   Tobacco Use    Smoking status: Some Days     Packs/day: 0.50     Years: 42.00     Pack years: 21.00     Types: Cigarettes    Smokeless tobacco: Never   Vaping Use    Vaping Use: Never used   Substance and Sexual Activity    Alcohol use: Not Currently     Comment: rarely-hx of heavy alcohol use    Drug use: Yes     Types: Marijuana [place])    Sexual activity: Not on file   Other Topics Concern    Not on file   Social History Narrative    Not on file     Social Determinants of Health     Financial Resource Strain: Not on file   Food Insecurity: Not on file Transportation Needs: Not on file   Physical Activity: Not on file   Stress: Not on file   Social Connections: Not on file   Intimate Partner Violence: Not on file   Housing Stability: Not on file       Review of Systems   Constitutional:         Decreased activity tolerance secondary to exertional dyspnea. HENT:          Denies sinus pain/pressure. Denies rhinorrhea. Occasional cough   Eyes: Negative. Respiratory:          Exertional dyspnea. No significant cough. Denies sputum production-specifically purulent or hemoptysis   Cardiovascular: Negative. Gastrointestinal: Negative. Endocrine: Negative. Genitourinary: Negative. Musculoskeletal: Negative. Skin: Negative. Allergic/Immunologic: Negative. Neurological: Negative. Hematological: Negative. Psychiatric/Behavioral: Negative. Objective:       Physical Exam  General appearance - alert, well appearing, and in no distress, oriented to person, place, and time and overweight  Mental status - alert, oriented to person, place, and time, normal mood, behavior, speech, dress, motor activity, and thought processes  Eyes - pupils equal and reactive, extraocular eye movements intact  Ears - not examined  Nose - normal and patent, no erythema, discharge or polyps  Mouth - mucous membranes moist, pharynx normal without lesions  Neck - supple, no significant adenopathy  Chest -increased AP diameter, decreased thoracic expansion and excursion, prolonged Tory phase. No adventitious lung sounds appreciated. No wheezing rhonchi or rales.   Heart -normal rate, regular rhythm, normal S1, S2, no murmurs, rubs, clicks or gallops  Abdomen - soft, nontender, nondistended, no masses or organomegaly  Neuro- alert, oriented, normal speech, no focal findings or movement disorder noted}  Extremities - peripheral pulses normal, no pedal edema, no clubbing or cyanosis  Skin - normal coloration and turgor, no rashes, no suspicious skin lesions noted Wt Readings from Last 3 Encounters:   11/16/22 295 lb 9.6 oz (134.1 kg)   05/13/22 (!) 303 lb (137.4 kg)   05/12/22 (!) 303 lb 12.8 oz (137.8 kg)       Results for orders placed or performed during the hospital encounter of 05/13/22   Glucose, Whole Blood   Result Value Ref Range    POC Glucose 134 (H) 74 - 100 mg/dL       No results found. Assessment:      1. Hypoxemic respiratory failure, chronic ([facility])    2. Essential hypertension    3. Simple chronic bronchitis (HCC)    4. Lung nodule    5. Personal history of tobacco use    6. Morbid obesity due to excess calories ([place] [facility])          Plan:      Medications reviewed, continue as ordered. I did change his Symbicort to [facility] as it does appear to be on his formulary. Educated and clarified the medication use. Refills sent to Rx if requested. Recommend flu vaccination in the fall annually. Due now  Patient is up-to-date with pneumococcal vaccinations from pulmonary perspective. Patient has received initial Covid vaccination. Recommend bivalent booster when eligible. Discussed strategies to protect against Covid 19. Maintain an active lifestyle. Patient's questions were answered to his satisfaction. 6-minute walk test completed today to requalify for patient for supplemental oxygen with insurance change. Patient did qualify for supplemental oxygen at 3 L/min.   Strongly admonished smoking cessation patient not motivated to quit  Pulmonary function tests were reviewed per physician  Chest x-ray was reviewed  CT scan of the chest was reviewed due in May 2023  We'll see the patient back in 6 months after CT lung screening          Electronically signed by ADRIAN Taylor CNP on 11/16/2022 at 3:58 PM

## 2022-11-12 DIAGNOSIS — E11.69 TYPE 2 DIABETES MELLITUS WITH OTHER SPECIFIED COMPLICATION, WITH LONG-TERM CURRENT USE OF INSULIN (HCC): ICD-10-CM

## 2022-11-12 DIAGNOSIS — Z79.4 TYPE 2 DIABETES MELLITUS WITH OTHER SPECIFIED COMPLICATION, WITH LONG-TERM CURRENT USE OF INSULIN (HCC): ICD-10-CM

## 2022-11-12 DIAGNOSIS — E11.69 TYPE 2 DIABETES MELLITUS WITH OTHER SPECIFIED COMPLICATION, UNSPECIFIED WHETHER LONG TERM INSULIN USE (HCC): ICD-10-CM

## 2022-11-14 RX ORDER — METFORMIN HYDROCHLORIDE 500 MG/1
TABLET, EXTENDED RELEASE ORAL
Qty: 180 TABLET | Refills: 1 | Status: SHIPPED | OUTPATIENT
Start: 2022-11-14

## 2022-11-14 RX ORDER — INSULIN GLARGINE 100 [IU]/ML
INJECTION, SOLUTION SUBCUTANEOUS
Qty: 15 ML | Refills: 5 | Status: SHIPPED | OUTPATIENT
Start: 2022-11-14

## 2022-11-14 NOTE — TELEPHONE ENCOUNTER
Health Maintenance   Topic Date Due    Pneumococcal 0-64 years Vaccine (1 - PCV) Never done    Diabetic foot exam  Never done    HIV screen  Never done    Diabetic retinal exam  Never done    Hepatitis B vaccine (1 of 3 - Risk 3-dose series) Never done    Shingles vaccine (1 of 2) Never done    COVID-19 Vaccine (4 - Booster for Nigel series) 02/09/2022    Diabetic microalbuminuria test  05/11/2022    Flu vaccine (1) 08/01/2022    Low dose CT lung screening  09/15/2022    Lipids  10/05/2022    A1C test (Diabetic or Prediabetic)  01/27/2023    Depression Screen  01/27/2023    DTaP/Tdap/Td vaccine (2 - Td or Tdap) 06/09/2031    Colorectal Cancer Screen  05/13/2032    Hepatitis C screen  Completed    Hepatitis A vaccine  Aged Out    Hib vaccine  Aged Out    Meningococcal (ACWY) vaccine  Aged Out             (applicable per patient's age: Cancer Screenings, Depression Screening, Fall Risk Screening, Immunizations)    Hemoglobin A1C (%)   Date Value   01/27/2022 6.6   10/05/2021 6.7 (H)   06/09/2021 12.8     Microalb/Crt.  Ratio (mcg/mg creat)   Date Value   05/11/2021 CANNOT BE CALCULATED     LDL Cholesterol (mg/dL)   Date Value   10/05/2021 19     AST (U/L)   Date Value   05/10/2021 35     ALT (U/L)   Date Value   05/10/2021 24     BUN (mg/dL)   Date Value   10/05/2021 28 (H)      (goal A1C is < 7)   (goal LDL is <100) need 30-50% reduction from baseline     BP Readings from Last 3 Encounters:   05/13/22 (!) 116/51   05/13/22 133/73   05/12/22 128/73    (goal /80)      All Future Testing planned in [facility]:  Lab Frequency Next Occurrence   CBC Auto Differential Once 07/26/2022   ALT Once 07/27/2022   AST Once 42/37/7219   Basic Metabolic Panel Once 89/00/9819   Hemoglobin A1C Once 07/26/2022   Lipid Panel Once 07/26/2022   Microalbumin, Ur Once 07/26/2022   CT Lung Screen (Annual) Once 05/02/2023       Next Visit Date:  Future Appointments   Date Time Provider Vinayak Cornell   11/16/2022  2:00 PM Valeria LUDWIG Marvin Ramos, APRN - CNP [facility]            Patient Active Problem List:     Liver disease     Chronic obstructive pulmonary disease, unspecified COPD type ([facility]     Diabetes mellitus ([facility]     Essential hypertension     Smoker     Morbid obesity with BMI of 40.0-44.9, adult ([facility]

## 2022-11-16 ENCOUNTER — OFFICE VISIT (OUTPATIENT)
Dept: PULMONOLOGY | Age: 57
End: 2022-11-16
Payer: MEDICARE

## 2022-11-16 ENCOUNTER — HOSPITAL ENCOUNTER (OUTPATIENT)
Dept: PULMONOLOGY | Age: 57
Discharge: HOME | End: 2022-11-16
Payer: MEDICARE

## 2022-11-16 VITALS
RESPIRATION RATE: 16 BRPM | SYSTOLIC BLOOD PRESSURE: 147 MMHG | OXYGEN SATURATION: 91 % | WEIGHT: 295.6 LBS | DIASTOLIC BLOOD PRESSURE: 85 MMHG | HEART RATE: 74 BPM | TEMPERATURE: 97.3 F | HEIGHT: 71 IN | BODY MASS INDEX: 41.38 KG/M2

## 2022-11-16 DIAGNOSIS — E66.01 MORBID OBESITY DUE TO EXCESS CALORIES (HCC): ICD-10-CM

## 2022-11-16 DIAGNOSIS — I10 ESSENTIAL HYPERTENSION: ICD-10-CM

## 2022-11-16 DIAGNOSIS — J96.11 HYPOXEMIC RESPIRATORY FAILURE, CHRONIC (HCC): Primary | ICD-10-CM

## 2022-11-16 DIAGNOSIS — R91.1 LUNG NODULE: ICD-10-CM

## 2022-11-16 DIAGNOSIS — Z87.891 PERSONAL HISTORY OF TOBACCO USE: ICD-10-CM

## 2022-11-16 DIAGNOSIS — J96.11 HYPOXEMIC RESPIRATORY FAILURE, CHRONIC (HCC): ICD-10-CM

## 2022-11-16 DIAGNOSIS — J41.0 SIMPLE CHRONIC BRONCHITIS (HCC): ICD-10-CM

## 2022-11-16 LAB
DISTANCE WALKED: 300 FT
SPO2: 86 %

## 2022-11-16 PROCEDURE — G8427 DOCREV CUR MEDS BY ELIG CLIN: HCPCS | Performed by: NURSE PRACTITIONER

## 2022-11-16 PROCEDURE — 4004F PT TOBACCO SCREEN RCVD TLK: CPT | Performed by: NURSE PRACTITIONER

## 2022-11-16 PROCEDURE — 99214 OFFICE O/P EST MOD 30 MIN: CPT | Performed by: NURSE PRACTITIONER

## 2022-11-16 PROCEDURE — G8417 CALC BMI ABV UP PARAM F/U: HCPCS | Performed by: NURSE PRACTITIONER

## 2022-11-16 PROCEDURE — 94618 PULMONARY STRESS TESTING: CPT

## 2022-11-16 PROCEDURE — 3078F DIAST BP <80 MM HG: CPT | Performed by: NURSE PRACTITIONER

## 2022-11-16 PROCEDURE — 3074F SYST BP LT 130 MM HG: CPT | Performed by: NURSE PRACTITIONER

## 2022-11-16 PROCEDURE — G8484 FLU IMMUNIZE NO ADMIN: HCPCS | Performed by: NURSE PRACTITIONER

## 2022-11-16 PROCEDURE — 3023F SPIROM DOC REV: CPT | Performed by: NURSE PRACTITIONER

## 2022-11-16 PROCEDURE — 3017F COLORECTAL CA SCREEN DOC REV: CPT | Performed by: NURSE PRACTITIONER

## 2022-11-16 RX ORDER — IPRATROPIUM BROMIDE AND ALBUTEROL SULFATE 2.5; .5 MG/3ML; MG/3ML
1 SOLUTION RESPIRATORY (INHALATION) EVERY 6 HOURS PRN
Qty: 360 ML | Refills: 11 | Status: SHIPPED | OUTPATIENT
Start: 2022-11-16

## 2022-11-16 ASSESSMENT — 6 MINUTE WALK TEST (6MWT)
O2 SATURATION 2: 92
OXYGEN DEVICE: ROOM AIR
ADDTIONAL O2 FLOW RATE (L/MIN): YES
HEART RATE: 83
HEART RATE: 87
O2 SATURATION: 92
O2 SATURATION: 86
O2 FLOW RATE (L/MIN): 3
O2 FLOW RATE 2 (L/MIN): 3
O2 FLOW RATE (L/MIN): 2
O2 SATURATION: 88

## 2022-11-16 ASSESSMENT — ENCOUNTER SYMPTOMS
EYES NEGATIVE: 1
ALLERGIC/IMMUNOLOGIC NEGATIVE: 1
GASTROINTESTINAL NEGATIVE: 1

## 2022-11-16 NOTE — PATIENT INSTRUCTIONS
SURVEY:    You may be receiving a survey from [employer] regarding your visit today. Please complete the survey to enable us to provide the highest quality of care to you and your family. If you cannot score us a very good on any question, please call the office to discuss how we could have made your experience a very good one. Thank you.

## 2022-11-16 NOTE — PROGRESS NOTES
11/16/22 1459   Data Measured Before Walk   HR 83   O2 Saturation 86   O2 Device Room air   Data Measured During the Walk   Heart Rate 87   O2 Flow Rate (l/min) 2 l/min   O2 Saturation 88   Need Additional O2 Flow Rate Rows Yes   O2 Flow Rate (l/min) 3 l/min   O2 Saturation 92   Symptoms Dizziness; Fatigue;SOB; Tightness   Data Measured Immediately After Walk   Total Distance Walked (m)   (300 ft)   O2 Flow Rate (l/min) 3 l/min   O2 Saturation 92   Data Measured 5 Minutes After Walk   Comments SPO2 86% on RA at rest. Applied 2L at rest, SPO2 94%. Patient walked 6 minutes, SPO2 88% on 2L, bumped up to 3L, SPO2 92%.      Electronically signed by Karley Pete RCP on 11/16/2022 at 3:09 PM

## 2023-01-01 NOTE — TELEPHONE ENCOUNTER
Our records indicate that your patient is due for their annual lung cancer screening follow up testing. For your convenience, we have pended the order for the scan for you. If you do not agree with the need for the test, please cancel the order and let us know. Sincerely,    [address] [place] [address] Screening Program    Auto printed reminder letter sent to patient. Labs

## 2023-02-16 ENCOUNTER — TELEPHONE (OUTPATIENT)
Dept: PRIMARY CARE CLINIC | Age: 58
End: 2023-02-16

## 2023-02-16 RX ORDER — INSULIN DEGLUDEC 200 U/ML
24 INJECTION, SOLUTION SUBCUTANEOUS DAILY
Qty: 3 ADJUSTABLE DOSE PRE-FILLED PEN SYRINGE | Refills: 5 | Status: SHIPPED | OUTPATIENT
Start: 2023-02-16

## 2023-02-16 NOTE — TELEPHONE ENCOUNTER
[facility] called and stated that patients insurance will not cover Lantus this year. They will cover Levemir or [place].

## 2023-03-09 DIAGNOSIS — F34.1 DYSTHYMIA: ICD-10-CM

## 2023-03-09 RX ORDER — BUPROPION HYDROCHLORIDE 150 MG/1
TABLET ORAL
Qty: 30 TABLET | Refills: 0 | Status: SHIPPED | OUTPATIENT
Start: 2023-03-09

## 2023-04-10 DIAGNOSIS — F34.1 DYSTHYMIA: ICD-10-CM

## 2023-04-10 RX ORDER — BUPROPION HYDROCHLORIDE 150 MG/1
TABLET ORAL
Qty: 30 TABLET | Refills: 0 | OUTPATIENT
Start: 2023-04-10

## 2023-04-23 DIAGNOSIS — F34.1 DYSTHYMIA: ICD-10-CM

## 2023-04-24 RX ORDER — BUPROPION HYDROCHLORIDE 150 MG/1
TABLET ORAL
Qty: 30 TABLET | Refills: 0 | OUTPATIENT
Start: 2023-04-24

## 2023-05-15 DIAGNOSIS — Z79.4 TYPE 2 DIABETES MELLITUS WITH OTHER SPECIFIED COMPLICATION, WITH LONG-TERM CURRENT USE OF INSULIN (HCC): ICD-10-CM

## 2023-05-15 DIAGNOSIS — I10 ESSENTIAL HYPERTENSION: ICD-10-CM

## 2023-05-15 DIAGNOSIS — F34.1 DYSTHYMIA: ICD-10-CM

## 2023-05-15 DIAGNOSIS — E11.69 TYPE 2 DIABETES MELLITUS WITH OTHER SPECIFIED COMPLICATION, WITH LONG-TERM CURRENT USE OF INSULIN (HCC): ICD-10-CM

## 2023-05-15 RX ORDER — CARVEDILOL 3.12 MG/1
3.12 TABLET ORAL 2 TIMES DAILY
Qty: 60 TABLET | Refills: 0 | Status: SHIPPED | OUTPATIENT
Start: 2023-05-15

## 2023-05-15 RX ORDER — BUPROPION HYDROCHLORIDE 150 MG/1
150 TABLET ORAL EVERY MORNING
Qty: 30 TABLET | Refills: 0 | Status: SHIPPED | OUTPATIENT
Start: 2023-05-15

## 2023-05-15 NOTE — TELEPHONE ENCOUNTER
Dexter Ivey can not come for an appt until after Ruth 3 because of when he gets paid.   He has an appointment  scheduled June 8

## 2023-05-15 NOTE — TELEPHONE ENCOUNTER
Health Maintenance   Topic Date Due    Pneumococcal 0-64 years Vaccine (1 - PCV) Never done    Diabetic foot exam  Never done    HIV screen  Never done    Diabetic retinal exam  Never done    Hepatitis B vaccine (1 of 3 - Risk 3-dose series) Never done    Shingles vaccine (1 of 2) Never done    COVID-19 Vaccine (4 - Booster for Nigel series) 02/09/2022    Diabetic Alb to Cr ratio (uACR) test  05/11/2022    Low dose CT lung screening  09/15/2022    Lipids  10/05/2022    GFR test (Diabetes, CKD 3-4, OR last GFR 15-59)  10/05/2022    Annual Wellness Visit (AWV)  Never done    A1C test (Diabetic or Prediabetic)  01/27/2023    Depression Screen  01/27/2023    Flu vaccine (Season Ended) 08/01/2023    DTaP/Tdap/Td vaccine (2 - Td or Tdap) 06/09/2031    Colorectal Cancer Screen  05/13/2032    Hepatitis C screen  Completed    Hepatitis A vaccine  Aged Out    Hib vaccine  Aged Out    Meningococcal (ACWY) vaccine  Aged Out    Depression Monitoring  Discontinued             (applicable per patient's age: Cancer Screenings, Depression Screening, Fall Risk Screening, Immunizations)    Hemoglobin A1C (%)   Date Value   01/27/2022 6.6   10/05/2021 6.7 (H)   06/09/2021 12.8     Microalb/Crt.  Ratio (mcg/mg creat)   Date Value   05/11/2021 CANNOT BE CALCULATED     LDL Cholesterol (mg/dL)   Date Value   10/05/2021 19     AST (U/L)   Date Value   05/10/2021 35     ALT (U/L)   Date Value   05/10/2021 24     BUN (mg/dL)   Date Value   10/05/2021 28 (H)      (goal A1C is < 7)   (goal LDL is <100) need 30-50% reduction from baseline     BP Readings from Last 3 Encounters:   11/16/22 (!) 147/85   05/13/22 (!) 116/51   05/13/22 133/73    (goal /80)      All Future Testing planned in [facility]:  Lab Frequency Next Occurrence   CT Lung Screen (Annual) Once 05/02/2023       Next Visit Date:  Future Appointments   Date Time Provider Vinayak Cornell   5/22/2023  1:45 PM Ita Jernigan MD [facility]   6/8/2023  9:40 AM Sandy Segundo,

## 2023-05-16 DIAGNOSIS — E78.5 DYSLIPIDEMIA: ICD-10-CM

## 2023-05-16 RX ORDER — ATORVASTATIN CALCIUM 40 MG/1
TABLET, FILM COATED ORAL
Qty: 90 TABLET | Refills: 1 | Status: SHIPPED | OUTPATIENT
Start: 2023-05-16

## 2023-05-16 NOTE — TELEPHONE ENCOUNTER
Health Maintenance   Topic Date Due    Pneumococcal 0-64 years Vaccine (1 - PCV) Never done    Diabetic foot exam  Never done    HIV screen  Never done    Diabetic retinal exam  Never done    Hepatitis B vaccine (1 of 3 - Risk 3-dose series) Never done    Shingles vaccine (1 of 2) Never done    COVID-19 Vaccine (4 - Booster for Nigel series) 02/09/2022    Diabetic Alb to Cr ratio (uACR) test  05/11/2022    Low dose CT lung screening  09/15/2022    Lipids  10/05/2022    GFR test (Diabetes, CKD 3-4, OR last GFR 15-59)  10/05/2022    Annual Wellness Visit (AWV)  Never done    A1C test (Diabetic or Prediabetic)  01/27/2023    Depression Screen  01/27/2023    Flu vaccine (Season Ended) 08/01/2023    DTaP/Tdap/Td vaccine (2 - Td or Tdap) 06/09/2031    Colorectal Cancer Screen  05/13/2032    Hepatitis C screen  Completed    Hepatitis A vaccine  Aged Out    Hib vaccine  Aged Out    Meningococcal (ACWY) vaccine  Aged Out    Depression Monitoring  Discontinued             (applicable per patient's age: Cancer Screenings, Depression Screening, Fall Risk Screening, Immunizations)    Hemoglobin A1C (%)   Date Value   01/27/2022 6.6   10/05/2021 6.7 (H)   06/09/2021 12.8     Microalb/Crt.  Ratio (mcg/mg creat)   Date Value   05/11/2021 CANNOT BE CALCULATED     LDL Cholesterol (mg/dL)   Date Value   10/05/2021 19     AST (U/L)   Date Value   05/10/2021 35     ALT (U/L)   Date Value   05/10/2021 24     BUN (mg/dL)   Date Value   10/05/2021 28 (H)      (goal A1C is < 7)   (goal LDL is <100) need 30-50% reduction from baseline     BP Readings from Last 3 Encounters:   11/16/22 (!) 147/85   05/13/22 (!) 116/51   05/13/22 133/73    (goal /80)      All Future Testing planned in [facility]:  Lab Frequency Next Occurrence   CT Lung Screen (Annual) Once 05/02/2023       Next Visit Date:  Future Appointments   Date Time Provider Vinayak Cornell   5/22/2023  1:45 PM Vielka Walden MD [facility]   6/8/2023  9:40 AM Alirio Segundo,

## 2023-06-08 ENCOUNTER — OFFICE VISIT (OUTPATIENT)
Dept: PRIMARY CARE CLINIC | Age: 58
End: 2023-06-08
Payer: MEDICARE

## 2023-06-08 VITALS
TEMPERATURE: 97.8 F | WEIGHT: 290.7 LBS | HEART RATE: 84 BPM | SYSTOLIC BLOOD PRESSURE: 168 MMHG | RESPIRATION RATE: 22 BRPM | DIASTOLIC BLOOD PRESSURE: 94 MMHG | OXYGEN SATURATION: 92 % | BODY MASS INDEX: 40.54 KG/M2

## 2023-06-08 DIAGNOSIS — Z12.5 SCREENING PSA (PROSTATE SPECIFIC ANTIGEN): ICD-10-CM

## 2023-06-08 DIAGNOSIS — Z79.4 TYPE 2 DIABETES MELLITUS WITH OTHER SPECIFIED COMPLICATION, WITH LONG-TERM CURRENT USE OF INSULIN (HCC): Primary | ICD-10-CM

## 2023-06-08 DIAGNOSIS — E11.69 TYPE 2 DIABETES MELLITUS WITH OTHER SPECIFIED COMPLICATION, WITH LONG-TERM CURRENT USE OF INSULIN (HCC): Primary | ICD-10-CM

## 2023-06-08 DIAGNOSIS — I10 ESSENTIAL HYPERTENSION: ICD-10-CM

## 2023-06-08 LAB — HBA1C MFR BLD: 6.7 %

## 2023-06-08 PROCEDURE — 3017F COLORECTAL CA SCREEN DOC REV: CPT | Performed by: NURSE PRACTITIONER

## 2023-06-08 PROCEDURE — 83036 HEMOGLOBIN GLYCOSYLATED A1C: CPT | Performed by: NURSE PRACTITIONER

## 2023-06-08 PROCEDURE — 2022F DILAT RTA XM EVC RTNOPTHY: CPT | Performed by: NURSE PRACTITIONER

## 2023-06-08 PROCEDURE — 4004F PT TOBACCO SCREEN RCVD TLK: CPT | Performed by: NURSE PRACTITIONER

## 2023-06-08 PROCEDURE — 99214 OFFICE O/P EST MOD 30 MIN: CPT | Performed by: NURSE PRACTITIONER

## 2023-06-08 PROCEDURE — G8417 CALC BMI ABV UP PARAM F/U: HCPCS | Performed by: NURSE PRACTITIONER

## 2023-06-08 PROCEDURE — 3044F HG A1C LEVEL LT 7.0%: CPT | Performed by: NURSE PRACTITIONER

## 2023-06-08 PROCEDURE — 3077F SYST BP >= 140 MM HG: CPT | Performed by: NURSE PRACTITIONER

## 2023-06-08 PROCEDURE — 3080F DIAST BP >= 90 MM HG: CPT | Performed by: NURSE PRACTITIONER

## 2023-06-08 PROCEDURE — G8427 DOCREV CUR MEDS BY ELIG CLIN: HCPCS | Performed by: NURSE PRACTITIONER

## 2023-06-08 RX ORDER — METFORMIN HYDROCHLORIDE 500 MG/1
TABLET, EXTENDED RELEASE ORAL
Qty: 180 TABLET | Refills: 1 | Status: SHIPPED | OUTPATIENT
Start: 2023-06-08

## 2023-06-08 RX ORDER — CARVEDILOL 3.12 MG/1
3.12 TABLET ORAL 2 TIMES DAILY
Qty: 180 TABLET | Refills: 1 | Status: SHIPPED | OUTPATIENT
Start: 2023-06-08

## 2023-06-08 RX ORDER — BUPROPION HYDROCHLORIDE 150 MG/1
150 TABLET ORAL EVERY MORNING
Qty: 90 TABLET | Refills: 1 | Status: SHIPPED | OUTPATIENT
Start: 2023-06-08

## 2023-06-08 RX ORDER — LOSARTAN POTASSIUM 50 MG/1
50 TABLET ORAL DAILY
Qty: 90 TABLET | Refills: 3 | Status: SHIPPED | OUTPATIENT
Start: 2023-06-08

## 2023-06-08 SDOH — ECONOMIC STABILITY: INCOME INSECURITY: HOW HARD IS IT FOR YOU TO PAY FOR THE VERY BASICS LIKE FOOD, HOUSING, MEDICAL CARE, AND HEATING?: PATIENT DECLINED

## 2023-06-08 SDOH — ECONOMIC STABILITY: FOOD INSECURITY: WITHIN THE PAST 12 MONTHS, THE FOOD YOU BOUGHT JUST DIDN'T LAST AND YOU DIDN'T HAVE MONEY TO GET MORE.: PATIENT DECLINED

## 2023-06-08 SDOH — ECONOMIC STABILITY: FOOD INSECURITY: WITHIN THE PAST 12 MONTHS, YOU WORRIED THAT YOUR FOOD WOULD RUN OUT BEFORE YOU GOT MONEY TO BUY MORE.: PATIENT DECLINED

## 2023-06-08 SDOH — ECONOMIC STABILITY: HOUSING INSECURITY
IN THE LAST 12 MONTHS, WAS THERE A TIME WHEN YOU DID NOT HAVE A STEADY PLACE TO SLEEP OR SLEPT IN A SHELTER (INCLUDING NOW)?: PATIENT REFUSED

## 2023-06-08 ASSESSMENT — PATIENT HEALTH QUESTIONNAIRE - PHQ9
2. FEELING DOWN, DEPRESSED OR HOPELESS: 0
1. LITTLE INTEREST OR PLEASURE IN DOING THINGS: 0
SUM OF ALL RESPONSES TO PHQ QUESTIONS 1-9: 0
SUM OF ALL RESPONSES TO PHQ9 QUESTIONS 1 & 2: 0
SUM OF ALL RESPONSES TO PHQ QUESTIONS 1-9: 0

## 2023-06-08 ASSESSMENT — ENCOUNTER SYMPTOMS
SHORTNESS OF BREATH: 1
CONSTIPATION: 0
DIARRHEA: 0
ABDOMINAL PAIN: 0
NAUSEA: 0
RHINORRHEA: 0
COUGH: 0
SORE THROAT: 0
WHEEZING: 0
VOMITING: 0

## 2023-06-08 NOTE — PATIENT INSTRUCTIONS
SURVEY:     You may be receiving a survey from [employer] regarding your visit today. Please complete the survey to enable us to provide the highest quality of care to you and your family. If you cannot score us a very good on any question, please call the office to discuss how we could have made your experience a very good one.      Thank you,    Anderson Segundo, APRN-MARINO Richardson, APRN-CNP  ANNIE Gamble, VINNY Lozada, VINNY Saunders, CMA  Jaycee, OZZIE Alcantar, PM

## 2023-06-08 NOTE — PROGRESS NOTES
Name: Zane Johnson  : 1965         Chief Complaint:     Chief Complaint   Patient presents with    Diabetes     Routine check. Mouth pain, jaw, neck, pain X 2 months    Hypertension       History of Present Illness:      Zane Johnson is a 60 y.o.  male who presents with Diabetes (Routine check. Mouth pain, jaw, neck, pain X 2 months) and Hypertension      Pepe Cook is here today for routine office visit. Unfortunately has not been seen in about 18 months. Patient states he did not get any labs because he could not afford them. He states he is taking his medications but we are unsure of which ones. A1c is well controlled at 6.7. He states he is taking his insulin. His legs are not swollen but his blood pressure is uncontrolled. He is thinking maybe he is missing one of his blood pressure medications. See below for further comments. Diabetes  He presents for his follow-up diabetic visit. He has type 2 diabetes mellitus. Onset time: YEARS. His disease course has been stable. There are no hypoglycemic associated symptoms. Pertinent negatives for hypoglycemia include no dizziness or headaches. There are no diabetic associated symptoms. Pertinent negatives for diabetes include no chest pain, no fatigue, no polydipsia, no polyphagia and no polyuria. There are no hypoglycemic complications. Pertinent negatives for hypoglycemia complications include no hospitalization, no nocturnal hypoglycemia and no required assistance. Symptoms are improving. Diabetic complications include peripheral neuropathy. Pertinent negatives for diabetic complications include no CVA, heart disease or nephropathy. Risk factors for coronary artery disease include diabetes mellitus, dyslipidemia, family history, male sex, obesity, sedentary lifestyle and stress. Current diabetic treatment includes insulin injections and oral agent (monotherapy). He is compliant with treatment all of the time.  His weight is decreasing

## 2023-08-17 DIAGNOSIS — E11.69 TYPE 2 DIABETES MELLITUS WITH OTHER SPECIFIED COMPLICATION, UNSPECIFIED WHETHER LONG TERM INSULIN USE (HCC): ICD-10-CM

## 2023-08-17 DIAGNOSIS — J41.0 SIMPLE CHRONIC BRONCHITIS (HCC): ICD-10-CM

## 2023-08-17 RX ORDER — PEN NEEDLE, DIABETIC 31 G X1/4"
NEEDLE, DISPOSABLE MISCELLANEOUS
Qty: 100 EACH | Refills: 0 | Status: SHIPPED | OUTPATIENT
Start: 2023-08-17

## 2023-08-17 RX ORDER — IPRATROPIUM BROMIDE AND ALBUTEROL SULFATE 2.5; .5 MG/3ML; MG/3ML
SOLUTION RESPIRATORY (INHALATION)
Qty: 360 ML | Refills: 0 | Status: SHIPPED | OUTPATIENT
Start: 2023-08-17

## 2023-08-17 RX ORDER — ALBUTEROL SULFATE 90 UG/1
2 AEROSOL, METERED RESPIRATORY (INHALATION) EVERY 6 HOURS PRN
Qty: 18 G | Refills: 3 | Status: SHIPPED | OUTPATIENT
Start: 2023-08-17

## 2023-08-17 NOTE — TELEPHONE ENCOUNTER
Insurance is indicating he needs Ventolin HFA instead of albuterol. Last seen 11/22, no future appt. Noted.

## 2023-11-13 RX ORDER — FLUTICASONE FUROATE AND VILANTEROL TRIFENATATE 200; 25 UG/1; UG/1
1 POWDER RESPIRATORY (INHALATION) DAILY
Qty: 1 EACH | Refills: 0 | Status: SHIPPED | OUTPATIENT
Start: 2023-11-13

## 2023-11-15 NOTE — TELEPHONE ENCOUNTER
Called and left [facility] regarding 0 refills on his script and he would need to make an appt.

## 2023-11-22 DIAGNOSIS — E78.5 DYSLIPIDEMIA: ICD-10-CM

## 2023-11-22 RX ORDER — ATORVASTATIN CALCIUM 40 MG/1
TABLET, FILM COATED ORAL
Qty: 90 TABLET | Refills: 3 | Status: SHIPPED | OUTPATIENT
Start: 2023-11-22

## 2023-11-22 NOTE — TELEPHONE ENCOUNTER
Health Maintenance   Topic Date Due    Diabetic Alb to Cr ratio (uACR) test  05/11/2022    Lipids  10/05/2022    GFR test (Diabetes, CKD 3-4, OR last GFR 15-59)  10/05/2022    Annual Wellness Visit (AWV)  Never done    Flu vaccine (1) 08/01/2023    COVID-19 Vaccine (4 - 2023-24 season) 09/01/2023    Hepatitis B vaccine (1 of 3 - 3-dose series) 06/07/2024 (Originally 1965)    HIV screen  06/07/2024 (Originally 4/24/1979)    Diabetic foot exam  06/08/2024 (Originally 4/24/1974)    Shingles vaccine (1 of 2) 06/08/2024 (Originally 4/24/2014)    Pneumococcal 0-64 years Vaccine (1 - PCV) 06/08/2024 (Originally 4/24/1970)    Diabetic retinal exam  06/17/2024 (Originally 4/24/1982)    A1C test (Diabetic or Prediabetic)  06/08/2024    Depression Screen  06/15/2024    DTaP/Tdap/Td vaccine (2 - Td or Tdap) 06/09/2031    Colorectal Cancer Screen  05/13/2032    Hepatitis C screen  Completed    Hepatitis A vaccine  Aged Out    Hib vaccine  Aged Out    Meningococcal (ACWY) vaccine  Aged Out    Depression Monitoring  Discontinued    Low dose CT lung screening &/or counseling  Discontinued             (applicable per patient's age: Cancer Screenings, Depression Screening, Fall Risk Screening, Immunizations)    Hemoglobin A1C (%)   Date Value   06/08/2023 6.7   01/27/2022 6.6   10/05/2021 6.7 (H)     LDL Cholesterol (mg/dL)   Date Value   10/05/2021 19     AST (U/L)   Date Value   05/10/2021 35     ALT (U/L)   Date Value   05/10/2021 24     BUN (mg/dL)   Date Value   10/05/2021 28 (H)      (goal A1C is < 7)   (goal LDL is <100) need 30-50% reduction from baseline     BP Readings from Last 3 Encounters:   06/15/23 136/84   06/08/23 (!) 168/94   11/16/22 (!) 147/85    (goal /80)      All Future Testing planned in [facility]:  Lab Frequency Next Occurrence   PSA Screening Once 06/08/2023   CBC with Auto Differential Once 06/08/2023   ALT Once 06/08/2023   AST Once 11/81/4047   Basic Metabolic Panel Once 52/86/0546   Lipid Panel

## 2023-12-08 ENCOUNTER — TELEPHONE (OUTPATIENT)
Dept: PRIMARY CARE CLINIC | Age: 58
End: 2023-12-08

## 2023-12-13 RX ORDER — FLUTICASONE FUROATE AND VILANTEROL TRIFENATATE 200; 25 UG/1; UG/1
1 POWDER RESPIRATORY (INHALATION) DAILY
Qty: 60 EACH | Refills: 0 | OUTPATIENT
Start: 2023-12-13

## 2023-12-15 DIAGNOSIS — Z79.4 TYPE 2 DIABETES MELLITUS WITH OTHER SPECIFIED COMPLICATION, WITH LONG-TERM CURRENT USE OF INSULIN (HCC): ICD-10-CM

## 2023-12-15 DIAGNOSIS — I10 ESSENTIAL HYPERTENSION: ICD-10-CM

## 2023-12-15 DIAGNOSIS — E11.69 TYPE 2 DIABETES MELLITUS WITH OTHER SPECIFIED COMPLICATION, WITH LONG-TERM CURRENT USE OF INSULIN (HCC): ICD-10-CM

## 2023-12-15 RX ORDER — SITAGLIPTIN 100 MG/1
100 TABLET, FILM COATED ORAL DAILY
Qty: 90 TABLET | Refills: 1 | Status: SHIPPED | OUTPATIENT
Start: 2023-12-15

## 2023-12-15 RX ORDER — CARVEDILOL 3.12 MG/1
3.12 TABLET ORAL 2 TIMES DAILY
Qty: 180 TABLET | Refills: 1 | Status: SHIPPED | OUTPATIENT
Start: 2023-12-15

## 2023-12-15 RX ORDER — BUPROPION HYDROCHLORIDE 150 MG/1
150 TABLET ORAL EVERY MORNING
Qty: 90 TABLET | Refills: 1 | Status: SHIPPED | OUTPATIENT
Start: 2023-12-15

## 2023-12-15 NOTE — TELEPHONE ENCOUNTER
Once 06/08/2023   Microalbumin, Ur Once 06/08/2023       Next Visit Date:  Future Appointments   Date Time Provider [address]   1/3/2024 11:40 AM Jasmine Segundo APRN - CNP [facility]            Patient Active Problem List:     Liver disease     Chronic obstructive pulmonary disease, unspecified COPD type ([address])     Diabetes mellitus ([address])     Essential hypertension     Smoker     Morbid obesity with BMI of 40.0-44.9, adult ([address])

## 2023-12-20 DIAGNOSIS — J41.0 SIMPLE CHRONIC BRONCHITIS (HCC): ICD-10-CM

## 2023-12-21 RX ORDER — IPRATROPIUM BROMIDE AND ALBUTEROL SULFATE 2.5; .5 MG/3ML; MG/3ML
SOLUTION RESPIRATORY (INHALATION)
Qty: 360 ML | Refills: 0 | OUTPATIENT
Start: 2023-12-21

## 2023-12-21 RX ORDER — FLUTICASONE FUROATE AND VILANTEROL TRIFENATATE 200; 25 UG/1; UG/1
1 POWDER RESPIRATORY (INHALATION) DAILY
Qty: 60 EACH | Refills: 0 | OUTPATIENT
Start: 2023-12-21

## 2023-12-27 ENCOUNTER — TELEPHONE (OUTPATIENT)
Dept: PRIMARY CARE CLINIC | Age: 58
End: 2023-12-27

## 2024-01-10 DIAGNOSIS — E11.69 TYPE 2 DIABETES MELLITUS WITH OTHER SPECIFIED COMPLICATION, UNSPECIFIED WHETHER LONG TERM INSULIN USE (HCC): ICD-10-CM

## 2024-01-10 DIAGNOSIS — J41.0 SIMPLE CHRONIC BRONCHITIS (HCC): ICD-10-CM

## 2024-01-10 RX ORDER — IPRATROPIUM BROMIDE AND ALBUTEROL SULFATE 2.5; .5 MG/3ML; MG/3ML
SOLUTION RESPIRATORY (INHALATION)
Qty: 360 ML | Refills: 0 | OUTPATIENT
Start: 2024-01-10

## 2024-01-10 RX ORDER — PEN NEEDLE, DIABETIC 31 G X1/4"
NEEDLE, DISPOSABLE MISCELLANEOUS
Qty: 100 EACH | Refills: 0 | Status: SHIPPED | OUTPATIENT
Start: 2024-01-10

## 2024-01-10 RX ORDER — FLUTICASONE FUROATE AND VILANTEROL TRIFENATATE 200; 25 UG/1; UG/1
1 POWDER RESPIRATORY (INHALATION) DAILY
Qty: 60 EACH | Refills: 0 | OUTPATIENT
Start: 2024-01-10

## 2024-01-10 RX ORDER — INSULIN DEGLUDEC 200 U/ML
INJECTION, SOLUTION SUBCUTANEOUS
Qty: 9 ML | Refills: 0 | Status: SHIPPED | OUTPATIENT
Start: 2024-01-10

## 2024-01-10 NOTE — TELEPHONE ENCOUNTER
Health Maintenance   Topic Date Due    Diabetic Alb to Cr ratio (uACR) test  05/11/2022    Lipids  10/05/2022    GFR test (Diabetes, CKD 3-4, OR last GFR 15-59)  10/05/2022    Flu vaccine (1) 08/01/2023    COVID-19 Vaccine (4 - 2023-24 season) 09/01/2023    Annual Wellness Visit (Medicare)  Never done    Hepatitis B vaccine (1 of 3 - 3-dose series) 06/07/2024 (Originally 1965)    HIV screen  06/07/2024 (Originally 4/24/1979)    Diabetic foot exam  06/08/2024 (Originally 4/24/1974)    Shingles vaccine (1 of 2) 06/08/2024 (Originally 4/24/2014)    Pneumococcal 0-64 years Vaccine (1 - PCV) 06/08/2024 (Originally 4/24/1970)    Diabetic retinal exam  06/17/2024 (Originally 4/24/1982)    A1C test (Diabetic or Prediabetic)  06/08/2024    Depression Screen  06/15/2024    DTaP/Tdap/Td vaccine (2 - Td or Tdap) 06/09/2031    Colorectal Cancer Screen  05/13/2032    Hepatitis C screen  Completed    Hepatitis A vaccine  Aged Out    Hib vaccine  Aged Out    Polio vaccine  Aged Out    Meningococcal (ACWY) vaccine  Aged Out    Depression Monitoring  Discontinued    Low dose CT lung screening &/or counseling  Discontinued             (applicable per patient's age: Cancer Screenings, Depression Screening, Fall Risk Screening, Immunizations)    Hemoglobin A1C (%)   Date Value   06/08/2023 6.7   01/27/2022 6.6   10/05/2021 6.7 (H)     LDL Cholesterol (mg/dL)   Date Value   10/05/2021 19     AST (U/L)   Date Value   05/10/2021 35     ALT (U/L)   Date Value   05/10/2021 24     BUN (mg/dL)   Date Value   10/05/2021 28 (H)      (goal A1C is < 7)   (goal LDL is <100) need 30-50% reduction from baseline     BP Readings from Last 3 Encounters:   06/15/23 136/84   06/08/23 (!) 168/94   11/16/22 (!) 147/85    (goal /80)      All Future Testing planned in [facility]:  Lab Frequency Next Occurrence   PSA Screening Once 06/08/2023   CBC with Auto Differential Once 06/08/2023   ALT Once 06/08/2023   AST Once 06/08/2023   Basic Metabolic

## 2024-01-19 ENCOUNTER — TELEPHONE (OUTPATIENT)
Dept: PRIMARY CARE CLINIC | Age: 59
End: 2024-01-19

## 2024-03-15 DIAGNOSIS — E11.69 TYPE 2 DIABETES MELLITUS WITH OTHER SPECIFIED COMPLICATION, WITH LONG-TERM CURRENT USE OF INSULIN (HCC): ICD-10-CM

## 2024-03-15 DIAGNOSIS — Z79.4 TYPE 2 DIABETES MELLITUS WITH OTHER SPECIFIED COMPLICATION, WITH LONG-TERM CURRENT USE OF INSULIN (HCC): ICD-10-CM

## 2024-03-15 RX ORDER — METFORMIN HYDROCHLORIDE 500 MG/1
TABLET, EXTENDED RELEASE ORAL
Qty: 180 TABLET | Refills: 0 | OUTPATIENT
Start: 2024-03-15

## 2024-03-15 RX ORDER — INSULIN DEGLUDEC 200 U/ML
INJECTION, SOLUTION SUBCUTANEOUS
Qty: 9 ML | Refills: 0 | OUTPATIENT
Start: 2024-03-15

## 2024-03-26 DIAGNOSIS — E11.69 TYPE 2 DIABETES MELLITUS WITH OTHER SPECIFIED COMPLICATION, UNSPECIFIED WHETHER LONG TERM INSULIN USE (HCC): ICD-10-CM

## 2024-03-26 RX ORDER — PEN NEEDLE, DIABETIC 31 G X1/4"
NEEDLE, DISPOSABLE MISCELLANEOUS
Qty: 100 EACH | Refills: 0 | Status: SHIPPED | OUTPATIENT
Start: 2024-03-26

## 2024-03-26 RX ORDER — INSULIN DEGLUDEC 200 U/ML
INJECTION, SOLUTION SUBCUTANEOUS
Qty: 9 ML | Refills: 0 | Status: SHIPPED | OUTPATIENT
Start: 2024-03-26

## 2024-03-26 NOTE — TELEPHONE ENCOUNTER
Health Maintenance   Topic Date Due    Diabetic Alb to Cr ratio (uACR) test  05/11/2022    Lipids  10/05/2022    GFR test (Diabetes, CKD 3-4, OR last GFR 15-59)  10/05/2022    Flu vaccine (1) 08/01/2023    COVID-19 Vaccine (4 - 2023-24 season) 09/01/2023    Annual Wellness Visit (Medicare)  Never done    Hepatitis B vaccine (1 of 3 - 3-dose series) 06/07/2024 (Originally 1965)    HIV screen  06/07/2024 (Originally 4/24/1979)    Diabetic foot exam  06/08/2024 (Originally 4/24/1974)    Shingles vaccine (1 of 2) 06/08/2024 (Originally 4/24/2014)    Pneumococcal 0-64 years Vaccine (1 of 2 - PCV) 06/08/2024 (Originally 4/24/1970)    Diabetic retinal exam  06/17/2024 (Originally 4/24/1982)    A1C test (Diabetic or Prediabetic)  06/08/2024    Depression Screen  06/15/2024    DTaP/Tdap/Td vaccine (2 - Td or Tdap) 06/09/2031    Colorectal Cancer Screen  05/13/2032    Hepatitis C screen  Completed    Hepatitis A vaccine  Aged Out    Hib vaccine  Aged Out    Polio vaccine  Aged Out    Meningococcal (ACWY) vaccine  Aged Out    Depression Monitoring  Discontinued    Low dose CT lung screening &/or counseling  Discontinued             (applicable per patient's age: Cancer Screenings, Depression Screening, Fall Risk Screening, Immunizations)    Hemoglobin A1C (%)   Date Value   06/08/2023 6.7   01/27/2022 6.6   10/05/2021 6.7 (H)     LDL Cholesterol (mg/dL)   Date Value   10/05/2021 19     AST (U/L)   Date Value   05/10/2021 35     ALT (U/L)   Date Value   05/10/2021 24     BUN (mg/dL)   Date Value   10/05/2021 28 (H)      (goal A1C is < 7)   (goal LDL is <100) need 30-50% reduction from baseline     BP Readings from Last 3 Encounters:   06/15/23 136/84   06/08/23 (!) 168/94   11/16/22 (!) 147/85    (goal /80)      All Future Testing planned in [facility]:  Lab Frequency Next Occurrence   PSA Screening Once 06/08/2023   CBC with Auto Differential Once 06/08/2023   ALT Once 06/08/2023   AST Once 06/08/2023   Basic

## 2024-05-10 ENCOUNTER — TELEPHONE (OUTPATIENT)
Dept: PRIMARY CARE CLINIC | Age: 59
End: 2024-05-10

## 2025-05-21 ENCOUNTER — OFFICE VISIT (OUTPATIENT)
Dept: PRIMARY CARE CLINIC | Age: 60
End: 2025-05-21
Payer: MEDICARE

## 2025-05-21 VITALS
DIASTOLIC BLOOD PRESSURE: 90 MMHG | OXYGEN SATURATION: 95 % | HEART RATE: 75 BPM | SYSTOLIC BLOOD PRESSURE: 168 MMHG | RESPIRATION RATE: 16 BRPM | TEMPERATURE: 98.7 F | BODY MASS INDEX: 38.44 KG/M2 | HEIGHT: 71 IN | WEIGHT: 274.6 LBS

## 2025-05-21 DIAGNOSIS — E11.649 UNCONTROLLED TYPE 2 DIABETES MELLITUS WITH HYPOGLYCEMIA WITHOUT COMA (HCC): ICD-10-CM

## 2025-05-21 DIAGNOSIS — I10 ESSENTIAL HYPERTENSION: ICD-10-CM

## 2025-05-21 DIAGNOSIS — Z00.00 INITIAL MEDICARE ANNUAL WELLNESS VISIT: Primary | ICD-10-CM

## 2025-05-21 DIAGNOSIS — Z12.5 SCREENING PSA (PROSTATE SPECIFIC ANTIGEN): ICD-10-CM

## 2025-05-21 LAB — HBA1C MFR BLD: 14 %

## 2025-05-21 PROCEDURE — 83036 HEMOGLOBIN GLYCOSYLATED A1C: CPT | Performed by: NURSE PRACTITIONER

## 2025-05-21 PROCEDURE — 3017F COLORECTAL CA SCREEN DOC REV: CPT | Performed by: NURSE PRACTITIONER

## 2025-05-21 PROCEDURE — 3077F SYST BP >= 140 MM HG: CPT | Performed by: NURSE PRACTITIONER

## 2025-05-21 PROCEDURE — 3080F DIAST BP >= 90 MM HG: CPT | Performed by: NURSE PRACTITIONER

## 2025-05-21 PROCEDURE — 3046F HEMOGLOBIN A1C LEVEL >9.0%: CPT | Performed by: NURSE PRACTITIONER

## 2025-05-21 PROCEDURE — G0438 PPPS, INITIAL VISIT: HCPCS | Performed by: NURSE PRACTITIONER

## 2025-05-21 RX ORDER — CARVEDILOL 3.12 MG/1
3.12 TABLET ORAL 2 TIMES DAILY
Qty: 180 TABLET | Refills: 1 | Status: SHIPPED | OUTPATIENT
Start: 2025-05-21

## 2025-05-21 RX ORDER — METFORMIN HYDROCHLORIDE 500 MG/1
TABLET, EXTENDED RELEASE ORAL
Qty: 180 TABLET | Refills: 1 | Status: SHIPPED | OUTPATIENT
Start: 2025-05-21

## 2025-05-21 RX ORDER — IBUPROFEN 200 MG
200 TABLET ORAL EVERY 6 HOURS PRN
COMMUNITY

## 2025-05-21 RX ORDER — LOSARTAN POTASSIUM 50 MG/1
50 TABLET ORAL DAILY
Qty: 90 TABLET | Refills: 1 | Status: SHIPPED | OUTPATIENT
Start: 2025-05-21

## 2025-05-21 RX ORDER — INSULIN GLARGINE 100 [IU]/ML
20 INJECTION, SOLUTION SUBCUTANEOUS NIGHTLY
Qty: 5 ADJUSTABLE DOSE PRE-FILLED PEN SYRINGE | Refills: 0 | Status: SHIPPED | OUTPATIENT
Start: 2025-05-21

## 2025-05-21 SDOH — ECONOMIC STABILITY: FOOD INSECURITY: WITHIN THE PAST 12 MONTHS, THE FOOD YOU BOUGHT JUST DIDN'T LAST AND YOU DIDN'T HAVE MONEY TO GET MORE.: NEVER TRUE

## 2025-05-21 SDOH — ECONOMIC STABILITY: FOOD INSECURITY: WITHIN THE PAST 12 MONTHS, YOU WORRIED THAT YOUR FOOD WOULD RUN OUT BEFORE YOU GOT MONEY TO BUY MORE.: NEVER TRUE

## 2025-05-21 ASSESSMENT — PATIENT HEALTH QUESTIONNAIRE - PHQ9
1. LITTLE INTEREST OR PLEASURE IN DOING THINGS: NOT AT ALL
SUM OF ALL RESPONSES TO PHQ QUESTIONS 1-9: 0
SUM OF ALL RESPONSES TO PHQ QUESTIONS 1-9: 0
2. FEELING DOWN, DEPRESSED OR HOPELESS: NOT AT ALL
SUM OF ALL RESPONSES TO PHQ QUESTIONS 1-9: 0
SUM OF ALL RESPONSES TO PHQ QUESTIONS 1-9: 0

## 2025-05-21 ASSESSMENT — ENCOUNTER SYMPTOMS
BLURRED VISION: 0
VISUAL CHANGE: 0
ORTHOPNEA: 0
SHORTNESS OF BREATH: 0

## 2025-05-21 ASSESSMENT — VISUAL ACUITY
OD_CC: 20/30
OS_CC: 20/30

## 2025-05-21 ASSESSMENT — LIFESTYLE VARIABLES
HOW MANY STANDARD DRINKS CONTAINING ALCOHOL DO YOU HAVE ON A TYPICAL DAY: PATIENT DOES NOT DRINK
HOW OFTEN DO YOU HAVE A DRINK CONTAINING ALCOHOL: NEVER

## 2025-05-21 NOTE — PROGRESS NOTES
Medicare Annual Wellness Visit    Satish Smith is here for Medicare AWV and Diabetes (Check up. )    Assessment & Plan   Initial Medicare annual wellness visit  Uncontrolled type 2 diabetes mellitus with hypoglycemia without coma (HCC)  -     POCT glycosylated hemoglobin (Hb A1C)  -     CBC with Auto Differential; Future  -     ALT; Future  -     AST; Future  -     Basic Metabolic Panel; Future  -     Lipid Panel; Future  -     Albumin/Creatinine Ratio, Urine; Future  -     insulin glargine (BASAGLAR KWIKPEN) 100 UNIT/ML injection pen; Inject 20 Units into the skin nightly, Disp-5 Adjustable Dose Pre-filled Pen Syringe, R-0Normal  -     metFORMIN (GLUCOPHAGE-XR) 500 MG extended release tablet; TAKE 2 TABLETS BY MOUTH ONCE DAILY WITH BREAKFAST, Disp-180 tablet, R-1Normal  -     SITagliptin (JANUVIA) 100 MG tablet; Take 1 tablet by mouth daily, Disp-90 tablet, R-1Normal  Essential hypertension  -     carvedilol (COREG) 3.125 MG tablet; Take 1 tablet by mouth 2 times daily, Disp-180 tablet, R-1Normal  -     losartan (COZAAR) 50 MG tablet; Take 1 tablet by mouth daily, Disp-90 tablet, R-1Normal  Screening PSA (prostate specific antigen)  -     PSA Screening; Future       Return in 3 months (on 8/21/2025).     Subjective   The following acute and/or chronic problems were also addressed today:  Satish is here today for a Medicare annual wellness visit and routine office visit.    Has not been seen in approximately 2 years.  Is taking no medications currently.      Diabetes  He presents for his follow-up diabetic visit. He has type 2 diabetes mellitus. No MedicAlert identification noted. Onset time: YEARS. His disease course has been worsening. There are no hypoglycemic associated symptoms. Pertinent negatives for hypoglycemia include no dizziness, headaches, nervousness/anxiousness, pallor, speech difficulty or sweats. Associated symptoms include foot paresthesias. Pertinent negatives for diabetes include no blurred

## 2025-05-21 NOTE — PATIENT INSTRUCTIONS
SURVEY:     You may be receiving a survey from [employer] regarding your visit today.     Please complete the survey to enable us to provide the highest quality of care to you and your family.     If you cannot score us a very good on any question, please call the office to discuss how we could have made your experience a very good one.     Thank you,    Guerrero Segundo, APRN-CNP  Kimberli Lozano, APRN-CNP  Radha, LPN  Amairani, CMA  Jak, CMA  Nicky, CMA  Jaycee, PCA  Nahed, CMA  Katharine, PM       Preventing Falls: Care Instructions  Injuries and health problems such as trouble walking or poor eyesight can increase your risk of falling. So can some medicines. But there are things you can do to help prevent falls. You can exercise to get stronger. You can also arrange your home to make it safer.    Talk to your doctor about the medicines you take. Ask if any of them increase the risk of falls and whether they can be changed or stopped.   Try to exercise regularly. It can help improve your strength and balance. This can help lower your risk of falling.         Practice fall safety and prevention.   Wear low-heeled shoes that fit well and give your feet good support. Talk to your doctor if you have foot problems that make this hard.  Carry a cellphone or wear a medical alert device that you can use to call for help.  Use stepladders instead of chairs to reach high objects. Don't climb if you're at risk for falls. Ask for help, if needed.  Wear the correct eyeglasses, if you need them.        Make your home safer.   Remove rugs, cords, clutter, and furniture from walkways.  Keep your house well lit. Use night-lights in hallways and bathrooms.  Install and use sturdy handrails on stairways.  Wear nonskid footwear, even inside. Don't walk barefoot or in socks without shoes.        Be safe outside.   Use handrails, curb cuts, and ramps whenever possible.  Keep your hands free by using a shoulder bag or backpack.  Try to walk

## 2025-05-22 ENCOUNTER — TELEPHONE (OUTPATIENT)
Dept: PRIMARY CARE CLINIC | Age: 60
End: 2025-05-22

## 2025-05-22 DIAGNOSIS — E78.5 DYSLIPIDEMIA: ICD-10-CM

## 2025-05-22 RX ORDER — GLIPIZIDE 5 MG/1
5 TABLET, FILM COATED, EXTENDED RELEASE ORAL DAILY
Qty: 90 TABLET | Refills: 1 | Status: SHIPPED | OUTPATIENT
Start: 2025-05-22

## 2025-05-22 RX ORDER — ATORVASTATIN CALCIUM 40 MG/1
40 TABLET, FILM COATED ORAL DAILY
Qty: 90 TABLET | Refills: 1 | Status: SHIPPED | OUTPATIENT
Start: 2025-05-22

## 2025-05-22 NOTE — TELEPHONE ENCOUNTER
Patient contacted the office stating that he is unable to afford the januvia, questioning if a different medication could be sent for him.      Please advise.

## 2025-06-11 ENCOUNTER — CLINICAL SUPPORT (OUTPATIENT)
Dept: PRIMARY CARE CLINIC | Age: 60
End: 2025-06-11

## 2025-06-11 ENCOUNTER — TELEPHONE (OUTPATIENT)
Dept: PRIMARY CARE CLINIC | Age: 60
End: 2025-06-11

## 2025-06-11 VITALS
OXYGEN SATURATION: 84 % | DIASTOLIC BLOOD PRESSURE: 90 MMHG | WEIGHT: 276.3 LBS | TEMPERATURE: 98.6 F | HEART RATE: 65 BPM | BODY MASS INDEX: 38.54 KG/M2 | SYSTOLIC BLOOD PRESSURE: 162 MMHG

## 2025-06-11 DIAGNOSIS — I10 ESSENTIAL HYPERTENSION: ICD-10-CM

## 2025-06-11 RX ORDER — LOSARTAN POTASSIUM 100 MG/1
100 TABLET ORAL DAILY
Qty: 90 TABLET | Refills: 3 | Status: SHIPPED | OUTPATIENT
Start: 2025-06-11

## 2025-06-11 NOTE — PROGRESS NOTES
Increase losartan to 100 mg daily.  Continue all other medications.  Recheck blood pressure in 2 weeks.  Thank you.

## 2025-06-11 NOTE — TELEPHONE ENCOUNTER
With his oxygen levels being so low.  He needs to see pulmonology before we can give him anything sedating like that.  Please help him get an appointment.  Thank you.

## 2025-06-11 NOTE — TELEPHONE ENCOUNTER
Patient is requesting a prescription for Ambien extended release, patient states he has been on it before and would like to restart the medication.    Please advise.

## 2025-06-11 NOTE — TELEPHONE ENCOUNTER
Patient notified and stated, \"well I will not be getting any ambien because I cannot afford the other doctors\"..

## 2025-06-30 ENCOUNTER — TELEPHONE (OUTPATIENT)
Dept: PRIMARY CARE CLINIC | Age: 60
End: 2025-06-30

## 2025-06-30 NOTE — TELEPHONE ENCOUNTER
Patient contacted the office and stated he has missed his last 2 blood pressure checks because he is having vehicle issues. He called to update you on the medication and his bp readings at home. He stated that he takes is carvedilol 2 times a day, he also was taking losartan  1 time daily and then you stated he was able to take 2 losartan daily. When he takes both losartan pills at the same time it makes him feel lightheaded and weird. He is still getting mildly high blood pressure readings at home with systolic staying around 140-159, and diastolic running around 70-90. He wanted to know if you would adjust his medications or recommendations on what he should do?    Please advise thank you

## 2025-06-30 NOTE — TELEPHONE ENCOUNTER
If he still has the losartan 50 mg tablets have him take those twice daily with the carvedilol.  Continue to monitor readings and let us know at the end of the week.  Thank you.

## 2025-08-14 ENCOUNTER — TELEPHONE (OUTPATIENT)
Dept: PRIMARY CARE CLINIC | Age: 60
End: 2025-08-14

## (undated) DEVICE — MEDI-VAC NON-CONDUCTIVE TUBING7MM X 30.5 (100FT): Brand: CARDINAL HEALTH

## (undated) DEVICE — SOLUTION IV IRRIG POUR BRL 0.9% SODIUM CHL 2F7124

## (undated) DEVICE — CANNULA ORAL NSL AD CO2 N INTUB O2 DEL DISP TRU LNK